# Patient Record
Sex: MALE | Race: BLACK OR AFRICAN AMERICAN | NOT HISPANIC OR LATINO | Employment: UNEMPLOYED | URBAN - METROPOLITAN AREA
[De-identification: names, ages, dates, MRNs, and addresses within clinical notes are randomized per-mention and may not be internally consistent; named-entity substitution may affect disease eponyms.]

---

## 2016-09-13 LAB — HBA1C MFR BLD HPLC: 5.7 %

## 2017-08-07 ENCOUNTER — ALLSCRIPTS OFFICE VISIT (OUTPATIENT)
Dept: OTHER | Facility: OTHER | Age: 42
End: 2017-08-07

## 2017-08-07 ENCOUNTER — GENERIC CONVERSION - ENCOUNTER (OUTPATIENT)
Dept: OTHER | Facility: OTHER | Age: 42
End: 2017-08-07

## 2017-11-25 ENCOUNTER — HOSPITAL ENCOUNTER (EMERGENCY)
Facility: HOSPITAL | Age: 42
Discharge: HOME | End: 2017-11-26
Attending: EMERGENCY MEDICINE | Admitting: EMERGENCY MEDICINE
Payer: COMMERCIAL

## 2017-11-25 DIAGNOSIS — R73.9 HYPERGLYCEMIA: ICD-10-CM

## 2017-11-25 DIAGNOSIS — E11.9 DIABETES (HCC): Primary | ICD-10-CM

## 2017-11-25 LAB — GLUCOSE SERPL-MCNC: 343 MG/DL (ref 65–140)

## 2017-11-25 PROCEDURE — 82948 REAGENT STRIP/BLOOD GLUCOSE: CPT

## 2017-11-25 PROCEDURE — 96360 HYDRATION IV INFUSION INIT: CPT

## 2017-11-25 RX ORDER — LISINOPRIL 40 MG/1
40 TABLET ORAL
COMMUNITY
End: 2018-12-11 | Stop reason: SDUPTHER

## 2017-11-25 RX ADMIN — METFORMIN HYDROCHLORIDE 500 MG: 500 TABLET, FILM COATED ORAL at 23:56

## 2017-11-25 RX ADMIN — SODIUM CHLORIDE 1000 ML: 0.9 INJECTION, SOLUTION INTRAVENOUS at 23:57

## 2017-11-26 VITALS
RESPIRATION RATE: 20 BRPM | DIASTOLIC BLOOD PRESSURE: 78 MMHG | WEIGHT: 262 LBS | BODY MASS INDEX: 36.68 KG/M2 | HEART RATE: 98 BPM | SYSTOLIC BLOOD PRESSURE: 140 MMHG | TEMPERATURE: 97.8 F | OXYGEN SATURATION: 98 % | HEIGHT: 71 IN

## 2017-11-26 LAB
ANION GAP SERPL CALCULATED.3IONS-SCNC: 16 MMOL/L (ref 4–13)
BASOPHILS # BLD AUTO: 0.05 THOUSANDS/ΜL (ref 0–0.1)
BASOPHILS NFR BLD AUTO: 1 % (ref 0–1)
BUN SERPL-MCNC: 21 MG/DL (ref 5–25)
CALCIUM SERPL-MCNC: 9.3 MG/DL (ref 8.3–10.1)
CHLORIDE SERPL-SCNC: 93 MMOL/L (ref 100–108)
CO2 SERPL-SCNC: 21 MMOL/L (ref 21–32)
CREAT SERPL-MCNC: 1.42 MG/DL (ref 0.6–1.3)
EOSINOPHIL # BLD AUTO: 0.22 THOUSAND/ΜL (ref 0–0.61)
EOSINOPHIL NFR BLD AUTO: 2 % (ref 0–6)
ERYTHROCYTE [DISTWIDTH] IN BLOOD BY AUTOMATED COUNT: 12 % (ref 11.6–15.1)
GFR SERPL CREATININE-BSD FRML MDRD: 71 ML/MIN/1.73SQ M
GLUCOSE SERPL-MCNC: 341 MG/DL (ref 65–140)
GLUCOSE SERPL-MCNC: 389 MG/DL (ref 65–140)
HCT VFR BLD AUTO: 44.5 % (ref 36.5–49.3)
HGB BLD-MCNC: 15.1 G/DL (ref 12–17)
LYMPHOCYTES # BLD AUTO: 3.21 THOUSANDS/ΜL (ref 0.6–4.47)
LYMPHOCYTES NFR BLD AUTO: 30 % (ref 14–44)
MCH RBC QN AUTO: 27.3 PG (ref 26.8–34.3)
MCHC RBC AUTO-ENTMCNC: 33.9 G/DL (ref 31.4–37.4)
MCV RBC AUTO: 81 FL (ref 82–98)
MONOCYTES # BLD AUTO: 0.66 THOUSAND/ΜL (ref 0.17–1.22)
MONOCYTES NFR BLD AUTO: 6 % (ref 4–12)
NEUTROPHILS # BLD AUTO: 6.58 THOUSANDS/ΜL (ref 1.85–7.62)
NEUTS SEG NFR BLD AUTO: 61 % (ref 43–75)
NRBC BLD AUTO-RTO: 0 /100 WBCS
PLATELET # BLD AUTO: 155 THOUSANDS/UL (ref 149–390)
PMV BLD AUTO: 12.4 FL (ref 8.9–12.7)
POTASSIUM SERPL-SCNC: 3.7 MMOL/L (ref 3.5–5.3)
RBC # BLD AUTO: 5.53 MILLION/UL (ref 3.88–5.62)
SODIUM SERPL-SCNC: 130 MMOL/L (ref 136–145)
WBC # BLD AUTO: 10.75 THOUSAND/UL (ref 4.31–10.16)

## 2017-11-26 PROCEDURE — 80048 BASIC METABOLIC PNL TOTAL CA: CPT | Performed by: EMERGENCY MEDICINE

## 2017-11-26 PROCEDURE — 99283 EMERGENCY DEPT VISIT LOW MDM: CPT

## 2017-11-26 PROCEDURE — 96361 HYDRATE IV INFUSION ADD-ON: CPT

## 2017-11-26 PROCEDURE — 82948 REAGENT STRIP/BLOOD GLUCOSE: CPT

## 2017-11-26 PROCEDURE — 36415 COLL VENOUS BLD VENIPUNCTURE: CPT | Performed by: EMERGENCY MEDICINE

## 2017-11-26 PROCEDURE — 85025 COMPLETE CBC W/AUTO DIFF WBC: CPT | Performed by: EMERGENCY MEDICINE

## 2017-11-26 RX ORDER — LANCETS 28 GAUGE
EACH MISCELLANEOUS
Qty: 100 EACH | Refills: 0 | Status: SHIPPED | OUTPATIENT
Start: 2017-11-26

## 2017-11-26 NOTE — DISCHARGE INSTRUCTIONS
Please return if he developed worsening or other concerning symptoms otherwise start this medication check her blood sugars as instructed follow up with family doctor for counseling and further instructions as instructed as well as adjustments of 2 medication         10% - bad control"> 10% - bad control,Hemoglobin A1c (HbA1c) greater than 10% indicating poor diabetic control,Haemoglobin A1c greater than 10% indicating poor diabetic control">   Diabetes Mellitus Type 2 in Adults, Ambulatory Care   GENERAL INFORMATION:   Diabetes mellitus type 2  is a disease that affects how your body uses glucose (sugar)  Insulin helps move sugar out of the blood so it can be used for energy  Normally, when the blood sugar level increases, the pancreas makes more insulin  Type 2 diabetes develops because either the body cannot make enough insulin, or it cannot use the insulin correctly  After many years, your pancreas may stop making insulin  Common symptoms include the following:   · More hunger or thirst than usual     · Frequent urination     · Weight loss without trying     · Blurred vision  Seek immediate care for the following symptoms:   · Severe abdominal pain, or pain that spreads to your back  You may also be vomiting  · Trouble staying awake or focusing    · Shaking or sweating    · Blurred or double vision    · Breath has a fruity, sweet smell    · Breathing is deep and labored, or rapid and shallow    · Heartbeat is fast and weak  Treatment for diabetes mellitus type 2  includes keeping your blood sugar at a normal level  You must eat the right foods, and exercise regularly  You may also need medicine if you cannot control your blood sugar level with nutrition and exercise  Manage diabetes mellitus type 2:   · Check your blood sugar level  You will be taught how to check a small drop of blood in a glucose monitor  Ask your healthcare provider when and how often to check during the day   Ask your healthcare provider what your blood sugar levels should be when you check them  · Keep track of carbohydrates (sugar and starchy foods)  Your blood sugar level can get too high if you eat too many carbohydrates  Your dietitian will help you plan meals and snacks that have the right amount of carbohydrates  · Eat low-fat foods  Some examples are skinless chicken and low-fat milk  · Eat less sodium (salt)  Some examples of high-sodium foods to limit are soy sauce, potato chips, and soup  Do not add salt to food you cook  Limit your use of table salt  · Eat high-fiber foods  Foods that are a good source of fiber include vegetables, whole grain bread, and beans  · Limit alcohol  Alcohol affects your blood sugar level and can make it harder to manage your diabetes  Women should limit alcohol to 1 drink a day  Men should limit alcohol to 2 drinks a day  A drink of alcohol is 12 ounces of beer, 5 ounces of wine, or 1½ ounces of liquor  · Get regular exercise  Exercise can help keep your blood sugar level steady, decrease your risk of heart disease, and help you lose weight  Exercise for at least 30 minutes, 5 days a week  Include muscle strengthening activities 2 days each week  Work with your healthcare provider to create an exercise plan  · Check your feet each day  for injuries or open sores  Ask your healthcare provider for activities you can do if you have an open sore  · Quit smoking  If you smoke, it is never too late to quit  Smoking can worsen the problems that may occur with diabetes  Ask your healthcare provider for information about how to stop smoking if you are having trouble quitting  · Ask about your weight:  Ask healthcare providers if you need to lose weight, and how much to lose  Ask them to help you with a weight loss program  Even a 10 to 15 pound weight loss can help you manage your blood sugar level  · Carry medical alert identification    Wear medical alert jewelry or carry a card that says you have diabetes  Ask your healthcare provider where to get these items  · Ask about vaccines  Diabetes puts you at risk of serious illness if you get the flu, pneumonia, or hepatitis  Ask your healthcare provider if you should get a flu, pneumonia, or hepatitis B vaccine, and when to get the vaccine  Follow up with your healthcare provider as directed:  Write down your questions so you remember to ask them during your visits  CARE AGREEMENT:   You have the right to help plan your care  Learn about your health condition and how it may be treated  Discuss treatment options with your caregivers to decide what care you want to receive  You always have the right to refuse treatment  The above information is an  only  It is not intended as medical advice for individual conditions or treatments  Talk to your doctor, nurse or pharmacist before following any medical regimen to see if it is safe and effective for you  © 2014 [address] is for End User's use only and may not be sold, redistributed or otherwise used for commercial purposes  All illustrations and images included in CareNotes® are the copyrighted property of A D A M [employer]  or Seth Jose

## 2017-11-26 NOTE — ED PROVIDER NOTES
History  Chief Complaint   Patient presents with    Hyperglycemia - no symptoms     Pt newly diagnosed with diabetes and concerned because he had a 5 blood glucose at [address]  Pt was discharged from [facility] this morning for DKA  Pt not currently having any symptoms     46-year-old male with history of hypertension, previous cardiac surgery with benign tumor presenting with chief complaint of elevated blood sugar  Patient states her months he has had symptoms of elevated blood sugar comma he lives in [place] all of his doctors are in your comma he had a large surgery on his heart in 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, he was not diabetic at that time, he ultimately went to [facility] a day ago was diagnosed with elevated blood sugar with sugars in the 900 its comma he was admitted on insulin drip and frustrated because nobody was talking to him or updating him after 24 hours knee left  He was not given medications to go home with, him and his wife went out body glue common her over-the-counter took his blood sugar prior to arrival was 340 the presented out of concern for elevated blood sugar  Patient states he symptoms been going on for months there are no acute changes, he notes that he has thrush in his mouth they gave him swish and spit medicine for this but otherwise denies fevers recent viral symptoms headache chest pain cough or shortness of breath nausea vomiting anorexia abdominal pain change in bowels or bladder leg swelling calf pain or tenderness or other associated symptoms  Complete review systems otherwise negative as noted            Prior to Admission Medications   Prescriptions Last Dose Informant Patient Reported?  Taking?   hydrochlorothiazide (HYDRODIURIL) 5 mg/mL SUSP oral suspension   Yes No   Sig: Take 25 mg by mouth   lisinopril (ZESTRIL) 40 mg tablet   Yes No   Sig: Take 40 mg by mouth      Facility-Administered Medications: None       Past Medical History:   Diagnosis Date    Diabetes mellitus ([facility] [address] )     Hypertension        Past Surgical History:   Procedure Laterality Date    CARDIAC SURGERY      ROTATOR CUFF REPAIR         History reviewed  No pertinent family history  I have reviewed and agree with the history as documented  Social History   Substance Use Topics    Smoking status: Former Smoker    Smokeless tobacco: Never Used    Alcohol use No        Review of Systems   Constitutional: Negative for chills and fever  HENT: Negative for rhinorrhea, sore throat, trouble swallowing and voice change  Eyes: Negative for photophobia and pain  Respiratory: Negative for cough and shortness of breath  Cardiovascular: Negative for chest pain and palpitations  Gastrointestinal: Negative for abdominal pain, diarrhea, nausea and vomiting  Endocrine: Positive for polydipsia  Negative for polyphagia and polyuria  Genitourinary: Negative for dysuria, frequency and urgency  Musculoskeletal: Negative for arthralgias, back pain and myalgias  Skin: Negative for color change and rash  Neurological: Negative for dizziness and weakness  Hematological: Negative for adenopathy  Does not bruise/bleed easily  Psychiatric/Behavioral: Negative for agitation and behavioral problems  All other systems reviewed and are negative  Physical Exam  ED Triage Vitals [11/25/17 2311]   Temperature Pulse Respirations Blood Pressure SpO2   97 8 °F (36 6 °C) 104 20 141/88 97 %      Temp Source Heart Rate Source Patient Position - Orthostatic VS BP Location FiO2 (%)   Temporal Monitor Sitting Right arm --      Pain Score       No Pain           Orthostatic Vital Signs  Vitals:    11/25/17 2311 11/26/17 0157   BP: 141/88 140/78   Pulse: 104 98   Patient Position - Orthostatic VS: Sitting Sitting       Physical Exam   Constitutional: He is oriented to person, place, and time  He appears well-developed and well-nourished  No distress  Well-appearing conversational no acute distress   HENT:   Head: Normocephalic and atraumatic  Eyes: EOM are normal  Pupils are equal, round, and reactive to light  Neck: Normal range of motion  Neck supple  No tracheal deviation present  Cardiovascular: Normal rate, regular rhythm and normal heart sounds  Exam reveals no gallop and no friction rub  No murmur heard  Pulmonary/Chest: Effort normal and breath sounds normal  He has no wheezes  He has no rales  Abdominal: Soft  Bowel sounds are normal  He exhibits no distension  There is no tenderness  There is no rebound and no guarding  Musculoskeletal: Normal range of motion  He exhibits no edema or tenderness  Neurological: He is alert and oriented to person, place, and time  No cranial nerve deficit  He exhibits normal muscle tone  Coordination normal    Skin: Skin is warm and dry  No rash noted  Psychiatric: He has a normal mood and affect  His behavior is normal    Nursing note and vitals reviewed        ED Medications  Medications   sodium chloride 0 9 % bolus 1,000 mL (0 mL Intravenous Stopped 11/26/17 0157)   metFORMIN (GLUCOPHAGE) tablet 500 mg (500 mg Oral Given 11/25/17 6966)       Diagnostic Studies  Results Reviewed     Procedure Component Value Units Date/Time    Fingerstick Glucose (POCT) [[id number]]  (Abnormal) Collected:  11/26/17 0101    Lab Status:  Final result Updated:  11/26/17 0102     POC Glucose 341 (H) mg/dl     Basic metabolic panel [[id number]]  (Abnormal) Collected:  11/26/17 0023    Lab Status:  Final result Specimen:  Blood from Arm, Left Updated:  11/26/17 0038     Sodium 130 (L) mmol/L      Potassium 3 7 mmol/L      Chloride 93 (L) mmol/L      CO2 21 mmol/L      Anion Gap 16 (H) mmol/L      BUN 21 mg/dL      Creatinine 1 42 (H) mg/dL      Glucose 389 (H) mg/dL      Calcium 9 3 mg/dL      eGFR 71 ml/min/1 73sq m     Narrative:         National Kidney Disease Education Program recommendations are as follows:  GFR calculation is accurate only with a steady state creatinine  Chronic Kidney disease less than 60 ml/min/1 73 sq  meters  Kidney failure less than 15 ml/min/1 73 sq  meters  CBC and differential [[id number]]  (Abnormal) Collected:  11/26/17 0024    Lab Status:  Final result Specimen:  Blood from Arm, Left Updated:  11/26/17 0028     WBC 10 75 (H) Thousand/uL      RBC 5 53 Million/uL      Hemoglobin 15 1 g/dL      Hematocrit 44 5 %      MCV 81 (L) fL      MCH 27 3 pg      MCHC 33 9 g/dL      RDW 12 0 %      MPV 12 4 fL      Platelets 696 Thousands/uL      nRBC 0 /100 WBCs      Neutrophils Relative 61 %      Lymphocytes Relative 30 %      Monocytes Relative 6 %      Eosinophils Relative 2 %      Basophils Relative 1 %      Neutrophils Absolute 6 58 Thousands/µL      Lymphocytes Absolute 3 21 Thousands/µL      Monocytes Absolute 0 66 Thousand/µL      Eosinophils Absolute 0 22 Thousand/µL      Basophils Absolute 0 05 Thousands/µL     Fingerstick Glucose (POCT) [[id number]]  (Abnormal) Collected:  11/25/17 2304    Lab Status:  Final result Updated:  11/25/17 2305     POC Glucose 343 (H) mg/dl                  No orders to display              Procedures  Procedures       Phone Contacts  ED Phone Contact    ED Course  ED Course as of Nov 27 1041   Sun Nov 26, 2017   0143 Patient seen and re-evaluated long time hyperglycemia, creatinine mildly elevated however unknown baseline does not constitute acute kidney injury, given fluid bolus patient has follow-up with family doctor this week comma will start metformin he has glucometer, will discharge with metformin, blood sugar checks close return instructions early family doctor counseled extensively on diabetes and eating and follow-up patient family agreeable plan                                MDM  Number of Diagnoses or Management Options  Diabetes ([facility] [address] ):    Hyperglycemia:   Diagnosis management comments: 61-year-old male with a history of hypertension, previous cardiac surgery presenting with elevated blood sugar, recently went to different facility with symptoms of hyperglycemia that had been ongoing, found to have elevated showed blood sugar shawn admitted to the hospital on insulin drip and discharge versus left against medical advice because he was frustrated that people were not updating him comma he was not discharged on medications, bought glucometer with his wife noted that his blood sugar was in the 300's this morning, has mild dry mouth but no other complaints on exam she is afebrile normal vital signs is clinically very well appearing, the wife looked up DKA on the Internet although he does not believe he had DKA, this is likely long-standing hyperglycemia, nose strings significant amount of soda,  though with this reported will check basic labs, fluid bolus, if unrevealing will discharge with metformin, glucometer/ test strips, counseling on diabetes, eating, keeping track of his sugars comma patient does have reliable early follow-up with establish primary care close return instructions    CritCare Time    Disposition  Final diagnoses:   Diabetes ([facility] )   Hyperglycemia     Time reflects when diagnosis was documented in both MDM as applicable and the Disposition within this note     Time User Action Codes Description Comment    11/26/2017  1:44 AM Karen Lanza Add [E11 9] Diabetes ([facility] [address] )     11/26/2017  1:44 AM Shanon Godfrey Add [R73 9] Hyperglycemia       ED Disposition     ED Disposition Condition Comment    Discharge  Jonelle Hall discharge to home/self care      Condition at discharge: Good        Follow-up Information     Follow up With Specialties Details Why Contact Info Additional Information    [facility]  If symptoms worsen [address] [address] ED, [address] [place] [address], [phone]        Discharge Medication List as of 11/26/2017  1:48 AM      START taking these medications    Details   Lancets (FREESTYLE) lancets Use as instructed, Print      metFORMIN (GLUCOPHAGE) 500 mg tablet Take 1 tablet by mouth 2 (two) times a day with meals for 30 days, Starting Sun 11/26/2017, Until Tue 12/26/2017, Print         CONTINUE these medications which have NOT CHANGED    Details   hydrochlorothiazide (HYDRODIURIL) 5 mg/mL SUSP oral suspension Take 25 mg by mouth, Historical Med      lisinopril (ZESTRIL) 40 mg tablet Take 40 mg by mouth, Historical Med             Outpatient Discharge Orders  Glucometer test strips     Glucometer         ED Provider  Electronically Signed by           Sabine Elaine DO  11/27/17 2827

## 2017-11-29 LAB — HBA1C MFR BLD HPLC: 10.1 %

## 2017-12-01 ENCOUNTER — APPOINTMENT (EMERGENCY)
Dept: RADIOLOGY | Facility: HOSPITAL | Age: 42
DRG: 638 | End: 2017-12-01
Payer: COMMERCIAL

## 2017-12-01 ENCOUNTER — HOSPITAL ENCOUNTER (INPATIENT)
Facility: HOSPITAL | Age: 42
LOS: 2 days | Discharge: HOME | DRG: 638 | End: 2017-12-03
Attending: EMERGENCY MEDICINE | Admitting: STUDENT IN AN ORGANIZED HEALTH CARE EDUCATION/TRAINING PROGRAM
Payer: COMMERCIAL

## 2017-12-01 DIAGNOSIS — R73.9 HYPERGLYCEMIA: ICD-10-CM

## 2017-12-01 DIAGNOSIS — E87.1 HYPONATREMIA: Primary | ICD-10-CM

## 2017-12-01 DIAGNOSIS — E11.10 DIABETIC KETOACIDOSIS (HCC): ICD-10-CM

## 2017-12-01 DIAGNOSIS — N17.9 AKI (ACUTE KIDNEY INJURY) (HCC): ICD-10-CM

## 2017-12-01 LAB
ACETONE SERPL-MCNC: ABNORMAL MG/DL
ALBUMIN SERPL BCP-MCNC: 4.1 G/DL (ref 3.5–5)
ALP SERPL-CCNC: 73 U/L (ref 46–116)
ALT SERPL W P-5'-P-CCNC: 55 U/L (ref 12–78)
ANION GAP SERPL CALCULATED.3IONS-SCNC: 10 MMOL/L (ref 4–13)
ANION GAP SERPL CALCULATED.3IONS-SCNC: 12 MMOL/L (ref 4–13)
ANION GAP SERPL CALCULATED.3IONS-SCNC: 15 MMOL/L (ref 4–13)
ANION GAP SERPL CALCULATED.3IONS-SCNC: 25 MMOL/L (ref 4–13)
ARTERIAL PATENCY WRIST A: YES
AST SERPL W P-5'-P-CCNC: 22 U/L (ref 5–45)
ATRIAL RATE: 114 BPM
BACTERIA UR QL AUTO: ABNORMAL /HPF
BASE EX.OXY STD BLDV CALC-SCNC: 55.2 % (ref 60–80)
BASE EX.OXY STD BLDV CALC-SCNC: 85.8 % (ref 60–80)
BASE EX.OXY STD BLDV CALC-SCNC: 92.1 % (ref 60–80)
BASE EX.OXY STD BLDV CALC-SCNC: 94.5 % (ref 60–80)
BASE EXCESS BLDV CALC-SCNC: -10.5 MMOL/L
BASE EXCESS BLDV CALC-SCNC: -17.8 MMOL/L
BASE EXCESS BLDV CALC-SCNC: -3.3 MMOL/L
BASE EXCESS BLDV CALC-SCNC: -5.4 MMOL/L
BASOPHILS # BLD AUTO: 0.06 THOUSANDS/ΜL (ref 0–0.1)
BASOPHILS NFR BLD AUTO: 0 % (ref 0–1)
BILIRUB DIRECT SERPL-MCNC: 0.22 MG/DL (ref 0–0.2)
BILIRUB SERPL-MCNC: 0.8 MG/DL (ref 0.2–1)
BILIRUB UR QL STRIP: ABNORMAL
BUN SERPL-MCNC: 31 MG/DL (ref 5–25)
BUN SERPL-MCNC: 32 MG/DL (ref 5–25)
BUN SERPL-MCNC: 35 MG/DL (ref 5–25)
BUN SERPL-MCNC: 41 MG/DL (ref 5–25)
CA-I BLD-SCNC: 0.86 MMOL/L (ref 1.12–1.32)
CA-I BLD-SCNC: 1.12 MMOL/L (ref 1.12–1.32)
CA-I BLD-SCNC: 1.13 MMOL/L (ref 1.12–1.32)
CALCIUM SERPL-MCNC: 10.4 MG/DL (ref 8.3–10.1)
CALCIUM SERPL-MCNC: 8.3 MG/DL (ref 8.3–10.1)
CALCIUM SERPL-MCNC: 8.6 MG/DL (ref 8.3–10.1)
CALCIUM SERPL-MCNC: 9.2 MG/DL (ref 8.3–10.1)
CHLORIDE SERPL-SCNC: 101 MMOL/L (ref 100–108)
CHLORIDE SERPL-SCNC: 86 MMOL/L (ref 100–108)
CHLORIDE SERPL-SCNC: 98 MMOL/L (ref 100–108)
CHLORIDE SERPL-SCNC: 98 MMOL/L (ref 100–108)
CLARITY UR: CLEAR
CO2 SERPL-SCNC: 14 MMOL/L (ref 21–32)
CO2 SERPL-SCNC: 22 MMOL/L (ref 21–32)
CO2 SERPL-SCNC: 23 MMOL/L (ref 21–32)
CO2 SERPL-SCNC: 24 MMOL/L (ref 21–32)
COARSE GRAN CASTS URNS QL MICRO: ABNORMAL /LPF
COLOR UR: YELLOW
CREAT SERPL-MCNC: 1.82 MG/DL (ref 0.6–1.3)
CREAT SERPL-MCNC: 1.95 MG/DL (ref 0.6–1.3)
CREAT SERPL-MCNC: 2.03 MG/DL (ref 0.6–1.3)
CREAT SERPL-MCNC: 2.14 MG/DL (ref 0.6–1.3)
EOSINOPHIL # BLD AUTO: 0.06 THOUSAND/ΜL (ref 0–0.61)
EOSINOPHIL NFR BLD AUTO: 0 % (ref 0–6)
ERYTHROCYTE [DISTWIDTH] IN BLOOD BY AUTOMATED COUNT: 11.9 % (ref 11.6–15.1)
GFR SERPL CREATININE-BSD FRML MDRD: 43 ML/MIN/1.73SQ M
GFR SERPL CREATININE-BSD FRML MDRD: 46 ML/MIN/1.73SQ M
GFR SERPL CREATININE-BSD FRML MDRD: 48 ML/MIN/1.73SQ M
GFR SERPL CREATININE-BSD FRML MDRD: 53 ML/MIN/1.73SQ M
GLUCOSE SERPL-MCNC: 114 MG/DL (ref 65–140)
GLUCOSE SERPL-MCNC: 117 MG/DL (ref 65–140)
GLUCOSE SERPL-MCNC: 138 MG/DL (ref 65–140)
GLUCOSE SERPL-MCNC: 178 MG/DL (ref 65–140)
GLUCOSE SERPL-MCNC: 186 MG/DL (ref 65–140)
GLUCOSE SERPL-MCNC: 189 MG/DL (ref 65–140)
GLUCOSE SERPL-MCNC: 191 MG/DL (ref 65–140)
GLUCOSE SERPL-MCNC: 213 MG/DL (ref 65–140)
GLUCOSE SERPL-MCNC: 231 MG/DL (ref 65–140)
GLUCOSE SERPL-MCNC: 234 MG/DL (ref 65–140)
GLUCOSE SERPL-MCNC: 235 MG/DL (ref 65–140)
GLUCOSE SERPL-MCNC: 240 MG/DL (ref 65–140)
GLUCOSE SERPL-MCNC: 256 MG/DL (ref 65–140)
GLUCOSE SERPL-MCNC: 267 MG/DL (ref 65–140)
GLUCOSE SERPL-MCNC: 307 MG/DL (ref 65–140)
GLUCOSE SERPL-MCNC: 355 MG/DL (ref 65–140)
GLUCOSE SERPL-MCNC: 393 MG/DL (ref 65–140)
GLUCOSE SERPL-MCNC: 495 MG/DL (ref 65–140)
GLUCOSE SERPL-MCNC: 80 MG/DL (ref 65–140)
GLUCOSE SERPL-MCNC: 94 MG/DL (ref 65–140)
GLUCOSE UR STRIP-MCNC: ABNORMAL MG/DL
HCO3 BLDV-SCNC: 12.9 MMOL/L (ref 24–30)
HCO3 BLDV-SCNC: 21.1 MMOL/L (ref 24–30)
HCO3 BLDV-SCNC: 22.5 MMOL/L (ref 24–30)
HCO3 BLDV-SCNC: 7.1 MMOL/L (ref 24–30)
HCT VFR BLD AUTO: 44.9 % (ref 36.5–49.3)
HGB BLD-MCNC: 15.3 G/DL (ref 12–17)
HGB UR QL STRIP.AUTO: ABNORMAL
HYALINE CASTS #/AREA URNS LPF: ABNORMAL /LPF
KETONES UR STRIP-MCNC: ABNORMAL MG/DL
LEUKOCYTE ESTERASE UR QL STRIP: NEGATIVE
LIPASE SERPL-CCNC: 184 U/L (ref 73–393)
LYMPHOCYTES # BLD AUTO: 2.72 THOUSANDS/ΜL (ref 0.6–4.47)
LYMPHOCYTES NFR BLD AUTO: 20 % (ref 14–44)
MAGNESIUM SERPL-MCNC: 1.8 MG/DL (ref 1.6–2.6)
MAGNESIUM SERPL-MCNC: 2.2 MG/DL (ref 1.6–2.6)
MAGNESIUM SERPL-MCNC: 2.6 MG/DL (ref 1.6–2.6)
MCH RBC QN AUTO: 26.9 PG (ref 26.8–34.3)
MCHC RBC AUTO-ENTMCNC: 34.1 G/DL (ref 31.4–37.4)
MCV RBC AUTO: 79 FL (ref 82–98)
MONOCYTES # BLD AUTO: 0.76 THOUSAND/ΜL (ref 0.17–1.22)
MONOCYTES NFR BLD AUTO: 6 % (ref 4–12)
MUCOUS THREADS UR QL AUTO: ABNORMAL
NEUTROPHILS # BLD AUTO: 10.17 THOUSANDS/ΜL (ref 1.85–7.62)
NEUTS SEG NFR BLD AUTO: 74 % (ref 43–75)
NITRITE UR QL STRIP: NEGATIVE
NON-SQ EPI CELLS URNS QL MICRO: ABNORMAL /HPF
NRBC BLD AUTO-RTO: 0 /100 WBCS
O2 CT BLDV-SCNC: 10.6 ML/DL
O2 CT BLDV-SCNC: 17.1 ML/DL
O2 CT BLDV-SCNC: 21 ML/DL
O2 CT BLDV-SCNC: 9.6 ML/DL
P AXIS: 64 DEGREES
PCO2 BLDV: 15.2 MM HG (ref 42–50)
PCO2 BLDV: 24.2 MM HG (ref 42–50)
PCO2 BLDV: 43 MM HG (ref 42–50)
PCO2 BLDV: 45.3 MM HG (ref 42–50)
PH BLDV: 7.29 [PH] (ref 7.3–7.4)
PH BLDV: 7.29 [PH] (ref 7.3–7.4)
PH BLDV: 7.34 [PH] (ref 7.3–7.4)
PH BLDV: 7.35 [PH] (ref 7.3–7.4)
PH UR STRIP.AUTO: 5 [PH] (ref 4.5–8)
PLATELET # BLD AUTO: 202 THOUSANDS/UL (ref 149–390)
PMV BLD AUTO: 13.5 FL (ref 8.9–12.7)
PO2 BLDV: 129.1 MM HG (ref 35–45)
PO2 BLDV: 30.7 MM HG (ref 35–45)
PO2 BLDV: 56.8 MM HG (ref 35–45)
PO2 BLDV: 87.5 MM HG (ref 35–45)
POTASSIUM SERPL-SCNC: 3.4 MMOL/L (ref 3.5–5.3)
POTASSIUM SERPL-SCNC: 3.5 MMOL/L (ref 3.5–5.3)
POTASSIUM SERPL-SCNC: 4 MMOL/L (ref 3.5–5.3)
POTASSIUM SERPL-SCNC: 4.6 MMOL/L (ref 3.5–5.3)
PR INTERVAL: 128 MS
PROT SERPL-MCNC: 8.5 G/DL (ref 6.4–8.2)
PROT UR STRIP-MCNC: NEGATIVE MG/DL
QRS AXIS: 72 DEGREES
QRSD INTERVAL: 82 MS
QT INTERVAL: 324 MS
QTC INTERVAL: 446 MS
RBC # BLD AUTO: 5.68 MILLION/UL (ref 3.88–5.62)
RBC #/AREA URNS AUTO: ABNORMAL /HPF
SODIUM SERPL-SCNC: 125 MMOL/L (ref 136–145)
SODIUM SERPL-SCNC: 133 MMOL/L (ref 136–145)
SODIUM SERPL-SCNC: 135 MMOL/L (ref 136–145)
SODIUM SERPL-SCNC: 135 MMOL/L (ref 136–145)
SP GR UR STRIP.AUTO: >=1.03 (ref 1–1.03)
T WAVE AXIS: 32 DEGREES
TROPONIN I SERPL-MCNC: <0.02 NG/ML
TROPONIN I SERPL-MCNC: <0.02 NG/ML
UROBILINOGEN UR QL STRIP.AUTO: 0.2 E.U./DL
VENTRICULAR RATE: 114 BPM
WBC # BLD AUTO: 13.82 THOUSAND/UL (ref 4.31–10.16)
WBC #/AREA URNS AUTO: ABNORMAL /HPF

## 2017-12-01 PROCEDURE — 85025 COMPLETE CBC W/AUTO DIFF WBC: CPT | Performed by: EMERGENCY MEDICINE

## 2017-12-01 PROCEDURE — 80076 HEPATIC FUNCTION PANEL: CPT | Performed by: EMERGENCY MEDICINE

## 2017-12-01 PROCEDURE — 82570 ASSAY OF URINE CREATININE: CPT | Performed by: NURSE PRACTITIONER

## 2017-12-01 PROCEDURE — 84133 ASSAY OF URINE POTASSIUM: CPT | Performed by: NURSE PRACTITIONER

## 2017-12-01 PROCEDURE — 84484 ASSAY OF TROPONIN QUANT: CPT | Performed by: EMERGENCY MEDICINE

## 2017-12-01 PROCEDURE — 80048 BASIC METABOLIC PNL TOTAL CA: CPT | Performed by: NURSE PRACTITIONER

## 2017-12-01 PROCEDURE — 82009 KETONE BODYS QUAL: CPT | Performed by: EMERGENCY MEDICINE

## 2017-12-01 PROCEDURE — 71020 HB CHEST X-RAY 2VW FRONTAL&LATL: CPT

## 2017-12-01 PROCEDURE — 82805 BLOOD GASES W/O2 SATURATION: CPT | Performed by: EMERGENCY MEDICINE

## 2017-12-01 PROCEDURE — 83935 ASSAY OF URINE OSMOLALITY: CPT | Performed by: NURSE PRACTITIONER

## 2017-12-01 PROCEDURE — 93005 ELECTROCARDIOGRAM TRACING: CPT | Performed by: EMERGENCY MEDICINE

## 2017-12-01 PROCEDURE — 83690 ASSAY OF LIPASE: CPT | Performed by: EMERGENCY MEDICINE

## 2017-12-01 PROCEDURE — 80048 BASIC METABOLIC PNL TOTAL CA: CPT | Performed by: EMERGENCY MEDICINE

## 2017-12-01 PROCEDURE — 99285 EMERGENCY DEPT VISIT HI MDM: CPT

## 2017-12-01 PROCEDURE — 83735 ASSAY OF MAGNESIUM: CPT | Performed by: NURSE PRACTITIONER

## 2017-12-01 PROCEDURE — 36415 COLL VENOUS BLD VENIPUNCTURE: CPT | Performed by: EMERGENCY MEDICINE

## 2017-12-01 PROCEDURE — 84484 ASSAY OF TROPONIN QUANT: CPT | Performed by: NURSE PRACTITIONER

## 2017-12-01 PROCEDURE — 82948 REAGENT STRIP/BLOOD GLUCOSE: CPT

## 2017-12-01 PROCEDURE — 82805 BLOOD GASES W/O2 SATURATION: CPT | Performed by: NURSE PRACTITIONER

## 2017-12-01 PROCEDURE — 82043 UR ALBUMIN QUANTITATIVE: CPT | Performed by: NURSE PRACTITIONER

## 2017-12-01 PROCEDURE — 96374 THER/PROPH/DIAG INJ IV PUSH: CPT

## 2017-12-01 PROCEDURE — C9113 INJ PANTOPRAZOLE SODIUM, VIA: HCPCS | Performed by: NURSE PRACTITIONER

## 2017-12-01 PROCEDURE — 81001 URINALYSIS AUTO W/SCOPE: CPT | Performed by: EMERGENCY MEDICINE

## 2017-12-01 PROCEDURE — 84540 ASSAY OF URINE/UREA-N: CPT | Performed by: NURSE PRACTITIONER

## 2017-12-01 PROCEDURE — 96361 HYDRATE IV INFUSION ADD-ON: CPT

## 2017-12-01 PROCEDURE — 82330 ASSAY OF CALCIUM: CPT | Performed by: NURSE PRACTITIONER

## 2017-12-01 PROCEDURE — 82436 ASSAY OF URINE CHLORIDE: CPT | Performed by: NURSE PRACTITIONER

## 2017-12-01 RX ORDER — HEPARIN SODIUM 5000 [USP'U]/ML
5000 INJECTION, SOLUTION INTRAVENOUS; SUBCUTANEOUS EVERY 8 HOURS SCHEDULED
Status: DISCONTINUED | OUTPATIENT
Start: 2017-12-01 | End: 2017-12-03 | Stop reason: HOSPADM

## 2017-12-01 RX ORDER — POTASSIUM CHLORIDE, DEXTROSE MONOHYDRATE AND SODIUM CHLORIDE 300; 5; 900 MG/100ML; G/100ML; MG/100ML
125 INJECTION, SOLUTION INTRAVENOUS CONTINUOUS
Status: DISCONTINUED | OUTPATIENT
Start: 2017-12-01 | End: 2017-12-01

## 2017-12-01 RX ORDER — SODIUM CHLORIDE 9 MG/ML
1000 INJECTION, SOLUTION INTRAVENOUS CONTINUOUS
Status: DISPENSED | OUTPATIENT
Start: 2017-12-01 | End: 2017-12-01

## 2017-12-01 RX ORDER — SODIUM CHLORIDE 9 MG/ML
500 INJECTION, SOLUTION INTRAVENOUS CONTINUOUS
Status: DISPENSED | OUTPATIENT
Start: 2017-12-01 | End: 2017-12-01

## 2017-12-01 RX ORDER — ONDANSETRON 2 MG/ML
4 INJECTION INTRAMUSCULAR; INTRAVENOUS ONCE
Status: COMPLETED | OUTPATIENT
Start: 2017-12-01 | End: 2017-12-01

## 2017-12-01 RX ORDER — SODIUM CHLORIDE 9 MG/ML
250 INJECTION, SOLUTION INTRAVENOUS CONTINUOUS
Status: DISPENSED | OUTPATIENT
Start: 2017-12-01 | End: 2017-12-01

## 2017-12-01 RX ORDER — POTASSIUM CHLORIDE 20 MEQ/1
40 TABLET, EXTENDED RELEASE ORAL ONCE
Status: COMPLETED | OUTPATIENT
Start: 2017-12-01 | End: 2017-12-01

## 2017-12-01 RX ORDER — SODIUM CHLORIDE 9 MG/ML
2000 INJECTION, SOLUTION INTRAVENOUS CONTINUOUS
Status: DISPENSED | OUTPATIENT
Start: 2017-12-01 | End: 2017-12-01

## 2017-12-01 RX ORDER — POTASSIUM CHLORIDE 29.8 MG/ML
40 INJECTION INTRAVENOUS ONCE
Status: DISCONTINUED | OUTPATIENT
Start: 2017-12-01 | End: 2017-12-01

## 2017-12-01 RX ORDER — MAGNESIUM HYDROXIDE/ALUMINUM HYDROXICE/SIMETHICONE 120; 1200; 1200 MG/30ML; MG/30ML; MG/30ML
30 SUSPENSION ORAL EVERY 4 HOURS PRN
Status: DISCONTINUED | OUTPATIENT
Start: 2017-12-01 | End: 2017-12-03 | Stop reason: HOSPADM

## 2017-12-01 RX ORDER — POTASSIUM CHLORIDE 29.8 MG/ML
40 INJECTION INTRAVENOUS ONCE
Status: COMPLETED | OUTPATIENT
Start: 2017-12-01 | End: 2017-12-01

## 2017-12-01 RX ORDER — MAGNESIUM SULFATE HEPTAHYDRATE 40 MG/ML
2 INJECTION, SOLUTION INTRAVENOUS ONCE
Status: COMPLETED | OUTPATIENT
Start: 2017-12-01 | End: 2017-12-01

## 2017-12-01 RX ORDER — PANTOPRAZOLE SODIUM 40 MG/1
40 INJECTION, POWDER, FOR SOLUTION INTRAVENOUS
Status: DISCONTINUED | OUTPATIENT
Start: 2017-12-01 | End: 2017-12-03 | Stop reason: HOSPADM

## 2017-12-01 RX ORDER — ONDANSETRON 2 MG/ML
4 INJECTION INTRAMUSCULAR; INTRAVENOUS EVERY 4 HOURS PRN
Status: DISCONTINUED | OUTPATIENT
Start: 2017-12-01 | End: 2017-12-03 | Stop reason: HOSPADM

## 2017-12-01 RX ORDER — INSULIN GLARGINE 100 [IU]/ML
35 INJECTION, SOLUTION SUBCUTANEOUS
Status: DISCONTINUED | OUTPATIENT
Start: 2017-12-01 | End: 2017-12-01

## 2017-12-01 RX ORDER — MAGNESIUM HYDROXIDE/ALUMINUM HYDROXICE/SIMETHICONE 120; 1200; 1200 MG/30ML; MG/30ML; MG/30ML
30 SUSPENSION ORAL ONCE
Status: COMPLETED | OUTPATIENT
Start: 2017-12-01 | End: 2017-12-01

## 2017-12-01 RX ORDER — DEXTROSE AND SODIUM CHLORIDE 5; .9 G/100ML; G/100ML
250 INJECTION, SOLUTION INTRAVENOUS CONTINUOUS
Status: DISCONTINUED | OUTPATIENT
Start: 2017-12-01 | End: 2017-12-01

## 2017-12-01 RX ORDER — POTASSIUM CHLORIDE 20 MEQ/1
40 TABLET, EXTENDED RELEASE ORAL
Status: COMPLETED | OUTPATIENT
Start: 2017-12-01 | End: 2017-12-01

## 2017-12-01 RX ORDER — CHLORHEXIDINE GLUCONATE 0.12 MG/ML
15 RINSE ORAL EVERY 12 HOURS SCHEDULED
Status: DISCONTINUED | OUTPATIENT
Start: 2017-12-01 | End: 2017-12-03 | Stop reason: HOSPADM

## 2017-12-01 RX ORDER — INSULIN GLARGINE 100 [IU]/ML
45 INJECTION, SOLUTION SUBCUTANEOUS
Status: DISCONTINUED | OUTPATIENT
Start: 2017-12-01 | End: 2017-12-03 | Stop reason: HOSPADM

## 2017-12-01 RX ADMIN — DEXTROSE AND SODIUM CHLORIDE 250 ML/HR: 5; .9 INJECTION, SOLUTION INTRAVENOUS at 11:43

## 2017-12-01 RX ADMIN — SODIUM CHLORIDE 500 ML/HR: 0.9 INJECTION, SOLUTION INTRAVENOUS at 07:50

## 2017-12-01 RX ADMIN — POTASSIUM CHLORIDE: 2 INJECTION, SOLUTION, CONCENTRATE INTRAVENOUS at 15:56

## 2017-12-01 RX ADMIN — POTASSIUM CHLORIDE 40 MEQ: 1500 TABLET, EXTENDED RELEASE ORAL at 13:34

## 2017-12-01 RX ADMIN — PANTOPRAZOLE SODIUM 40 MG: 40 INJECTION, POWDER, FOR SOLUTION INTRAVENOUS at 12:55

## 2017-12-01 RX ADMIN — ONDANSETRON 4 MG: 2 INJECTION INTRAMUSCULAR; INTRAVENOUS at 05:08

## 2017-12-01 RX ADMIN — HEPARIN SODIUM 5000 UNITS: 5000 INJECTION, SOLUTION INTRAVENOUS; SUBCUTANEOUS at 10:49

## 2017-12-01 RX ADMIN — SODIUM CHLORIDE 1000 ML: 0.9 INJECTION, SOLUTION INTRAVENOUS at 05:53

## 2017-12-01 RX ADMIN — SODIUM CHLORIDE 11 UNITS/HR: 9 INJECTION, SOLUTION INTRAVENOUS at 07:46

## 2017-12-01 RX ADMIN — POTASSIUM CHLORIDE 40 MEQ: 1500 TABLET, EXTENDED RELEASE ORAL at 11:50

## 2017-12-01 RX ADMIN — SODIUM CHLORIDE 10 UNITS/HR: 9 INJECTION, SOLUTION INTRAVENOUS at 06:46

## 2017-12-01 RX ADMIN — INSULIN GLARGINE 45 UNITS: 100 INJECTION, SOLUTION SUBCUTANEOUS at 22:41

## 2017-12-01 RX ADMIN — ALUMINUM HYDROXIDE, MAGNESIUM HYDROXIDE, AND SIMETHICONE 30 ML: 200; 200; 20 SUSPENSION ORAL at 05:29

## 2017-12-01 RX ADMIN — SODIUM CHLORIDE 1000 ML: 0.9 INJECTION, SOLUTION INTRAVENOUS at 05:05

## 2017-12-01 RX ADMIN — ALUMINUM HYDROXIDE, MAGNESIUM HYDROXIDE, AND SIMETHICONE 30 ML: 200; 200; 20 SUSPENSION ORAL at 12:55

## 2017-12-01 RX ADMIN — MAGNESIUM SULFATE HEPTAHYDRATE 2 G: 40 INJECTION, SOLUTION INTRAVENOUS at 11:42

## 2017-12-01 RX ADMIN — POTASSIUM CHLORIDE 40 MEQ: 1500 TABLET, EXTENDED RELEASE ORAL at 16:13

## 2017-12-01 RX ADMIN — HEPARIN SODIUM 5000 UNITS: 5000 INJECTION, SOLUTION INTRAVENOUS; SUBCUTANEOUS at 18:40

## 2017-12-01 RX ADMIN — POTASSIUM CHLORIDE 40 MEQ: 400 INJECTION, SOLUTION INTRAVENOUS at 13:55

## 2017-12-01 RX ADMIN — DEXTROSE AND SODIUM CHLORIDE 250 ML/HR: 5; .9 INJECTION, SOLUTION INTRAVENOUS at 08:15

## 2017-12-01 NOTE — ED PROVIDER NOTES
History  Chief Complaint   Patient presents with    Medication Problem      PT BROUGHT IN BY BLS FOR REACTION TO NEWLY STARTED MEDICATION FOR DIABETES    PT WAS JUST DIAGNOSED OF DIABETES A WEEK AGO, STARTED ON METFORMIN AND HAS BEEN VOMITING  FEELING LIGHT HEADED, INDIGESTION EVER SINCE  METFORMINE STOPPED YESTERDAY AND STARTED ON Lyle Lambing  PT CONTINUES TO HAVE THOSE SYMPTOMS  HPI patient is a 43-year-old male, recently seen at [facility], apparently had diabetic ketoacidosis and started on metformin  Patient was seen here on November 25th with an elevated blood sugar the day he was released from [facility]  Patient was placed on metformin  Patient reports not able to tolerate the metformin has been vomiting  Patient was seen by his doctor placed on Januvia  He reports he still feels ill with some nausea vomiting and now has developed some anterior chest pain  Patient describes an uncomfortable feeling from his epigastric region which somewhat radiates up into his chest   He denies any difficulty breathing  Denies any shortness of breath  There is no loss of consciousness  Patient reports he feels extremely dehydrated any feels like his medication is not working  He believes his sugars elevated again  Past medical history recently dilated dose with diabetes, hypertension,  Family history noncontributory  Social history, 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 Taking?    Lancets (FREESTYLE) lancets   No Yes   Sig: Use as instructed   hydrochlorothiazide (HYDRODIURIL) 5 mg/mL SUSP oral suspension   Yes Yes   Sig: Take 25 mg by mouth   lisinopril (ZESTRIL) 40 mg tablet   Yes Yes   Sig: Take 40 mg by mouth   metFORMIN (GLUCOPHAGE) 500 mg tablet   No No   Sig: Take 1 tablet by mouth 2 (two) times a day with meals for 30 days   sitaGLIPtin (JANUVIA) 100 mg tablet   Yes Yes   Sig: Take 100 mg by mouth daily      Facility-Administered Medications: None       Past Medical History:   Diagnosis Date    Diabetes mellitus ([facility] [address] )     Hypertension        Past Surgical History:   Procedure Laterality Date    CARDIAC SURGERY      ROTATOR CUFF REPAIR         History reviewed  No pertinent family history  I have reviewed and agree with the history as documented  Social History   Substance Use Topics    Smoking status: Former Smoker    Smokeless tobacco: Never Used    Alcohol use No        Review of Systems   Constitutional: Negative for diaphoresis, fatigue and fever  HENT: Negative for congestion, ear pain, nosebleeds and sore throat  Eyes: Negative for photophobia, pain, discharge and visual disturbance  Respiratory: Negative for cough, choking, chest tightness, shortness of breath and wheezing  Cardiovascular: Positive for chest pain  Negative for palpitations  Gastrointestinal: Positive for nausea and vomiting  Negative for abdominal distention, abdominal pain and diarrhea  Genitourinary: Negative for dysuria, flank pain and frequency  Musculoskeletal: Negative for back pain, gait problem and joint swelling  Skin: Negative for color change and rash  Neurological: Negative for dizziness, syncope and headaches  Psychiatric/Behavioral: Negative for behavioral problems and confusion  The patient is not nervous/anxious  All other systems reviewed and are negative        Physical Exam  ED Triage Vitals [12/01/17 0423]   Temperature Pulse Respirations Blood Pressure SpO2   97 7 °F (36 5 °C) (!) 116 18 139/89 96 %      Temp Source Heart Rate Source Patient Position - Orthostatic VS BP Location FiO2 (%)   Oral Monitor Lying Right arm --      Pain Score       --           Orthostatic Vital Signs  Vitals:    12/01/17 0423   BP: 139/89   Pulse: (!) 116   Patient Position - Orthostatic VS: Lying       Physical Exam   Constitutional: He is oriented to person, place, and time  He appears well-developed and well-nourished  HENT:   Head: Normocephalic  Right Ear: External ear normal    Left Ear: External ear normal    Nose: Nose normal    Mouth/Throat: Oropharynx is clear and moist    Eyes: EOM and lids are normal  Pupils are equal, round, and reactive to light  Neck: Normal range of motion  Neck supple  Cardiovascular: Normal rate, regular rhythm, normal heart sounds and intact distal pulses  Pulmonary/Chest: Effort normal and breath sounds normal  No respiratory distress  Abdominal: Soft  Bowel sounds are normal  He exhibits no distension and no mass  There is no tenderness  There is no rebound and no guarding  No hernia  Musculoskeletal: Normal range of motion  He exhibits no deformity  Neurological: He is alert and oriented to person, place, and time  Skin: Skin is warm and dry  Psychiatric: He has a normal mood and affect  Nursing note and vitals reviewed     Pulse oximetry 96% on room air adequate oxygenation, no hypoxia    ED Medications  Medications   insulin regular (HumuLIN R,NovoLIN R) 1 Units/mL in sodium chloride 0 9 % 100 mL infusion (not administered)   sodium chloride 0 9 % infusion (not administered)     Followed by   sodium chloride 0 9 % infusion (not administered)     Followed by   sodium chloride 0 9 % infusion (not administered)   dextrose 5 % and sodium chloride 0 9 % infusion (not administered)   ondansetron (ZOFRAN) injection 4 mg (4 mg Intravenous Given 12/1/17 0508)   aluminum-magnesium hydroxide-simethicone (MYLANTA) 200-200-20 mg/5 mL oral suspension 30 mL (30 mL Oral Given 12/1/17 0529)   sodium chloride 0 9 % bolus 1,000 mL (0 mL Intravenous Stopped 12/1/17 0553)       Diagnostic Studies  Results Reviewed     Procedure Component Value Units Date/Time    Urine Microscopic [[id number]]  (Abnormal) Collected:  12/01/17 0531    Lab Status:  Final result Specimen:  Urine from Urine, Clean Catch Updated:  12/01/17 6036 RBC, UA None Seen /hpf      WBC, UA None Seen /hpf      Epithelial Cells Occasional /hpf      Bacteria, UA Occasional /hpf      Hyaline Casts, UA 2-4 (A) /lpf      COARSE GRANULAR CASTS 4-5 /lpf      MUCOUS THREADS Occasional    UA w Reflex to Microscopic w Reflex to Culture [[id number]]  (Abnormal) Collected:  12/01/17 0531    Lab Status:  Final result Specimen:  Urine from Urine, Clean Catch Updated:  12/01/17 0536     Color, UA Yellow     Clarity, UA Clear     Specific Gravity, UA >=1 030     pH, UA 5 0     Leukocytes, UA Negative     Nitrite, UA Negative     Protein, UA Negative mg/dl      Glucose,  (1/2%) (A) mg/dl      Ketones, UA >=80 (3+) (A) mg/dl      Urobilinogen, UA 0 2 E U /dl      Bilirubin, UA Small (A)     Blood, UA Trace-lysed (A)    Troponin I [[id number]]  (Normal) Collected:  12/01/17 0456    Lab Status:  Final result Specimen:  Blood from Arm, Left Updated:  12/01/17 0533     Troponin I <0 02 ng/mL     Narrative:         [employer] Chemistry analyzer 99% cutoff is > 0 04 ng/mL in network labs    o cTnI 99% cutoff is useful only when applied to patients in the clinical setting of myocardial ischemia  o cTnI 99% cutoff should be interpreted in the context of clinical history, ECG findings and possibly cardiac imaging to establish correct diagnosis  o cTnI 99% cutoff may be suggestive but clearly not indicative of a coronary event without the clinical setting of myocardial ischemia      Hepatic function panel [[id number]]  (Abnormal) Collected:  12/01/17 0456    Lab Status:  Final result Specimen:  Blood from Arm, Left Updated:  12/01/17 0526     Total Bilirubin 0 80 mg/dL      Bilirubin, Direct 0 22 (H) mg/dL      Alkaline Phosphatase 73 U/L      AST 22 U/L      ALT 55 U/L      Total Protein 8 5 (H) g/dL      Albumin 4 1 g/dL     Lipase [[id number]]  (Normal) Collected:  12/01/17 0456    Lab Status:  Final result Specimen:  Blood from Arm, Left Updated:  12/01/17 0526     Lipase 184 u/L     Basic metabolic panel [[id number]]  (Abnormal) Collected:  12/01/17 0456    Lab Status:  Final result Specimen:  Blood from Arm, Left Updated:  12/01/17 0526     Sodium 125 (L) mmol/L      Potassium 4 6 mmol/L      Chloride 86 (L) mmol/L      CO2 14 (L) mmol/L      Anion Gap 25 (H) mmol/L      BUN 41 (H) mg/dL      Creatinine 2 14 (H) mg/dL      Glucose 495 (H) mg/dL      Calcium 10 4 (H) mg/dL      eGFR 43 ml/min/1 73sq m     Narrative:         National Kidney Disease Education Program recommendations are as follows:  GFR calculation is accurate only with a steady state creatinine  Chronic Kidney disease less than 60 ml/min/1 73 sq  meters  Kidney failure less than 15 ml/min/1 73 sq  meters      Acetone [[id number]]  (Abnormal) Collected:  12/01/17 0456    Lab Status:  Final result Specimen:  Blood from Arm, Left Updated:  12/01/17 0519     Acetone, Bld 1+ (A)    Blood gas, venous [[id number]]  (Abnormal) Collected:  12/01/17 0456    Lab Status:  Final result Specimen:  Blood from Arm, Left Updated:  12/01/17 0509     pH, Brian 7 346     pCO2, Brian 24 2 (L) mm Hg      pO2, Brian 87 5 (H) mm Hg      HCO3, Brian 12 9 (L) mmol/L      Base Excess, Brian -10 5 mmol/L      O2 Content, Brian 21 0 ml/dL      O2 HGB, VENOUS 92 1 (H) %     CBC and differential [[id number]]  (Abnormal) Collected:  12/01/17 0456    Lab Status:  Final result Specimen:  Blood from Arm, Left Updated:  12/01/17 0508     WBC 13 82 (H) Thousand/uL      RBC 5 68 (H) Million/uL      Hemoglobin 15 3 g/dL      Hematocrit 44 9 %      MCV 79 (L) fL      MCH 26 9 pg      MCHC 34 1 g/dL      RDW 11 9 %      MPV 13 5 (H) fL      Platelets 658 Thousands/uL      nRBC 0 /100 WBCs      Neutrophils Relative 74 %      Lymphocytes Relative 20 %      Monocytes Relative 6 %      Eosinophils Relative 0 %      Basophils Relative 0 %      Neutrophils Absolute 10 17 (H) Thousands/µL      Lymphocytes Absolute 2 72 Thousands/µL      Monocytes Absolute 0 76 Thousand/µL      Eosinophils Absolute 0 06 Thousand/µL      Basophils Absolute 0 06 Thousands/µL                  XR chest pa & lateral    (Results Pending)              Procedures  ECG 12 Lead Documentation  Date/Time: 12/1/2017 4:45 AM  Performed by: Pascale Johnson  Authorized by: Pascale Johnson     Indications / Diagnosis:  Chest pain, vomiting, diabetes out of control  ECG reviewed by me, the ED Provider: yes    Patient location:  ED  Previous ECG:     Previous ECG:  Unavailable  Interpretation:     Interpretation: non-specific    Rate:     ECG rate:  114    ECG rate assessment: tachycardic    Rhythm:     Rhythm: sinus tachycardia    Ectopy:     Ectopy: none    Conduction:     Conduction: normal    ST segments:     ST segments:  Normal  Comments:      Sinus tachycardia otherwise normal tracing  Phone Contacts  ED Phone Contact    ED Course  ED Course         diagnostic testing showed a venous blood gas of pH is 7 34 minimally reduced, pCO2 was 24, bicarb was 12 9  Patient's white count was elevated at 13 8, the patient's acetone was positive at +1     Chest x-ray: Chest x-ray showed a normal cardiac silhouette, no pneumothorax no infiltrates, No sign of pathology, interpreted by me, I was the primary   cardiac troponin was negative no sign of cardiac ischemia  MDM medical decision making 32-year-old male presents with vomiting after starting metformin and switched to Januvia, new onset diabetes as of the end of last month, now with persistent vomiting and patient reports dehydrated  Patient complained of some epigastric and reflux type pain  Patient had a normal EKG normal cardiac troponin no sign of cardiac ischemia  Patient had markedly abnormal electrolytes with hyponatremia, hyperglycemia, normal pH but some serum acetone,  diabetic ketoacidosis  Patient will require IV insulin  I discussed with the advanced practitioner in the intensive care unit they will see the patient, I started an insulin drip    I spoke with the attending from the intensive care unit patient will receive IV fluids and IV insulin  Critical care time 60 minutes  CritCare Time    Disposition  Final diagnoses:   Hyponatremia   Hyperglycemia   Diabetic ketoacidosis ([facility] [address] )     Time reflects when diagnosis was documented in both MDM as applicable and the Disposition within this note     Time User Action Codes Description Comment    12/1/2017  5:32 AM [place] Box Add [E87 1] Hyponatremia     12/1/2017  5:32 AM [place] Box Add [R73 9] Hyperglycemia     12/1/2017  5:42 AM [place] Box Add [E13 10] Diabetic ketoacidosis [facility])       ED Disposition     ED Disposition Condition Comment    Admit  Case was discussed with Dr Jose Mac and the patient's admission status was agreed to be 2 midnights the service of [address] [place] [address]   Follow-up Information    None       Patient's Medications   Discharge Prescriptions    No medications on file     No discharge procedures on file      ED Provider  Electronically Signed by           Jareth Almodovar MD  12/01/17 2650

## 2017-12-01 NOTE — SOCIAL WORK
Cm met with pt at bedside  He lives with his wife Rashad Hinkler in a bi-level home with 7 steps w/ railing to enter the residence and 7 steps w/railing to reach the main living area  He normally has no problem navigating steps and takes care of all ADL's  He uses a glucometer  He has never been in rehab or used OP/PT  He did use HH services following heart surgery in 2015, but does not remember the name of the agency  He denies issues with drugs or alcohol  Denies hx of anxiety or depression  His PCP is Dr Jeffrey Goff  He does not have a POA or Advanced Directive and does not want info at this time  He uses [facility] and has no problem with his co-pays  He is retired, 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

## 2017-12-01 NOTE — CONSULTS
Consultation - Avi Lehman 38 y o  male MRN: [id number]    Unit/Bed#:  Encounter: [id number]      Assessment/Plan     Assessment: This is a 38y o -year-old male with newly diagnosed diabetes with diabetic ketoacidosis, renal insufficiency  Plan: Will continue IV insulin infusion until bedtime tonight  Switch to critical care IV insulin infusion-fingersticks need to be monitored q 2 hours on IV insulin infusion  Anion gap has normalized so will switch to subcu insulin therapy starting at bedtime tonight   He will receive Lantus 45 units at bedtime and IV insulin infusion and D5 will be discontinued 1 hour later  Diet is being advanced, start Humalog 12 units before meals tomorrow  Fingersticks to be monitored before meals and bedtime starting tomorrow  Patient likely has type 2 diabetes however Will check deanna and islet cell antibodies as well as C-peptide level as type 1 diabetes is also a possibility  Agree with stopping metformin  Renal insufficiency-unclear if acute or chronic-management as per primary team    Patient will need diabetes education, insulin teaching prior to discharge  Outpatient follow-up with Endocrinology in the next 2-3 weeks          CC: Diabetes Consult    History of Present Illness     HPI: Avi Lehman is a 38y o  year old male with history of hypertension and newly diagnosed diabetes who presented to the hospital with complaints of abdominal pain, nausea and vomiting  On admission he was found to be in diabetic ketoacidosis and started on DKA protocol with improvement in his blood sugars and normalization of the anion gap  Endocrine consult is requested for management of diabetes  Patient states that he has been having symptoms of polyuria, polydipsia for about a month    Complains of fatigue, complains of nocturia, was admitted to [address] [place] [address] about a week back however signed out- came to the ED on November 25th with elevated blood sugar and was given a prescription off metformin  He states that he was checking his blood sugars at home and sugars are high, he also started having abdominal pain, nausea and vomiting and thus came to the ED for evaluation  He states that he has lost about 15 lb in the past few weeks  Current he feeling better and appetite is improving  He has not had any recent nausea or vomiting or abdominal     Consultation performed using 2 way video monitor  Inpatient consult to Endocrinology  Consult performed by: Kam Martins ordered by: Sergei Baker          Review of Systems   Constitutional: Positive for activity change, appetite change, fatigue and unexpected weight change  Negative for fever  Eyes: Negative for visual disturbance  Respiratory: Positive for shortness of breath  Negative for cough  Cardiovascular: Negative for chest pain, palpitations and leg swelling  Gastrointestinal: Positive for abdominal pain, nausea and vomiting  Negative for constipation and diarrhea  Endocrine: Positive for polydipsia and polyuria  Skin: Negative for color change, pallor, rash and wound  Psychiatric/Behavioral: Positive for sleep disturbance  Negative for agitation, behavioral problems and confusion  All other systems reviewed and are negative        Historical Information   Past Medical History:   Diagnosis Date    Diabetes mellitus ([facility] [address] )     Hypertension      Past Surgical History:   Procedure Laterality Date    CARDIAC SURGERY      ROTATOR CUFF REPAIR       Social History   History   Alcohol Use No     History   Drug Use    Types: Marijuana     History   Smoking Status    Former Smoker   Smokeless Tobacco    Never Used     Family History:  Grandmother with type 2 diabetes    Meds/Allergies   Current Facility-Administered Medications   Medication Dose Route Frequency Provider Last Rate Last Dose    aluminum-magnesium hydroxide-simethicone (MYLANTA) 200-200-20 mg/5 mL oral suspension 30 mL  30 mL Oral Q4H PRN [place], CRNP   30 mL at 12/01/17 1255    chlorhexidine (PERIDEX) 0 12 % oral rinse 15 mL  15 mL Swish [employer] Spit Q12H [address], CRNP        dextrose 5 % and sodium chloride 0 9 % infusion  250 mL/hr Intravenous Continuous [place], CRNP 250 mL/hr at 12/01/17 1143 250 mL/hr at 12/01/17 1143    heparin (porcine) subcutaneous injection 5,000 Units  5,000 Units Subcutaneous [facility] [place] Alicia, CRNP   5,000 Units at 12/01/17 1049    insulin regular (HumuLIN R,NovoLIN R) 1 Units/mL in sodium chloride 0 9 % 100 mL infusion  0 1-30 Units/hr Intravenous Continuous [place], CRNP 1 mL/hr at 12/01/17 1423 1 Units/hr at 12/01/17 1423    ondansetron (ZOFRAN) injection 4 mg  4 mg Intravenous Q4H PRN [place] Alic[place], CRNP        pantoprazole (PROTONIX) injection 40 mg  40 mg Intravenous Q24H [address] [place], CRNP   40 mg at 12/01/17 1255    potassium chloride 40 mEq IVPB (premix)  40 mEq Intravenous Once [place], CRNP 25 mL/hr at 12/01/17 1355 40 mEq at 12/01/17 1355    potassium chloride 40 mEq IVPB (premix)  40 mEq Intravenous Once [place], CRNP        sodium chloride 0 9 % infusion  250 mL/hr Intravenous Continuous Fredy Bernheim, MD        sodium chloride 0 9 % infusion  250 mL/hr Intravenous Continuous [place], CRNP         No Known Allergies    Objective   Vitals: Blood pressure (!) 81/47, pulse 82, temperature 98 2 °F (36 8 °C), temperature source Oral, resp  rate 13, height 5' 11" (1 803 m), weight 116 kg (256 lb 6 3 oz), SpO2 99 %  Intake/Output Summary (Last 24 hours) at 12/01/17 1456  Last data filed at 12/01/17 1401   Gross per 24 hour   Intake          1941 67 ml   Output              550 ml   Net          1391 67 ml     Invasive Devices     Peripheral Intravenous Line            Peripheral IV 12/01/17 Left Hand less than 1 day                Physical Exam   Constitutional: He is oriented to person, place, and time   He appears well-developed and well-nourished  No distress  Neurological: He is alert and oriented to person, place, and time  Skin:   Normal in color   Psychiatric: He has a normal mood and affect  His behavior is normal  Judgment and thought content normal     Moving all extremities    The history was obtained from the review of the chart, patient  Lab Results:       Lab Results   Component Value Date    WBC 13 82 (H) 12/01/2017    HGB 15 3 12/01/2017    HCT 44 9 12/01/2017    MCV 79 (L) 12/01/2017     12/01/2017     Lab Results   Component Value Date/Time    BUN 32 (H) 12/01/2017 02:30 PM     (L) 12/01/2017 02:30 PM    K 3 5 12/01/2017 02:30 PM     12/01/2017 02:30 PM    CO2 24 12/01/2017 02:30 PM    CREATININE 1 82 (H) 12/01/2017 02:30 PM    AST 22 12/01/2017 04:56 AM    ALT 55 12/01/2017 04:56 AM     No results for input(s): CHOL, HDL, LDL, TRIG, VLDL in the last 72 hours  No results found for: [employer]  POC Glucose (mg/dl)   Date Value   12/01/2017 114   12/01/2017 80   12/01/2017 94   12/01/2017 117   12/01/2017 189 (H)   12/01/2017 213 (H)   12/01/2017 234 (H)   12/01/2017 307 (H)   12/01/2017 393 (H)   11/26/2017 341 (H)       Imaging Studies: I have personally reviewed pertinent reports  XR chest pa & lateral [[id number]] Collected: 12/01/17 0901   Order Status: Completed Updated: 12/01/17 0911   Narrative:     CHEST - DUAL ENERGY    INDICATION:  Mid chest burning sensation   Nausea   Lightheadedness   Diabetes  COMPARISON:  None    VIEWS:  PA (including soft tissue/bone algorithms) and lateral projections    IMAGES:  2    FINDINGS:         Cardiomediastinal silhouette appears unremarkable  The lungs are clear   No pneumothorax or pleural effusion  There is relative elevation of right hemidiaphragm  Visualized osseous structures appear within normal limits for the patient's age  Impression:       No active pulmonary disease         Portions of the record may have been created with voice recognition software

## 2017-12-01 NOTE — H&P
History and Physical - Critical Care   Waldron Ganser 38 y o  male MRN: [id number]  Unit/Bed#:  Encounter: [id number]    Reason for Admission / Chief Complaint:  Nausea and vomiting     History of Present Illness:  Waldron Ganser is a 38 y o  male who presents with a past medical history of hypertension  He has a recently diagnosed diabetic  He was recently at [facility] for treatment of diabetic ketoacidosis  Unfortunately, he left against medical advice given difficulty with the staff there  He came to our emergency department on November 25th with an elevated blood sugar  He was given a prescription for metformin  He states that he felt that he was not able to tolerate the metformin and gave him indigestion  He then started having abdominal pain nausea and vomiting  He felt that he was fatigued and extremely dehydrated  This prompted his visit to the ED today  He denies fever chills shortness of breath palpitations or chest pain  He was noted to be in DKA in the ED  He is referred to the step-down unit for further management  History obtained from chart review and the patient  Past Medical History:  Past Medical History:   Diagnosis Date    Diabetes mellitus ([address] )     Hypertension        Past Surgical History:  Past Surgical History:   Procedure Laterality Date    CARDIAC SURGERY      ROTATOR CUFF REPAIR         Past Family History:  History reviewed  No pertinent family history      Social History:  History   Smoking Status    Former Smoker   Smokeless Tobacco    Never Used     History   Alcohol Use No     History   Drug Use    Types: Marijuana     Marital Status: Single    Medications:  Current Facility-Administered Medications   Medication Dose Route Frequency    chlorhexidine (PERIDEX) 0 12 % oral rinse 15 mL  15 mL Swish & Spit Q12H [address]    dextrose 5 % and sodium chloride 0 9 % infusion  250 mL/hr Intravenous Continuous    heparin (porcine) subcutaneous injection 5,000 Units  5,000 Units Subcutaneous Q8H [facility] & half-way    insulin regular (HumuLIN R,NovoLIN R) 1 Units/mL in sodium chloride 0 9 % 100 mL infusion  0 1-30 Units/hr Intravenous Continuous    ondansetron (ZOFRAN) injection 4 mg  4 mg Intravenous Q4H PRN    sodium chloride 0 9 % infusion  500 mL/hr Intravenous Continuous    Followed by   Learta January sodium chloride 0 9 % infusion  250 mL/hr Intravenous Continuous    sodium chloride 0 9 % infusion  500 mL/hr Intravenous Continuous    Followed by   Learta January sodium chloride 0 9 % infusion  250 mL/hr Intravenous Continuous     Home medications:  Prior to Admission medications    Medication Sig Start Date End Date Taking? Authorizing Provider   hydrochlorothiazide (HYDRODIURIL) 5 mg/mL SUSP oral suspension Take 25 mg by mouth   Yes Historical Provider, MD   Lancets (FREESTYLE) lancets Use as instructed 11/26/17  Yes Uri Laurent DO   lisinopril (ZESTRIL) 40 mg tablet Take 40 mg by mouth   Yes Historical Provider, MD   sitaGLIPtin (JANUVIA) 100 mg tablet Take 100 mg by mouth daily   Yes Historical Provider, MD   metFORMIN (GLUCOPHAGE) 500 mg tablet Take 1 tablet by mouth 2 (two) times a day with meals for 30 days 11/26/17 12/26/17  Denys Koch DO     Allergies:  No Known Allergies    ROS:   Review of Systems   Constitutional: Positive for activity change, appetite change and fatigue  HENT: Negative  Eyes: Negative  Respiratory: Negative  Cardiovascular: Negative  Gastrointestinal: Positive for abdominal pain, nausea and vomiting  Endocrine: Positive for polyuria  Genitourinary: Positive for frequency  Musculoskeletal: Negative  Skin: Negative  Allergic/Immunologic: Negative  Neurological: Negative  Hematological: Negative  Psychiatric/Behavioral: Negative          Vitals:  Vitals:    12/01/17 0630 12/01/17 0700 12/01/17 0724 12/01/17 0800   BP: 127/70 127/70  123/66   Pulse: 102 99  100   Resp: 22 18  15   Temp: 98 °F (36 7 °C)   98 1 °F (36 7 °C) TempSrc: Oral      SpO2: 100% 100%  99%   Weight:   116 kg (256 lb 6 3 oz) 116 kg (256 lb 6 3 oz)   Height:    5' 11" (1 803 m)     Temperature:   Temp (24hrs), Av 9 °F (36 6 °C), Min:97 7 °F (36 5 °C), Max:98 1 °F (36 7 °C)    Current Temperature: 98 1 °F (36 7 °C)    Weights:   IBW: 75 3 kg  Body mass index is 35 76 kg/m²  Hemodynamic Monitoring:  N/A     Non-Invasive/Invasive Ventilation Settings:  Respiratory    Lab Data (Last 4 hours)    None         O2/Vent Data (Last 4 hours)    None              No results found for: PHART, NEC2YDV, PO2ART, UYL9PDN, V0NHNJAI, BEART, SOURCE  SpO2: SpO2: 99 %     Physical Exam  PHYSICAL EXAM  General :   Well developed, well nourished, age appropriate affect, behavior, orientation, thought content, thought processes, judgement, and insight  Articulate  English primary language  Neuro:   GCS= 15 CN 2-12 intact  Non Focal  HEENT:  Normocephalic, atraumatic, Pupils 3 brisk bilat  PERRLA, EOMI, hearing grossly intact  Symmetrical facial expressions without droop or slurred speech  Tongue midline without fasiculations  Neck:  No JVD, FROM, No masses/adenopathy  Back:   Symmetrical, atruamatic, spinous process pain free on palpation  Cardiovascular:   No heaves,lifts,thrills  S1/S2 [email or web address] No noted MRGC  Pulmonary:   Symmetrical expansion of chest  Resp even & unlabored  GI :   Abd SNT + BSX4 quads  No palp/pulsitile masses  :  N/A  Musculoskeletal:  Symmetrical  No obvious deformity  FROM in UE & LE  Muscle strength 5/5  No lymphadenopathy  Extrem warm to touch  DTR grossly intact  Brachial/radial/femoral/popliteal/pedal/posterior tibial pulses +2  No lesions noted  CN 2-12 grossly intact  No rhomberg  Sensation and motor function intact      Labs:    Results from last 7 days  Lab Units 17  0456 17  0024   WBC Thousand/uL 13 82* 10 75*   HEMOGLOBIN g/dL 15 3 15 1   HEMATOCRIT % 44 9 44 5   PLATELETS Thousands/uL 202 155   NEUTROS PCT % 74 61   MONOS PCT % 6 6      Results from last 7 days  Lab Units 12/01/17  0456 11/26/17  0023   SODIUM mmol/L 125* 130*   POTASSIUM mmol/L 4 6 3 7   CHLORIDE mmol/L 86* 93*   CO2 mmol/L 14* 21   BUN mg/dL 41* 21   CREATININE mg/dL 2 14* 1 42*   CALCIUM mg/dL 10 4* 9 3   TOTAL PROTEIN g/dL 8 5*  --    BILIRUBIN TOTAL mg/dL 0 80  --    ALK PHOS U/L 73  --    ALT U/L 55  --    AST U/L 22  --    GLUCOSE RANDOM mg/dL 495* 389*                        0  Lab Value Date/Time   TROPONINI <0 02 12/01/2017 0456       Imaging:   XR chest pa & lateral   Final Result      No active pulmonary disease  Workstation performed: [id number]           EKG: ST This was personally reviewed by myself  Micro:  No results found for: Chato Terrell, W[facility]CULT, [facility]    ______________________________________________________________________    Assessment:   1  DKA  2  DM II   3  LIANG  4  Leukocytosis  5  High anion gap metabolic acidosis     Plan:      Neuro:   Monitor neuro status   CAM ICU  Sleep hygiene     CV: Monitor hemodynamic  Continue IV fluid resuscitation   Will hold home dose lisinopril and hydrochlorothiazide in the setting of a LIANG    Lung:   Pulmonary hygiene  Early mobilization  Incentive spirometry   GI:   NPO until anion gap closes   FEN:   Monitor electrolytes and replete as necessary  Continue IV fluid resuscitation  Monitor BMP Mag Ical vbg q 6 hours   :    Monitor urine output closely  UA was negative for infectious etiology   ID:   There is no infectious etiology  Monitor white blood cells and temperature curve   Heme:   Heparin subcutaneously for DVT prophylaxis   Endo:   Started on DKA protocol  Will consult Endocrinology given new diagnosis of diabetes   Msk/Skin:   Turn and reposition while in bed  Early mobilization   Disposition:   Monitor in step-down while on DKA protocol    ______________________________________________________________________    VTE Pharmacologic Prophylaxis: Sequential compression device (Venodyne)  and Heparin  VTE Mechanical Prophylaxis: sequential compression device    Invasive lines and devices: Invasive Devices     Peripheral Intravenous Line            Peripheral IV 12/01/17 Left Hand less than 1 day                Code Status: Level 1 - Full Code  POA:    POLST:      Given critical illness, patient length of stay will require greater than two midnights  Counseling / Coordination of Care  Total Critical Care time spent 49 minutes excluding procedures, teaching and family updates  Portions of the record may have been created with voice recognition software  Occasional wrong word or "sound a like" substitutions may have occurred due to the inherent limitations of voice recognition software  Read the chart carefully and recognize, using context, where substitutions have occurred        AR Scott

## 2017-12-01 NOTE — PLAN OF CARE
DISCHARGE PLANNING - CARE MANAGEMENT     Discharge to post-acute care or home with appropriate resources Progressing        Nutrition/Hydration-ADULT     Nutrient/Hydration intake appropriate for improving, restoring or maintaining nutritional needs Progressing        Prexisting or High Potential for Compromised Skin Integrity     Skin integrity is maintained or improved Progressing

## 2017-12-01 NOTE — CONSULTS
Provided intial DM diet education  Reviewed carb containing foods, carb portion sizes, eating more balanced meals throughout the day  Pt usually skips breakfast  Provided MNT outpatient information to schedule appointment after discharge  Pt states he is finally beginning to feel hungry  If he tolerates diabetic clear liquids, advance diet to Consistent Carb level 1  Will continue to monitor during stay

## 2017-12-02 PROBLEM — R11.0 NAUSEA: Status: ACTIVE | Noted: 2017-12-02

## 2017-12-02 PROBLEM — N17.9 AKI (ACUTE KIDNEY INJURY) (HCC): Status: ACTIVE | Noted: 2017-12-02

## 2017-12-02 PROBLEM — E11.10 DIABETIC KETOACIDOSIS (HCC): Status: ACTIVE | Noted: 2017-12-02

## 2017-12-02 PROBLEM — E87.1 HYPONATREMIA: Status: ACTIVE | Noted: 2017-12-02

## 2017-12-02 LAB
ANION GAP SERPL CALCULATED.3IONS-SCNC: 10 MMOL/L (ref 4–13)
ANION GAP SERPL CALCULATED.3IONS-SCNC: 10 MMOL/L (ref 4–13)
BACTERIA UR QL AUTO: ABNORMAL /HPF
BASE EX.OXY STD BLDV CALC-SCNC: 81.1 % (ref 60–80)
BASE EX.OXY STD BLDV CALC-SCNC: 94.1 % (ref 60–80)
BASE EXCESS BLDV CALC-SCNC: -4.6 MMOL/L
BASE EXCESS BLDV CALC-SCNC: -5.3 MMOL/L
BASOPHILS # BLD AUTO: 0.04 THOUSANDS/ΜL (ref 0–0.1)
BASOPHILS NFR BLD AUTO: 0 % (ref 0–1)
BILIRUB UR QL STRIP: NEGATIVE
BUN SERPL-MCNC: 26 MG/DL (ref 5–25)
BUN SERPL-MCNC: 26 MG/DL (ref 5–25)
CA-I BLD-SCNC: 1.07 MMOL/L (ref 1.12–1.32)
CALCIUM SERPL-MCNC: 7.8 MG/DL (ref 8.3–10.1)
CALCIUM SERPL-MCNC: 8.2 MG/DL (ref 8.3–10.1)
CHLORIDE SERPL-SCNC: 103 MMOL/L (ref 100–108)
CHLORIDE SERPL-SCNC: 104 MMOL/L (ref 100–108)
CHLORIDE UR-SCNC: 18 MMOL/L (ref 10–330)
CLARITY UR: CLEAR
CO2 SERPL-SCNC: 21 MMOL/L (ref 21–32)
CO2 SERPL-SCNC: 21 MMOL/L (ref 21–32)
COLOR UR: ABNORMAL
CREAT SERPL-MCNC: 1.79 MG/DL (ref 0.6–1.3)
CREAT SERPL-MCNC: 1.9 MG/DL (ref 0.6–1.3)
CREAT UR-MCNC: 99.3 MG/DL
CREAT UR-MCNC: 99.3 MG/DL
EOSINOPHIL # BLD AUTO: 0.19 THOUSAND/ΜL (ref 0–0.61)
EOSINOPHIL NFR BLD AUTO: 2 % (ref 0–6)
ERYTHROCYTE [DISTWIDTH] IN BLOOD BY AUTOMATED COUNT: 12 % (ref 11.6–15.1)
EST. AVERAGE GLUCOSE BLD GHB EST-MCNC: 263 MG/DL
GFR SERPL CREATININE-BSD FRML MDRD: 50 ML/MIN/1.73SQ M
GFR SERPL CREATININE-BSD FRML MDRD: 54 ML/MIN/1.73SQ M
GLUCOSE SERPL-MCNC: 115 MG/DL (ref 65–140)
GLUCOSE SERPL-MCNC: 146 MG/DL (ref 65–140)
GLUCOSE SERPL-MCNC: 149 MG/DL (ref 65–140)
GLUCOSE SERPL-MCNC: 164 MG/DL (ref 65–140)
GLUCOSE SERPL-MCNC: 176 MG/DL (ref 65–140)
GLUCOSE SERPL-MCNC: 198 MG/DL (ref 65–140)
GLUCOSE SERPL-MCNC: 212 MG/DL (ref 65–140)
GLUCOSE SERPL-MCNC: 278 MG/DL (ref 65–140)
GLUCOSE SERPL-MCNC: 362 MG/DL (ref 65–140)
GLUCOSE SERPL-MCNC: 406 MG/DL (ref 65–140)
GLUCOSE SERPL-MCNC: 61 MG/DL (ref 65–140)
GLUCOSE SERPL-MCNC: 68 MG/DL (ref 65–140)
GLUCOSE UR STRIP-MCNC: ABNORMAL MG/DL
HBA1C MFR BLD: 10.8 % (ref 4.2–6.3)
HCO3 BLDV-SCNC: 18.3 MMOL/L (ref 24–30)
HCO3 BLDV-SCNC: 19.8 MMOL/L (ref 24–30)
HCT VFR BLD AUTO: 39.5 % (ref 36.5–49.3)
HGB BLD-MCNC: 13.3 G/DL (ref 12–17)
HGB UR QL STRIP.AUTO: NEGATIVE
KETONES UR STRIP-MCNC: NEGATIVE MG/DL
LEUKOCYTE ESTERASE UR QL STRIP: ABNORMAL
LYMPHOCYTES # BLD AUTO: 2.94 THOUSANDS/ΜL (ref 0.6–4.47)
LYMPHOCYTES NFR BLD AUTO: 29 % (ref 14–44)
MAGNESIUM SERPL-MCNC: 1.9 MG/DL (ref 1.6–2.6)
MAGNESIUM SERPL-MCNC: 2 MG/DL (ref 1.6–2.6)
MCH RBC QN AUTO: 26.9 PG (ref 26.8–34.3)
MCHC RBC AUTO-ENTMCNC: 33.7 G/DL (ref 31.4–37.4)
MCV RBC AUTO: 80 FL (ref 82–98)
MICROALBUMIN UR-MCNC: 19.7 MG/L (ref 0–20)
MICROALBUMIN/CREAT 24H UR: 20 MG/G CREATININE (ref 0–30)
MONOCYTES # BLD AUTO: 0.74 THOUSAND/ΜL (ref 0.17–1.22)
MONOCYTES NFR BLD AUTO: 7 % (ref 4–12)
NEUTROPHILS # BLD AUTO: 6.29 THOUSANDS/ΜL (ref 1.85–7.62)
NEUTS SEG NFR BLD AUTO: 62 % (ref 43–75)
NITRITE UR QL STRIP: NEGATIVE
NON-SQ EPI CELLS URNS QL MICRO: ABNORMAL /HPF
NRBC BLD AUTO-RTO: 0 /100 WBCS
O2 CT BLDV-SCNC: 17.3 ML/DL
O2 CT BLDV-SCNC: 17.4 ML/DL
OSMOLALITY UR: 708 MMOL/KG
PCO2 BLDV: 30.5 MM HG (ref 42–50)
PCO2 BLDV: 35.1 MM HG (ref 42–50)
PH BLDV: 7.37 [PH] (ref 7.3–7.4)
PH BLDV: 7.4 [PH] (ref 7.3–7.4)
PH UR STRIP.AUTO: 5.5 [PH] (ref 4.5–8)
PLATELET # BLD AUTO: 155 THOUSANDS/UL (ref 149–390)
PMV BLD AUTO: 12.8 FL (ref 8.9–12.7)
PO2 BLDV: 46.2 MM HG (ref 35–45)
PO2 BLDV: 89.6 MM HG (ref 35–45)
POTASSIUM SERPL-SCNC: 4.5 MMOL/L (ref 3.5–5.3)
POTASSIUM SERPL-SCNC: 5.1 MMOL/L (ref 3.5–5.3)
POTASSIUM UR-SCNC: 25.6 MMOL/L (ref 1–300)
PROT UR STRIP-MCNC: NEGATIVE MG/DL
RBC # BLD AUTO: 4.94 MILLION/UL (ref 3.88–5.62)
RBC #/AREA URNS AUTO: ABNORMAL /HPF
SODIUM SERPL-SCNC: 134 MMOL/L (ref 136–145)
SODIUM SERPL-SCNC: 135 MMOL/L (ref 136–145)
SP GR UR STRIP.AUTO: 1.01 (ref 1–1.03)
UROBILINOGEN UR QL STRIP.AUTO: 0.2 E.U./DL
UUN 24H UR-MCNC: 418 MG/DL
WBC # BLD AUTO: 10.23 THOUSAND/UL (ref 4.31–10.16)
WBC #/AREA URNS AUTO: ABNORMAL /HPF

## 2017-12-02 PROCEDURE — 82330 ASSAY OF CALCIUM: CPT | Performed by: NURSE PRACTITIONER

## 2017-12-02 PROCEDURE — 81001 URINALYSIS AUTO W/SCOPE: CPT | Performed by: NURSE PRACTITIONER

## 2017-12-02 PROCEDURE — 82948 REAGENT STRIP/BLOOD GLUCOSE: CPT

## 2017-12-02 PROCEDURE — C9113 INJ PANTOPRAZOLE SODIUM, VIA: HCPCS | Performed by: NURSE PRACTITIONER

## 2017-12-02 PROCEDURE — 83735 ASSAY OF MAGNESIUM: CPT | Performed by: NURSE PRACTITIONER

## 2017-12-02 PROCEDURE — 80048 BASIC METABOLIC PNL TOTAL CA: CPT | Performed by: NURSE PRACTITIONER

## 2017-12-02 PROCEDURE — 83036 HEMOGLOBIN GLYCOSYLATED A1C: CPT | Performed by: INTERNAL MEDICINE

## 2017-12-02 PROCEDURE — 85025 COMPLETE CBC W/AUTO DIFF WBC: CPT | Performed by: PHYSICIAN ASSISTANT

## 2017-12-02 PROCEDURE — 82805 BLOOD GASES W/O2 SATURATION: CPT | Performed by: NURSE PRACTITIONER

## 2017-12-02 PROCEDURE — 83519 RIA NONANTIBODY: CPT | Performed by: INTERNAL MEDICINE

## 2017-12-02 PROCEDURE — 86341 ISLET CELL ANTIBODY: CPT | Performed by: INTERNAL MEDICINE

## 2017-12-02 RX ORDER — MAGNESIUM SULFATE HEPTAHYDRATE 40 MG/ML
2 INJECTION, SOLUTION INTRAVENOUS ONCE
Status: COMPLETED | OUTPATIENT
Start: 2017-12-02 | End: 2017-12-02

## 2017-12-02 RX ADMIN — INSULIN LISPRO 2 UNITS: 100 INJECTION, SOLUTION INTRAVENOUS; SUBCUTANEOUS at 09:00

## 2017-12-02 RX ADMIN — HEPARIN SODIUM 5000 UNITS: 5000 INJECTION, SOLUTION INTRAVENOUS; SUBCUTANEOUS at 02:59

## 2017-12-02 RX ADMIN — CALCIUM GLUCONATE 1 G: 94 INJECTION, SOLUTION INTRAVENOUS at 07:46

## 2017-12-02 RX ADMIN — CHLORHEXIDINE GLUCONATE 15 ML: 1.2 RINSE ORAL at 21:03

## 2017-12-02 RX ADMIN — PANTOPRAZOLE SODIUM 40 MG: 40 INJECTION, POWDER, FOR SOLUTION INTRAVENOUS at 09:02

## 2017-12-02 RX ADMIN — MAGNESIUM SULFATE HEPTAHYDRATE 2 G: 40 INJECTION, SOLUTION INTRAVENOUS at 02:59

## 2017-12-02 RX ADMIN — INSULIN LISPRO 25 UNITS: 100 INJECTION, SOLUTION INTRAVENOUS; SUBCUTANEOUS at 12:43

## 2017-12-02 RX ADMIN — INSULIN LISPRO 4 UNITS: 100 INJECTION, SOLUTION INTRAVENOUS; SUBCUTANEOUS at 21:02

## 2017-12-02 RX ADMIN — INSULIN LISPRO 12 UNITS: 100 INJECTION, SOLUTION INTRAVENOUS; SUBCUTANEOUS at 09:00

## 2017-12-02 RX ADMIN — INSULIN LISPRO 8 UNITS: 100 INJECTION, SOLUTION INTRAVENOUS; SUBCUTANEOUS at 18:24

## 2017-12-02 RX ADMIN — INSULIN GLARGINE 45 UNITS: 100 INJECTION, SOLUTION SUBCUTANEOUS at 21:03

## 2017-12-02 RX ADMIN — INSULIN LISPRO 12 UNITS: 100 INJECTION, SOLUTION INTRAVENOUS; SUBCUTANEOUS at 12:41

## 2017-12-02 RX ADMIN — HEPARIN SODIUM 5000 UNITS: 5000 INJECTION, SOLUTION INTRAVENOUS; SUBCUTANEOUS at 11:59

## 2017-12-02 RX ADMIN — HEPARIN SODIUM 5000 UNITS: 5000 INJECTION, SOLUTION INTRAVENOUS; SUBCUTANEOUS at 19:36

## 2017-12-02 NOTE — PROGRESS NOTES
Transfer Note - ICU Transfer to SD/MS salvador Becerra 38 y o  male MRN: [id number]  [address] [facility]   Unit/Bed#:  Encounter: [id number]    Code Status: Level 1 - Full Code  POA:    POLST:      Reason for ICU adm:  Diabetic ketoacidosis    Active problems:   Principal Problem:    Diabetic ketoacidosis [address] [address] )  Active Problems:    Hyponatremia    Nausea    LIANG (acute kidney injury) ([facility] [address] )  Resolved Problems:    * No resolved hospital problems  *      Consultants:  Endocrinology    History of Present Illness:  51-year-old male with past history of hypertension who recently presented to an outside hospital with DKA which time he was diagnosed with diabetes type 1 but left AMA due to issues with staff  Patient was prescribed metformin, which he was unable to tolerate  He did not follow up with his primary care physician, but presented to this emergency department with nausea vomiting and dehydration  Summary of clinical course:  Patient admitted as above  Insulin administered gap closed  Patient tolerating p o  diet  Patient undecided as to whether not he wants to leave against medical advice  Please refer to multiple notes discussing same  Subcu insulin started  Recent or scheduled procedures:  None    Outstanding/pending diagnostics:  Anti islet antibodies    Cultures:  None       Mobilization Plan:  Out of bed to chair, right ear bekah    Nutrition Plan:   Tolerating p o  carb consistent diet    Discharge Plan:   Patient should be ready for discharge to home when medically stable, diabetic education has been provided    Initial Physical Therapy Recommendations:  Not applicable  Initial Occupational Therapy Recommendations:  Not applicable  Initial /Plan:  Pending     Specific Diagnosis Plan:  Hyperglycemia:  Converting from IV to subcutaneous insulin:  Completed, see orders    Need for Endocrine consult:  Yes, and progress    Spoke with Dr Lynda Haque regarding transfer  Please call critical Care Medicine with any questions or concerns  Portions of the record may have been created with voice recognition software  Occasional wrong word or "sound a like" substitutions may have occurred due to the inherent limitations of voice recognition software  Read the chart carefully and recognize, using context, where substitutions have occurred      AR Dalton

## 2017-12-02 NOTE — SOCIAL WORK
CM acknowledging consult for new onset diabetic  Pt already has a glucometer to check his blood sugars and states that he has no problem with his co-pay for rxs  [facility] but pt does not feel that will be needed at this time    CM will continue to follow

## 2017-12-02 NOTE — PROGRESS NOTES
Progress Note - Critical Care   Ramona Meléndez 38 y o  male MRN: [id number]  Unit/Bed#:  Encounter: [id number]      Assessment and Plan  Principal Problem:    Diabetic ketoacidosis ([facility] [address] )  Active Problems:    Hyponatremia    Nausea    LIANG (acute kidney injury) ([facility] [address] )  Resolved Problems:    * No resolved hospital problems  *      Neuro:  No issues  CV:  Hypertension on lisinopril hydrochlorothiazide at home  These were held due to in both hypotension and LIANG on arrival   Patient's blood pressure improved as well as kidney function  If continues could consider restarting  Continue on cardio/pulm monitoring  IV access:  Peripheral IVs      Pulm:  No issues  No pulmonary history  Encourage deep breathing/coughing/IS/PulmToileting   Supplemental O2 for saturations less than 92%      GI:  Nausea patient thought this might be due to metformin however difficult to say whether not this was due to that or DKA  :  LIANG no kidney history  Was likely pre renal due to DKA  Downtrending  Follow up a m  renal indices  Accurate I and Os  F/E/N:  Hyponatremia likely pseudohyponatremia due to hyperglycemia  Now within normal limits  Will discontinue serial electrolyte panel  Diet:  Diabetic clear liquids can advance as tolerated without IVF    Heme:    No issues  DVT Prophylaxis heparin subcutaneous  SCDs in place bilaterally     ID:  Likely reactive leukocytosis  No fevers  Follow up a m  labs    Endo:  DKA recent newly diagnosed type 2 diabetic  Recently stopped his medications due to side effects  Am in DKA  Was placed on DKA protocol yesterday  Anion gap closed  Transition to critical care insulin drip  Endocrine saw patient and recommended patient be placed on 45 units of Lantus at night with 12 units of Lantus with meals as well as sliding scale  Thus far patient's glucose has been within normal limits    Continue to check a c  and HS      MSK/Skin:  Out of bed as tolerated    Dispo: Downgrade to [facility] / Coordination of Care: Total Critical Care time spent 0 minutes excluding procedures, teaching and family updates  Chief Complaint: "I feel pretty well"    HPI/24hr events:   Patient admitted with DKA  Continue on DKA protocol anion gap closed at 14 30 check  Was converted to critical care insulin drip  Endocrine consultation gave recommendations of subQ regimen which is started  Otherwise no events  Vitals   Vitals:    17 1500 17 1600 17 1900 17 2300   BP: 120/58  134/77 115/68   Pulse: 85  97 79   Resp: (!) 24  12 13   Temp:  97 9 °F (36 6 °C) 98 3 °F (36 8 °C) 98 9 °F (37 2 °C)   TempSrc:  Oral Oral Oral   SpO2:   100% 100%   Weight:       Height:         Temp  Min: 97 7 °F (36 5 °C)  Max: 98 9 °F (37 2 °C)  IBW: 75 3 kg       Temp (24hrs), Av 2 °F (36 8 °C), Min:97 7 °F (36 5 °C), Max:98 9 °F (37 2 °C)    HR:  [] 79  Resp:  [12-24] 13  BP: ()/(47-89) 115/68  SpO2:  [96 %-100 %] 100 %      Hemodynamic Monitoring  N/A        Invasive/non-invasive ventilation settings   Respiratory    Lab Data (Last 4 hours)    None         O2/Vent Data (Last 4 hours)    None                Invasive  Devices  Invasive Devices     Peripheral Intravenous Line            Peripheral IV 17 Left Hand less than 1 day    Peripheral IV 17 Right Arm less than 1 day                  Physical Exam:  Physical Exam   Constitutional: He is oriented to person, place, and time  He appears well-developed and well-nourished  No distress  HENT:   Head: Normocephalic and atraumatic  Mouth/Throat: Oropharynx is clear and moist and mucous membranes are normal    Eyes: Conjunctivae and EOM are normal  Pupils are equal, round, and reactive to light  Neck: Trachea normal  No JVD present  No tracheal deviation present  Cardiovascular: Normal rate and regular rhythm  Exam reveals no gallop and no friction rub  No murmur heard    Pulmonary/Chest: Breath sounds normal  He has no wheezes  He has no rales  He exhibits no tenderness  Abdominal: Soft  He exhibits no distension  There is no tenderness  Genitourinary:   Genitourinary Comments: Deferred   Musculoskeletal: Normal range of motion  He exhibits no edema  Neurological: He is alert and oriented to person, place, and time  No cranial nerve deficit  Skin: Skin is warm and dry  Capillary refill takes less than 2 seconds  Nursing note and vitals reviewed  Laboratory and Diagnostics:    Results from last 7 days  Lab Units 12/01/17  0456 11/26/17  0024   WBC Thousand/uL 13 82* 10 75*   HEMOGLOBIN g/dL 15 3 15 1   HEMATOCRIT % 44 9 44 5   PLATELETS Thousands/uL 202 155   NEUTROS PCT % 74 61   MONOS PCT % 6 6       Results from last 7 days  Lab Units 12/02/17 0055 12/01/17 1717 12/01/17  1430  12/01/17  0456   SODIUM mmol/L 135* 133* 135*  < > 125*   POTASSIUM mmol/L 4 5 4 0 3 5  < > 4 6   CHLORIDE mmol/L 104 98* 101  < > 86*   CO2 mmol/L 21 23 24  < > 14*   BUN mg/dL 26* 31* 32*  < > 41*   CREATININE mg/dL 1 79* 2 03* 1 82*  < > 2 14*   CALCIUM mg/dL 7 8* 8 3 8 6  < > 10 4*   TOTAL PROTEIN g/dL  --   --   --   --  8 5*   BILIRUBIN TOTAL mg/dL  --   --   --   --  0 80   ALK PHOS U/L  --   --   --   --  73   ALT U/L  --   --   --   --  55   AST U/L  --   --   --   --  22   GLUCOSE RANDOM mg/dL 149* 267* 138  < > 495*   < > = values in this interval not displayed      Results from last 7 days  Lab Units 12/02/17  0055 12/01/17  1717 12/01/17  1430   MAGNESIUM mg/dL 1 9 2 2 2 6     No results found for: PHOS       No components found for: ABG  No components found for: TROPONIN  No results found for: LACTICACID  ABG:No results found for: PHART, OVD4LSZ, PO2ART, QGM8RHK, T8ENDDLP, BEART, SOURCE    Micro:  Blood Culture: No results found for: BLOODCX  Urine Culture: No results found for: URINECX  Sputum Culture: No components found for: SPUTUMCX  Wound Culure: No results found for: WOUNDCULT    Imaging: I have personally reviewed pertinent films in PACS    I/O   I/O       11/30 0701 - 12/01 0700 12/01 0701 - 12/02 0700    P  O   1300    I V  (mL/kg)  2252 5 (19 4)    IV Piggyback  25    Total Intake(mL/kg)  3577 5 (30 8)    Urine (mL/kg/hr)  550 (0 2)    Total Output   550    Net   +3027 5                Intake/Output Summary (Last 24 hours) at 12/02/17 0151  Last data filed at 12/01/17 1900   Gross per 24 hour   Intake          3577 45 ml   Output              550 ml   Net          3027 45 ml       Active medications  Scheduled Meds:  chlorhexidine 15 mL Swish & Spit Q12H [address]   heparin (porcine) 5,000 Units Subcutaneous Q8H [address]   insulin glargine 45 Units Subcutaneous HS   insulin lispro 1-6 Units Subcutaneous HS   insulin lispro 12 Units Subcutaneous TID With Meals   insulin lispro 2-12 Units Subcutaneous TID With Meals   magnesium sulfate 2 g Intravenous Once   pantoprazole 40 mg Intravenous Q24H [address]     Continuous Infusions:     PRN Meds:     aluminum-magnesium hydroxide-simethicone    ondansetron    Diet       Diet Orders            Start     Ordered    12/01/17 2339  Diet Jem/CHO Controlled; Diabetic Clear Liquid  Diet effective now     Question Answer Comment   Diet Type Jem/CHO Controlled    Jem/CHO Controlled Diabetic Clear Liquid    RD to adjust diet per protocol? No        12/01/17 2338            VTE Pharmacologic Prophylaxis: Heparin  VTE Mechanical Prophylaxis: sequential compression device      Code Status: Level 1 - Full Code  Advance Directive and Living Will:      Power of :    POLST:      Portions of the record may have been created with voice recognition software  Occasional wrong word or "sound a like" substitutions may have occurred due to the inherent limitations of voice recognition software  Read the chart carefully and recognize, using context, where substitutions have occurred      Bebe Ross PA-C

## 2017-12-02 NOTE — PROGRESS NOTES
Pt found taking off tele leads to leave AMA  Wife and patient arguing in the room about leaving  RN told wife that if she continued to agitate patient, she would be asked to leave  AR Loomis explained to patient and the wife about the risks of diabetes, new onset diabetes, different types of management and that we are actively working on a diabetic regimen  Pt stated multiple times that, "he gives us 24 hours" and that he feels we "are not doing anything to treat him"  Pt educated on risks of leaving the hospital and the risks of new onset diabetes

## 2017-12-02 NOTE — PLAN OF CARE
Problem: DISCHARGE PLANNING - CARE MANAGEMENT  Goal: Discharge to post-acute care or home with appropriate resources  INTERVENTIONS:  - Conduct assessment to determine patient/family and health care team treatment goals, and need for post-acute services based on payer coverage, community resources, and patient preferences, and barriers to discharge  - Address psychosocial, clinical, and financial barriers to discharge as identified in assessment in conjunction with the patient/family and health care team  - Arrange appropriate level of post-acute services according to patient's   needs and preference and payer coverage in collaboration with the physician and health care team  - Communicate with and update the patient/family, physician, and health care team regarding progress on the discharge plan  - Arrange appropriate transportation to post-acute venues   Outcome: Progressing  CM acknowledging consult for new onset diabetic  Pt already has a glucometer to check his blood sugars and states that he has no problem with his co-pay for rxs  CM offered [facility] services, but pt does not feel that will be needed at this time    CM will continue to follow

## 2017-12-02 NOTE — PROGRESS NOTES
Called to room by primary RN because patient wanted to sign out AMA  Patient states that he feels "stuck" here and we "are not doing anything" for him  Explained to patient that he was critically ill on admission and that we are monitoring his blood sugar levels so that we can discharge him home with an appropriate medication regimen for glycemic control  Explained to patient that he is still having hyperglycemia and that we need to continue to monitor and follow up on endocrinology recommendations as well as labwork in the morning secondary to ongoing LIANG  Explained that patient also needs proper education on how to administer insulin at home as this is new to him  The patient stated that he wants to go home on prior regimen and stated that the only reason "my sugars are high is because I'm stressed out being here " Explained to patient that he was intolerant to metformin so if he were to leave, he would be leaving on less of a medication regimen that he was when he was admitted which did not properly control his diabetes  Discussed with patient and wife the risks of leaving AM, including death, permanent disability, loss of limbs and blindness  Patient agreeable to stay overnight for sugar monitoring, follow up lab work and endocrinology recommendations at this time  Dr Cheryl Goode notified of above

## 2017-12-02 NOTE — SIGNIFICANT EVENT
Interval Progress Note - Critical Care  Nirav Canales 38 y o  male MRN: [id number]    [address] ICU Room / Bed: / Encounter: [id number]    Called to see patient regarding leaving against medical advice  Patient was found taking off his telemetry leads and stating that he does not wish to stay  I had spoken with this patient earlier today regarding our plan to discharge him on subcutaneous insulin due to a 2nd recent admission for diabetic ketoacidosis  Patient stated that he wishes to take pills only for his diabetes, and detailed explanation regarding the differences between type 1 and type 2 diabetes commenced  At that time, patient stated understanding  Patient's wife has arrived, and she is blocking his egress from his room  They are arguing regarding him leaving against medical advice once again  Patient makes poor eye contact with me at this point and states that he wishes to leave the hospital, because we are not doing anything to treat him  I advised him that we are assessing his insulin needs during a stable state to determine what he will need to be discharged upon  I also reminded him that there is a significant amount of education at must take her for him to properly use a glucometer and learn how to inject himself with insulin  Patient minimizes this education stating that he does not feel he needs 2 days to learn how to do this    Patient states to me that he wishes to leave  I asked him what his plan is for his diabetic care, to which he replies he does not have a plan  I advised him again of the dangers of DKA, death, cerebral edema, erectile dysfunction, limb amputation, permanent disability, blindness, and nephropathy with potential for lifelong need for hemodialysis if his diabetes continues to go untreated    The patient is complaining that he is not getting any sleep in the hospital because we continuously come into measures glucose and stick him with needles  I advised him that this is necessary to determine his insulin needs and to properly treat his diabetes  Patient states understanding of the things I have told him, and he will be discussing his choice to leave her stay with his wife  Of note, his wife was also advised that they are no longer allowed to argue in the room as it is disruptive to the intensive care unit  Both the patient and the wife were notified that if the arguing were to continue, security will be escorting the wife from the building  Dr Karan Oleary notified of the above  Patient agrees to stay at present  Portions of the record may have been created with voice recognition software  Occasional wrong word or "sound a like" substitutions may have occurred due to the inherent limitations of voice recognition software  Read the chart carefully and recognize, using context, where substitutions have occurred       AR Russell

## 2017-12-03 VITALS
SYSTOLIC BLOOD PRESSURE: 137 MMHG | DIASTOLIC BLOOD PRESSURE: 87 MMHG | BODY MASS INDEX: 36.54 KG/M2 | OXYGEN SATURATION: 98 % | HEART RATE: 104 BPM | HEIGHT: 71 IN | RESPIRATION RATE: 18 BRPM | WEIGHT: 261.02 LBS | TEMPERATURE: 98.1 F

## 2017-12-03 PROBLEM — R11.0 NAUSEA: Status: RESOLVED | Noted: 2017-12-02 | Resolved: 2017-12-03

## 2017-12-03 PROBLEM — E11.10 DIABETIC KETOACIDOSIS (HCC): Status: RESOLVED | Noted: 2017-12-02 | Resolved: 2017-12-03

## 2017-12-03 PROBLEM — E11.9 DIABETES (HCC): Chronic | Status: ACTIVE | Noted: 2017-12-03

## 2017-12-03 PROBLEM — E87.1 HYPONATREMIA: Status: RESOLVED | Noted: 2017-12-02 | Resolved: 2017-12-03

## 2017-12-03 LAB
ANION GAP SERPL CALCULATED.3IONS-SCNC: 10 MMOL/L (ref 4–13)
BASOPHILS # BLD AUTO: 0.07 THOUSANDS/ΜL (ref 0–0.1)
BASOPHILS NFR BLD AUTO: 1 % (ref 0–1)
BUN SERPL-MCNC: 21 MG/DL (ref 5–25)
CALCIUM SERPL-MCNC: 9.2 MG/DL (ref 8.3–10.1)
CHLORIDE SERPL-SCNC: 103 MMOL/L (ref 100–108)
CO2 SERPL-SCNC: 24 MMOL/L (ref 21–32)
CREAT SERPL-MCNC: 1.46 MG/DL (ref 0.6–1.3)
EOSINOPHIL # BLD AUTO: 0.18 THOUSAND/ΜL (ref 0–0.61)
EOSINOPHIL NFR BLD AUTO: 2 % (ref 0–6)
ERYTHROCYTE [DISTWIDTH] IN BLOOD BY AUTOMATED COUNT: 11.9 % (ref 11.6–15.1)
GFR SERPL CREATININE-BSD FRML MDRD: 69 ML/MIN/1.73SQ M
GLUCOSE SERPL-MCNC: 150 MG/DL (ref 65–140)
GLUCOSE SERPL-MCNC: 168 MG/DL (ref 65–140)
GLUCOSE SERPL-MCNC: 171 MG/DL (ref 65–140)
HCT VFR BLD AUTO: 39.6 % (ref 36.5–49.3)
HGB BLD-MCNC: 13.5 G/DL (ref 12–17)
LYMPHOCYTES # BLD AUTO: 3.37 THOUSANDS/ΜL (ref 0.6–4.47)
LYMPHOCYTES NFR BLD AUTO: 39 % (ref 14–44)
MAGNESIUM SERPL-MCNC: 2 MG/DL (ref 1.6–2.6)
MCH RBC QN AUTO: 27.4 PG (ref 26.8–34.3)
MCHC RBC AUTO-ENTMCNC: 34.1 G/DL (ref 31.4–37.4)
MCV RBC AUTO: 80 FL (ref 82–98)
MONOCYTES # BLD AUTO: 0.64 THOUSAND/ΜL (ref 0.17–1.22)
MONOCYTES NFR BLD AUTO: 7 % (ref 4–12)
NEUTROPHILS # BLD AUTO: 4.46 THOUSANDS/ΜL (ref 1.85–7.62)
NEUTS SEG NFR BLD AUTO: 51 % (ref 43–75)
NRBC BLD AUTO-RTO: 0 /100 WBCS
PHOSPHATE SERPL-MCNC: 2.8 MG/DL (ref 2.7–4.5)
PLATELET # BLD AUTO: 147 THOUSANDS/UL (ref 149–390)
PMV BLD AUTO: 12.8 FL (ref 8.9–12.7)
POTASSIUM SERPL-SCNC: 3.5 MMOL/L (ref 3.5–5.3)
RBC # BLD AUTO: 4.93 MILLION/UL (ref 3.88–5.62)
SODIUM SERPL-SCNC: 137 MMOL/L (ref 136–145)
WBC # BLD AUTO: 8.74 THOUSAND/UL (ref 4.31–10.16)

## 2017-12-03 PROCEDURE — 85025 COMPLETE CBC W/AUTO DIFF WBC: CPT | Performed by: NURSE PRACTITIONER

## 2017-12-03 PROCEDURE — 83735 ASSAY OF MAGNESIUM: CPT | Performed by: NURSE PRACTITIONER

## 2017-12-03 PROCEDURE — 80048 BASIC METABOLIC PNL TOTAL CA: CPT | Performed by: NURSE PRACTITIONER

## 2017-12-03 PROCEDURE — 84100 ASSAY OF PHOSPHORUS: CPT | Performed by: NURSE PRACTITIONER

## 2017-12-03 PROCEDURE — 82948 REAGENT STRIP/BLOOD GLUCOSE: CPT

## 2017-12-03 RX ORDER — BLOOD SUGAR DIAGNOSTIC
STRIP MISCELLANEOUS 4 TIMES DAILY
Qty: 100 EACH | Refills: 0 | Status: SHIPPED | OUTPATIENT
Start: 2017-12-03 | End: 2017-12-03 | Stop reason: HOSPADM

## 2017-12-03 RX ORDER — INSULIN GLARGINE 100 [IU]/ML
45 INJECTION, SOLUTION SUBCUTANEOUS
Qty: 10 ML | Refills: 0 | Status: SHIPPED | OUTPATIENT
Start: 2017-12-03 | End: 2017-12-03 | Stop reason: HOSPADM

## 2017-12-03 RX ADMIN — INSULIN LISPRO 12 UNITS: 100 INJECTION, SOLUTION INTRAVENOUS; SUBCUTANEOUS at 09:14

## 2017-12-03 RX ADMIN — INSULIN LISPRO 5 UNITS: 100 INJECTION, SOLUTION INTRAVENOUS; SUBCUTANEOUS at 09:14

## 2017-12-03 NOTE — PLAN OF CARE
Problem: Prexisting or High Potential for Compromised Skin Integrity  Goal: Skin integrity is maintained or improved  INTERVENTIONS:  - Identify patients at risk for skin breakdown  - Assess and monitor skin integrity  - Assess and monitor nutrition and hydration status  - Monitor labs (i e  albumin)  - Assess for incontinence   - Turn and reposition patient  - Assist with mobility/ambulation  - Relieve pressure over bony prominences  - Avoid friction and shearing  - Provide appropriate hygiene as needed including keeping skin clean and dry  - Evaluate need for skin moisturizer/barrier cream  - Collaborate with interdisciplinary team (i e  Nutrition, Rehabilitation, etc )   - Patient/family teaching   Outcome: Progressing      Problem: Nutrition/Hydration-ADULT  Goal: Nutrient/Hydration intake appropriate for improving, restoring or maintaining nutritional needs  Monitor and assess patient's nutrition/hydration status for malnutrition (ex- brittle hair, bruises, dry skin, pale skin and conjunctiva, muscle wasting, smooth red tongue, and disorientation)  Collaborate with interdisciplinary team and initiate plan and interventions as ordered  Monitor patient's weight and dietary intake as ordered or per policy  Utilize nutrition screening tool and intervene per policy  Determine patient's food preferences and provide high-protein, high-caloric foods as appropriate       INTERVENTIONS:  - Monitor oral intake, urinary output, labs, and treatment plans  - Assess nutrition and hydration status and recommend course of action  - Evaluate amount of meals eaten  - Assist patient with eating if necessary   - Allow adequate time for meals  - Recommend/ encourage appropriate diets, oral nutritional supplements, and vitamin/mineral supplements  - Order, calculate, and assess calorie counts as needed  - Recommend, monitor, and adjust tube feedings and TPN/PPN based on assessed needs  - Assess need for intravenous fluids  - Provide specific nutrition/hydration education as appropriate  - Include patient/family/caregiver in decisions related to nutrition   Outcome: Progressing      Problem: DISCHARGE PLANNING - CARE MANAGEMENT  Goal: Discharge to post-acute care or home with appropriate resources  INTERVENTIONS:  - Conduct assessment to determine patient/family and health care team treatment goals, and need for post-acute services based on payer coverage, community resources, and patient preferences, and barriers to discharge  - Address psychosocial, clinical, and financial barriers to discharge as identified in assessment in conjunction with the patient/family and health care team  - Arrange appropriate level of post-acute services according to patient's   needs and preference and payer coverage in collaboration with the physician and health care team  - Communicate with and update the patient/family, physician, and health care team regarding progress on the discharge plan  - Arrange appropriate transportation to post-acute venues   Outcome: Progressing

## 2017-12-03 NOTE — DISCHARGE INSTRUCTIONS
Please check your blood sugars and keep a log to take to the endocrinologist including dates and times of blood sugars  Monitor for signs of hypo and hyper glycemia as discussed during your discharge process  Please have lab work done in 3 days  The results will go to your PCP  You will need to follow up  Diabetic Hyperglycemia   WHAT YOU NEED TO KNOW:   Diabetic hyperglycemia is a blood glucose (sugar) level that is higher than your healthcare provider recommends  You may have increased thirst and urinate more often than usual  Over time, uncontrolled diabetes can damage your nerves, blood vessels, tissues, and organs  That is why it is important to manage diabetic hyperglycemia  Without treatment, diabetic hyperglycemia can lead to diabetic ketoacidosis (DKA) or hyperglycemic hyperosmolar state (HHS)  These are serious conditions that can become life-threatening  DISCHARGE INSTRUCTIONS:   Seek care immediately if:   You have shortness of breath  Your breath smells fruity  You have nausea and vomiting  You have symptoms of dehydration, such as dark yellow urine, dry mouth and lips, and dry skin  Contact your healthcare provider if:   You continue to have higher blood sugar levels than your healthcare provider recommends  Your blood sugar level is over 240 mg/dl and  you have ketones in your urine  You have questions or concerns about your condition or care  Medicines:   Medicines  such as insulin and hypoglycemic medicine decrease blood sugar levels  Take your medicine as directed  Contact your healthcare provider if you think your medicine is not helping or if you have side effects  Tell him or her if you are allergic to any medicine  Keep a list of the medicines, vitamins, and herbs you take  Include the amounts, and when and why you take them  Bring the list or the pill bottles to follow-up visits  Carry your medicine list with you in case of an emergency    Follow up with your healthcare provider or specialist as directed: Your healthcare provider may refer you to a dietitian or diabetes specialist  Write down your questions so you remember to ask them during your visits  Manage diabetic hyperglycemia:   If you take diabetes medicine or insulin, take it as directed  Missed or wrong doses can cause your blood sugar to go up  Tell your healthcare provider if you continue to have trouble managing your blood sugar  He may change the type, amount, or timing of your diabetes medicine or insulin  If you do not take diabetes medicine or insulin, you may need to start  Work with your healthcare provider to develop a sick day plan  Illness can cause your blood sugar to rise  A sick day plan helps you control your blood sugar level when you are sick  Prevent diabetic hyperglycemia:   Check your blood sugar levels regularly  Ask your healthcare provider how often to check your blood sugar and what your levels should be  Follow your meal plan  Your blood sugar can go up if you eat a large meal or you eat more carbohydrates than recommended  Work with a dietitian to develop a meal plan that is right for you  Exercise regularly  to help lower your blood sugar when it is high  It can also keep your blood sugar levels steady over time  Exercise for at least 30 minutes, 5 days a week  Include muscle strengthening activities 2 days each week  Do not sit for longer than 90 minutes at a time  Work with your healthcare provider to create an exercise plan  Children should get at least 60 minutes of physical activity each day  Check your ketones before exercise  if your blood sugar level is above 240 mg/dl  Do not exercise if you have ketones in your urine,  because your blood sugar level may rise even more  Ask your healthcare provider how to lower your blood sugar when you have ketones    © 2017 [employer] Information is for End User's use only and may not be sold, redistributed or otherwise used for commercial purposes  All illustrations and images included in CareNotes® are the copyrighted property of [employer]  or [employer]  The above information is an  only  It is not intended as medical advice for individual conditions or treatments  Talk to your doctor, nurse or pharmacist before following any medical regimen to see if it is safe and effective for you  ·   ·   Hypoglycemia in a Person with Diabetes   WHAT YOU NEED TO KNOW:   Hypoglycemia is a serious condition that happens when your blood glucose (sugar) level drops too low  The blood sugar level is usually too high in a person with diabetes, but the level can also drop too low  It is important to follow your diabetes management plan to keep your blood sugar level steady  DISCHARGE INSTRUCTIONS:   Call 911 for any of the following: You have a seizure or pass out  You feel you are going to pass out  You have trouble thinking clearly  Seek care immediately if:  Your blood sugar is less than 50 mg/dL and does not respond to treatment  Contact your healthcare provider if:   You have had symptoms of low blood sugar several times  You have questions about the amount of insulin or diabetes medicine you are taking  You have questions or concerns about your condition or care  Medicines:   Insulin or diabetes medicine  help to keep your blood sugar under control  Glucagon  may be needed if you have severe hypoglycemia  Take your medicine as directed  Contact your healthcare provider if you think your medicine is not helping or if you have side effects  Tell him or her if you are allergic to any medicine  Keep a list of the medicines, vitamins, and herbs you take  Include the amounts, and when and why you take them  Bring the list or the pill bottles to follow-up visits  Carry your medicine list with you in case of an emergency    Follow up with your healthcare provider as directed: You may need dose changes to your insulin or oral diabetes medicine if you have hypoglycemia  Write down your questions so you remember to ask them during your visits  Manage hypoglycemia:   Check your blood sugar level right away if you have symptoms of hypoglycemia  Hypoglycemia is usually 70 mg/dL or below  Ask your healthcare provider what blood sugar level is too low for you  If your blood sugar level is too low, eat or drink 15 grams of fast-acting carbohydrate  Examples of this amount of fast-acting carbohydrate are 4 ounces (½ cup) of fruit juice or 4 ounces of regular soda  Other examples are 2 tablespoons of raisins or 3 to 4 glucose tablets  Check your blood sugar level 15 minutes later  If the level is still low (less than 100 mg/dL), have another 15 grams of carbohydrate  When the level returns to 100 mg/dL, eat a snack or meal that contains carbohydrates  This will help prevent another drop in blood sugar  Always carefully follow your healthcare provider's instructions on how to treat low blood sugar levels  Always carry a source of fast-acting carbohydrate  If you have symptoms of hypoglycemia and you do not have a blood glucose meter, have a source of fast-acting carbohydrate anyway  Avoid carbohydrate foods that are high in fat  The fat content may make it take longer to increase your blood sugar level  Ask your healthcare provider if you should carry a glucagon kit  Glucagon is a medicine that is injected when you develop severe hypoglycemia and become unconscious  Check the expiration date every month and replace it before it expires  Teach others how to help you if you have symptoms of hypoglycemia  Tell them about the symptoms of hypoglycemia  Ask them to give you a source of fast-acting carbohydrate if you cannot get it yourself   Ask them to give you a glucagon injection if you have symptoms of hypoglycemia and you become unconscious or have a seizure  Ask them to call 911   This is an emergency  Tell them never to try to make you swallow anything if you faint or have a seizure  Wear medical alert jewelry  or carry a card that says you have diabetes  Ask where to get these items  Prevent hypoglycemia:   Take diabetes medicine as directed  Take your medicine at the right time and in the right amount  Your healthcare provider may change your blood sugar goals if you get hypoglycemia often  Eat regular meals and snacks  Talk to your dietitian or healthcare provider about a meal plan that is right for you  Do not skip meals  Check your blood sugar level as directed  Ask your healthcare provider what your blood sugar levels should be before and after you eat  Ask when and how often to check your blood sugar level  You may need to check at least 3 times each day  Record your blood sugar level results and take the record with you when you see your healthcare provider  Your provider may use the record to make changes to your medicine, food, or exercise schedules  Check your blood sugar level before you exercise  Exercise can decrease your blood sugar level  If your blood sugar level is less than 100 mg/dL, have a carbohydrate snack  Examples are 4 to 6 crackers, ½ banana, 8 ounces (1 cup) of nonfat or 1% milk, or 4 ounces (½ cup) of juice  If you will exercise for more than 1 hour, you may need to check your blood sugar level every 30 minutes  Your healthcare provider may also recommend that you check your blood sugar level after exercise  Be aware of how alcohol affects your blood sugar level  Alcohol can cause your blood sugar level to drop for up to 12 hours after drinking  Ask your healthcare provider if alcohol is safe for you  If you drink alcohol, always have a snack or meal at the same time  Women should limit alcohol to 1 drink a day  Men should limit alcohol to 2 drinks a day   A drink of alcohol is 12 ounces of beer, 5 ounces of wine, or 1½ ounces of liquor  © 2017 [employer] Information is for End User's use only and may not be sold, redistributed or otherwise used for commercial purposes  All illustrations and images included in CareNotes® are the copyrighted property of [employer]  or [employer]  The above information is an  only  It is not intended as medical advice for individual conditions or treatments  Talk to your doctor, nurse or pharmacist before following any medical regimen to see if it is safe and effective for you  ·   ·   How to Check Your Blood Sugar   WHAT YOU NEED TO KNOW:   High blood sugar levels increase your risk for heart attack, stroke, eye problems, and kidney problems  You can decrease your risk by controlling your blood sugar levels  Low blood sugar levels can also lead to serious health problems and must be treated right away  Check your blood sugar to help you learn how food, exercise, stress, and medicines affect your levels  Keep a record of your blood sugar levels  It can be used to adjust your meal plan, exercise routine, insulin doses, or diabetes medicine if needed  DISCHARGE INSTRUCTIONS:   How to check your blood sugar level:   Check your blood sugar level with a glucose meter  This device uses a small drop of blood to measure your blood sugar level  Some glucose meters measure a drop of blood taken from your finger using a special lancet device  Other meters will also measure a drop of blood taken from your thigh, forearm, or the palm of your hand  Blood sugar levels change quickly after meals, after you take insulin, during exercise, and when you feel stressed or ill  It is best to use blood from your finger to check your blood sugar level during these times  Your healthcare provider will teach you how to use a glucose meter to check your blood sugar level   Ask your healthcare provider for more information about taking blood samples from areas other than your finger  When and how often to check your blood sugar level:  Ask your healthcare provider when and how often you should check your blood sugar levels  If you check your blood sugar level before a meal , it should be between 80 and 130 mg/dL  If you check your blood sugar level 2 hours after a meal , it should be less than 180 mg/dL  Ask your healthcare provider if these are good goals for you  Blood sugar levels need to be checked more often when you are sick, there is a change to your medicine, or if you change your daily routine  Test your blood sugar level if you feel like it may be too high (hyperglycemia) or too low (hypoglycemia)  Keep a record of your blood sugar levels:  Write down your blood sugar level each time you test it  Write down the date, the time of the test (including if it was before or after a meal), and the result  Write down the time you took your insulin or diabetes pills  Record the type and amount of insulin or diabetes medicine you took  Write comments about anything that may have made your blood sugar level go up or down  Your blood sugar level can be affected by exercise, eating more or less than usual, or stress  Bring this record with you to your follow-up visits  How to care for your glucose meter and test strips:  Ask your healthcare provider how and how often to check the accuracy of your meter  You may need to do the following:  Store your equipment properly  Keep the test strips away from heat, cold, and moisture  Do not take a test strip out of the container until you are ready to use it  Put the lid back tightly on the container  Do not use test strips that are damaged, wet, or bent  Check the expiration date  on the test strip container to be sure the test strips have not   Your blood sugar readings may be wrong if you use  test strips  Use only the type of glucose test strips that work with your glucose meter    Wear medical alert identification:  Wear medical alert jewelry or carry a card that says you have diabetes  Ask your healthcare provider where to get these items  Follow up with your healthcare provider as directed:  Write down your questions so you remember to ask them during your visits  © 2017 [address] Information is for End User's use only and may not be sold, redistributed or otherwise used for commercial purposes  All illustrations and images included in CareNotes® are the copyrighted property of [employer]  or [address]  The above information is an  only  It is not intended as medical advice for individual conditions or treatments  Talk to your doctor, nurse or pharmacist before following any medical regimen to see if it is safe and effective for you    ·   ·   ·

## 2017-12-03 NOTE — SOCIAL WORK
CM received a response from [facility] in regards to pt's Rx  Pt will have a small co-pay for Alcohol Swabs and the remainder of discharge medication will be covered under his insurance  CM discussed results with SLIM and pt  Pt has no other needs at this time  CM department to follow pt through discharge

## 2017-12-03 NOTE — PLAN OF CARE
Problem: DISCHARGE PLANNING - CARE MANAGEMENT  Goal: Discharge to post-acute care or home with appropriate resources  INTERVENTIONS:  - Conduct assessment to determine patient/family and health care team treatment goals, and need for post-acute services based on payer coverage, community resources, and patient preferences, and barriers to discharge  - Address psychosocial, clinical, and financial barriers to discharge as identified in assessment in conjunction with the patient/family and health care team  - Arrange appropriate level of post-acute services according to patient's   needs and preference and payer coverage in collaboration with the physician and health care team  - Communicate with and update the patient/family, physician, and health care team regarding progress on the discharge plan  - Arrange appropriate transportation to post-acute venues   Outcome: Progressing  LOS: 2 CM faxed pt's script to [facility] [phone]  CM contacted [facility] to inform that a fax was sent for Rx price  Pt has no other needs at this time

## 2017-12-03 NOTE — DISCHARGE SUMMARY
Discharge Summary - [address] [facility]    Patient Information: Danette Fulton 38 y o  male MRN: [id number]  Unit/Bed#: -01 Encounter: [id number]    Discharging Physician / Practitioner: Traci Freeman  PCP: No primary care provider on file  Admission Date: 12/1/2017  Discharge Date: 12/03/17    Reason for Admission: Diabetic Ketoacidosis    Diagnosis at D/C:  Likely type 2 diabetes however antibodies pending to r/o type 1  Discharge Diagnoses:     Principal Problem:    Diabetic ketoacidosis ([facility] )  Active Problems:    Hyponatremia    Nausea    LIANG (acute kidney injury) ([facility] )  Resolved Problems:    * No resolved hospital problems  *      Consultations During Hospital Stay:  · Endocrinology  · Dietician    Procedures Performed:     · None    Significant Findings / Test Results:     · CXR No active pulmonary disease  · ABG pH 7 286/hco3 7 1/anion gap 15  · UA +ketones/glucose  · Glucose on admission 495    Incidental Findings:   · None    Test Results Pending at Discharge (will require follow up):   · Iselt cell antibodies     Outpatient Tests Requested:  · BMP in 3 days with results to PCP  · Recommend follow up with Endocrinologist as well as PCP and other scheduled appointments as previously scheduled in [place] (cardiology, neurology, opthamology)     Complications:  None    Hospital Course:     Danette Fulton is a 38 y o  male patient with a PMH of HTN and recently diagnosed DM who originally presented to the hospital on 12/1/2017 due to elevated blood sugar and persistent nausea and vomiting  The patient was recently admitted for DKA at [facility] for DKA but left AMA  At discharge he was given a script for metformin and believes the nausea and vomiting was as a result of this medication  He was also discharged on SQ levimir however he was not able to inject this because he was so sick per the patient   He began having abdominal pain and felt very tired and weak which prompted him to come to the ED  On admission he was noted to be in DKA and was admitted to the step down unit for further management  He was treated with an insulin gtt and endocrinology was consulted  He was transferred out of the critical care area once the anion gap closed and the patient was able to transition to SQ insulin  The patient wanted to leave again AMA several times during his stay however after explaining the dangers to him and his wife he was agreeable to stay  On day of discharge the patient is stable  Discussed with Dr Clare Rice from endocrinology the appropriate regimen for the patient to be discharged home on  The patient will need to follow up with endocrinology in 1-2 weeks for follow up of his islet cell antibody testing as well as follow up as insulin will be new to this patient  He sees a PCP in [place], he will need to have a BMP in 3 days with results to his PCP as his kidney function remains elevated  This has been trending down  Unsure of patients baseline but I suspect that the patient has a baseline elevated creatinine given his history of HTN and undiagnosed DM  On day of discharge his creatinine was 1 46  Discussed this in depth with the patient and his wife  Denies any n/v/d or abdominal pain on day of discharge  Blood sugars are much better controlled  He did have a blood sugar of 62 this mroning however Dr Clare Rice believes this is a result of correctional insulin in which he will not be going home on  He was also instructed to keep a log with all blood sugars including dates and time to bring to the endocrinologist   The patient was also given an [employer] card so that he can call and establish and transfer care with a PCP in this area as he no longer lives in [facility]        Medication Changes at Discharge:    STOP Metformin    ADD Levirmir 45 IU SQ at bedtime (Lantus was not covered by patients insurance)  ADD Humalog 12 IU SQ with meals           Condition at Discharge: stable     Discharge Day Visit / Exam:     Subjective:  Patient feels "back to normal "  Wants to go home  Denies any polyuria, polydipsia, n/v/d or abdominal pain  Vitals: Blood Pressure: 137/87 (12/03/17 0700)  Pulse: 104 (12/03/17 0700)  Temperature: 98 1 °F (36 7 °C) (12/03/17 0700)  Temp Source: Oral (12/03/17 0700)  Respirations: 18 (12/03/17 0700)  Height: 5' 11" (180 3 cm) (12/01/17 0800)  Weight - Scale: 118 kg (261 lb 0 4 oz) (12/02/17 0600)  SpO2: 98 % (12/03/17 0700)  Exam:   Physical Exam   Constitutional: He is oriented to person, place, and time  He appears well-developed and well-nourished  HENT:   Head: Normocephalic and atraumatic  Eyes: Conjunctivae are normal  Pupils are equal, round, and reactive to light  Neck: Normal range of motion  Neck supple  No JVD present  No tracheal deviation present  No thyromegaly present  Cardiovascular: Normal rate, regular rhythm, normal heart sounds and intact distal pulses  No murmur heard  Pulmonary/Chest: Effort normal and breath sounds normal    Abdominal: Soft  Bowel sounds are normal  He exhibits no distension and no mass  There is no tenderness  Musculoskeletal: Normal range of motion  Neurological: He is alert and oriented to person, place, and time  Skin: Skin is warm  Psychiatric: He has a normal mood and affect  Nursing note and vitals reviewed  Discussion with Family: Extensive conversation with patient and wife  Discharge instructions/Information to patient and family:   See after visit summary for information provided to patient and family  Provisions for Follow-Up Care:  See after visit summary for information related to follow-up care and any pertinent home health orders  Disposition:     Home    For Discharges to [facility] SNF:   · Not Applicable to this Patient - Not Applicable to this Patient    Planned Readmission: No     Discharge Statement:  I spent 90 minutes discharging the patient  This time was spent on the day of discharge  I had direct contact with the patient on the day of discharge  Greater than 50% of the total time was spent examining patient, answering all patient questions, arranging and discussing plan of care with patient as well as directly providing post-discharge instructions  Additional time then spent on discharge activities  Discharge Medications:  See after visit summary for reconciled discharge medications provided to patient and family        ** Please Note: This note has been constructed using a voice recognition system **

## 2017-12-03 NOTE — SOCIAL WORK
LOS: 2 CM faxed pt's script to [facility] [phone]  CM contacted [facility] to inform that a fax was sent for Rx price  Pt has no other needs at this time

## 2017-12-03 NOTE — PLAN OF CARE
Problem: DISCHARGE PLANNING - CARE MANAGEMENT  Goal: Discharge to post-acute care or home with appropriate resources  INTERVENTIONS:  - Conduct assessment to determine patient/family and health care team treatment goals, and need for post-acute services based on payer coverage, community resources, and patient preferences, and barriers to discharge  - Address psychosocial, clinical, and financial barriers to discharge as identified in assessment in conjunction with the patient/family and health care team  - Arrange appropriate level of post-acute services according to patient's   needs and preference and payer coverage in collaboration with the physician and health care team  - Communicate with and update the patient/family, physician, and health care team regarding progress on the discharge plan  - Arrange appropriate transportation to post-acute venues   Outcome: Completed Date Met: 12/03/17  CM received a response from [facility] in regards to pt's Rx  Pt will have a small co-pay for Alcohol Swabs and the remainder of discharge medication will be covered under his insurance  CM discussed results with SLIM and pt  Pt has no other needs at this time  CM department to follow pt through discharge

## 2017-12-04 LAB
GAD65 AB SER-ACNC: <5 U/ML (ref 0–5)
PANC ISLET CELL AB TITR SER: NEGATIVE {TITER}

## 2017-12-04 NOTE — CASE MANAGEMENT
[address] [facility] in the [facility] by Seth Jose for 2017  Network Utilization Review Department  Phone: [phone]; Fax [phone]  ATTENTION: The Network Utilization Review Department is now centralized for our 7 Facilities  Make a note that we have a new phone and fax numbers for our Department  Please call with any questions or concerns to [phone] and carefully follow the prompts so that you are directed to the right person  All voicemails are confidential  Fax any determinations, approvals, denials, and requests for initial or continue stay review clinical to [phone]  Due to HIGH CALL volume, it would be easier if you could please send faxed requests to expedite your requests and in part, help us provide discharge notifications faster  Initial Clinical Review    Admission: Date/Time/Statement: 12/1/17 @ 0555     Orders Placed This Encounter   Procedures    Inpatient Admission (expected length of stay for this patient is greater than two midnights)     Standing Status:   Standing     Number of Occurrences:   1     Order Specific Question:   Admitting Physician     Answer:   Randi Tavares [[id number]]     Order Specific Question:   Level of Care     Answer:   Level 1 Stepdown [13]     Order Specific Question:   Estimated length of stay     Answer:   More than 2 Midnights     Order Specific Question:   Certification     Answer:   I certify that inpatient services are medically necessary for this patient for a duration of greater than two midnights  See H&P and MD Progress Notes for additional information about the patient's course of treatment           ED: Date/Time/Mode of Arrival:   ED Arrival Information     Expected Arrival Acuity Means of Arrival Escorted By Service Admission Type    - 12/1/2017 04:16 Urgent Ambulance 3 Ivanna Feldman [facility] Medicine Urgent    Arrival Complaint    nasuea, vomitting          Chief Complaint:   Chief Complaint Patient presents with    Medication Problem      PT BROUGHT IN BY BLS FOR REACTION TO NEWLY STARTED MEDICATION FOR DIABETES    PT WAS JUST DIAGNOSED OF DIABETES A WEEK AGO, STARTED ON METFORMIN AND HAS BEEN VOMITING  FEELING LIGHT HEADED, INDIGESTION EVER SINCE  METFORMINE STOPPED YESTERDAY AND STARTED ON Lyle Lambing  PT CONTINUES TO HAVE THOSE SYMPTOMS  History of Illness: Rickie Allison is a 38 y o  male who presents with a past medical history of hypertension  He has a recently diagnosed diabetic  He was recently at [facility] for treatment of diabetic ketoacidosis  Unfortunately, he left against medical advice given difficulty with the staff there  He came to our emergency department on November 25th with an elevated blood sugar  He was given a prescription for metformin  He states that he felt that he was not able to tolerate the metformin and gave him indigestion  He then started having abdominal pain nausea and vomiting  He felt that he was fatigued and extremely dehydrated  This prompted his visit to the ED today  He denies fever chills shortness of breath palpitations or chest pain  He was noted to be in DKA in the ED    He is referred to the step-down unit for further management    ED Vital Signs:   ED Triage Vitals   Temperature Pulse Respirations Blood Pressure SpO2   12/01/17 0423 12/01/17 0423 12/01/17 0423 12/01/17 0423 12/01/17 0423   97 7 °F (36 5 °C) (!) 116 18 139/89 96 %      Temp Source Heart Rate Source Patient Position - Orthostatic VS BP Location FiO2 (%)   12/01/17 0423 12/01/17 0423 12/01/17 0423 12/01/17 0423 --   Oral Monitor Lying Right arm       Pain Score       12/01/17 0800       No Pain        Wt Readings from Last 1 Encounters:   12/02/17 118 kg (261 lb 0 4 oz)       Vital Signs (abnormal): wnl    Abnormal Labs/Diagnostic Test Results:    Glucose,  (1/2%) (A) Negative mg/dl     Ketones, UA >=80 (3+) (A) Negative mg/dl     Urobilinogen, UA 0 2 0 2, 1 0 E U /dl E U /dl     Bilirubin, UA Small (A) Negative     Blood, UA Trace-lysed (A) Negative       Bilirubin, Direct 0 22 (H) 0 00 - 0 20 mg/dL     Total Protein 8 5 (H) 6 4 - 8 2 g/dL     Sodium 125 (L) 136 - 145 mmol/L      Potassium 4 6 3 5 - 5 3 mmol/L     Chloride 86 (L) 100 - 108 mmol/L     CO2 14 (L) 21 - 32 mmol/L     Anion Gap 25 (H) 4 - 13 mmol/L     BUN 41 (H) 5 - 25 mg/dL     Creatinine 2 14 (H) 0 60 - 1 30 mg/dL     Comment: Standardized to IDMS reference method       Glucose 495 (H) 65 - 140 mg/dL       Calcium 10 4 (H) 8 3 - 10 1 mg/dL     Acetone, Bld 1+ (A)      pCO2, Brian 24 2 (L) 42 0 - 50 0 mm Hg     pO2, Brian 87 5 (H) 35 0 - 45 0 mm Hg     HCO3, Brian 12 9 (L) 24 - 30 mmol/L     Base Excess, Brian -10 5 mmol/L     O2 Content, Brian 21 0 ml/dL     O2 HGB, VENOUS 92 1 (H) 60 0 - 80 0 %       WBC 13 82 (H) 4 31 - 10 16 Thousand/uL     RBC 5 68 (H) 3 88 - 5 62 Million/uL      CXR - wnl   EKG- ST     ED Treatment:   Medication Administration from 12/01/2017 0416 to 12/01/2017 0734       Date/Time Order Dose Route Action Action by Comments     12/01/2017 0646 sodium chloride 0 9 % bolus 1,000 mL 0 mL Intravenous Stopped Stephani Mott RN      12/01/2017 0505 sodium chloride 0 9 % bolus 1,000 mL 1,000 mL Intravenous [address] Stephani Mott RN      12/01/2017 0508 ondansetron (ZOFRAN) injection 4 mg 4 mg Intravenous Given Stephani Mott RN      12/01/2017 0529 aluminum-magnesium hydroxide-simethicone (MYLANTA) 200-200-20 mg/5 mL oral suspension 30 mL 30 mL Oral Given Stephani Mott RN      12/01/2017 0553 sodium chloride 0 9 % bolus 1,000 mL 0 mL Intravenous Stopped Stephani Mott RN      12/01/2017 0553 sodium chloride 0 9 % bolus 1,000 mL 1,000 mL Intravenous [address] Stephani Mott RN      12/01/2017 0646 insulin regular (HumuLIN R,NovoLIN R) 1 Units/mL in sodium chloride 0 9 % 100 mL infusion 10 Units/hr Intravenous [address] Stephani Mott RN      12/01/2017 0655 sodium chloride 0 9 % infusion 2,000 mL/hr Intravenous Not Given Yamileth Verdin, RICKY Duplicate order          Past Medical/Surgical History: Active Ambulatory Problems     Diagnosis Date Noted    No Active Ambulatory Problems     Resolved Ambulatory Problems     Diagnosis Date Noted    No Resolved Ambulatory Problems     Past Medical History:   Diagnosis Date    Diabetes mellitus ([facility] )     Hypertension        Admitting Diagnosis: Diabetic ketoacidosis ([facility] ) [E13 10]  Hyponatremia [E87 1]  Nausea [R11 0]  Hyperglycemia [R73 9]    Age/Sex: 38 y o  male    Assessment/Plan: Assessment:   1  DKA  2  DM II   3  LIANG  4  Leukocytosis  5  High anion gap metabolic acidosis      Plan:                Neuro:   Monitor neuro status   CAM ICU  Sleep hygiene               CV: Monitor hemodynamic  Continue IV fluid resuscitation   Will hold home dose lisinopril and hydrochlorothiazide in the setting of a LIANG               Lung:   Pulmonary hygiene  Early mobilization  Incentive spirometry              GI:   NPO until anion gap closes              FEN:   Monitor electrolytes and replete as necessary  Continue IV fluid resuscitation  Monitor BMP Mag Ical vbg q 6 hours              :   Monitor urine output closely  UA was negative for infectious etiology              ID:   There is no infectious etiology  Monitor white blood cells and temperature curve              Heme:   Heparin subcutaneously for DVT prophylaxis              Endo:   Started on DKA protocol  Will consult Endocrinology given new diagnosis of diabetes              Msk/Skin:   Turn and reposition while in bed  Early mobilization              Disposition:   Monitor in step-down while on DKA protocol    VTE Pharmacologic Prophylaxis: Sequential compression device (Venodyne)  and Heparin  VTE Mechanical Prophylaxis: sequential compression device   Invasive lines and devices:       Invasive Devices            Peripheral Intravenous Line                     Peripheral IV 12/01/17 Left Hand less than 1 day                 Code Status: Level 1 - Full Code  Given critical illness, patient length of stay will require greater than two midnights    Counseling / Coordination of Care  Total Critical Care time spent 49 minutes excluding procedures, teaching and family updates  Admission Orders:  Scheduled Meds:   Continuous Infusions:   No current facility-administered medications for this encounter  PRN Meds:     Act as kiran   Tele   Act as kiran   BRP   NPO adv  To cons carb diet   ua chloride random , micoalbumin , osmolality , K , urea nitrogen   Daily weight   I&O   Neuro checks  q4hr  Enc deep breathing and coughing   Fall precautions  Serial trop   D5 NSS @ 250/hr  Sq heparin q8hr   Iv zofran q4h prn   Bmp, mg , calcium , hgb a1c   SCD  IV insulin gtt   Calcium ionized   VBG   Po kdur , mylanta   Iv protonix qd  Iv mg over 2 hrs   Iv kcl   KCL in D4 NSS @ 125/hr  Fingerstick ac and hs  Calcium gluconate NSS @ 200 /hr  Cbc , bmp, mg , phos   12/3    Endocrinology consult 12/1  Assessment: This is a 38y o -year-old male with newly diagnosed diabetes with diabetic ketoacidosis, renal insufficiency    Plan: Will continue IV insulin infusion until bedtime tonight  Switch to critical care IV insulin infusion-fingersticks need to be monitored q 2 hours on IV insulin infusion  Anion gap has normalized so will switch to subcu insulin therapy starting at bedtime tonight   He will receive Lantus 45 units at bedtime and IV insulin infusion and D5 will be discontinued 1 hour later  Diet is being advanced, start Humalog 12 units before meals tomorrow  Fingersticks to be monitored before meals and bedtime starting tomorrow  Patient likely has type 2 diabetes however Will check deanna and islet cell antibodies as well as C-peptide level as type 1 diabetes is also a possibility    Agree with stopping metformin    Renal insufficiency-unclear if acute or chronic-management as per primary team     Critical Care progress note 12/2  Assessment and Plan  Principal Problem:    Diabetic ketoacidosis ([facility] )  Active Problems:    Hyponatremia    Nausea    LIANG (acute kidney injury) ([facility] )  Resolved Problems:    * No resolved hospital problems  *   Neuro:  No issues    CV:  Hypertension on lisinopril hydrochlorothiazide at home  These were held due to in both hypotension and LIANG on arrival   Patient's blood pressure improved as well as kidney function  If continues could consider restarting  Continue on cardio/pulm monitoring    IV access:  Peripheral IVs    Pulm:  No issues  No pulmonary history  Encourage deep breathing/coughing/IS/PulmToileting   Supplemental O2 for saturations less than 92%    GI:  Nausea patient thought this might be due to metformin however difficult to say whether not this was due to that or DKA  :  LIANG no kidney history  Was likely pre renal due to DKA  Downtrending  Follow up a m  renal indices  Accurate I and Os  F/E/N:  Hyponatremia likely pseudohyponatremia due to hyperglycemia  Now within normal limits  Will discontinue serial electrolyte panel    Diet:  Diabetic clear liquids can advance as tolerated without IVF   Heme:    No issues  DVT Prophylaxis heparin subcutaneous  SCDs in place bilaterally    ID:  Likely reactive leukocytosis  No fevers  Follow up a m  labs   Endo:  DKA recent newly diagnosed type 2 diabetic  Recently stopped his medications due to side effects  Am in DKA  Was placed on DKA protocol yesterday  Anion gap closed  Transition to critical care insulin drip  Endocrine saw patient and recommended patient be placed on 45 units of Lantus at night with 12 units of Lantus with meals as well as sliding scale  Thus far patient's glucose has been within normal limits    Continue to check a c  and HS    MSK/Skin:  Out of bed as tolerated   [facility]

## 2017-12-12 ENCOUNTER — ALLSCRIPTS OFFICE VISIT (OUTPATIENT)
Dept: OTHER | Facility: OTHER | Age: 42
End: 2017-12-12

## 2018-01-22 VITALS
BODY MASS INDEX: 38.5 KG/M2 | DIASTOLIC BLOOD PRESSURE: 72 MMHG | SYSTOLIC BLOOD PRESSURE: 128 MMHG | WEIGHT: 275 LBS | HEIGHT: 71 IN

## 2018-01-23 NOTE — MISCELLANEOUS
Provider Comments  Provider Comments:   PATIENT NO SHOWED FOR 12- APPT  Signatures   Electronically signed by :  Michael Sam [facility]; Dec 12 2017  3:29PM EST                       (Author)

## 2018-07-18 LAB
CREAT ?TM UR-SCNC: 423 UMOL/L
HBA1C MFR BLD HPLC: 5.5 %
HBA1C MFR BLD HPLC: 5.5 %
MICROALBUMIN UR-MCNC: 1.7 MG/L (ref 0–20)
MICROALBUMIN/CREAT UR: 4 MG/G{CREAT}

## 2018-07-18 PROCEDURE — 3061F NEG MICROALBUMINURIA REV: CPT | Performed by: INTERNAL MEDICINE

## 2018-07-26 LAB — HBA1C MFR BLD HPLC: 5.4 %

## 2018-07-30 RX ORDER — AMLODIPINE BESYLATE 5 MG/1
5 TABLET ORAL DAILY
COMMUNITY
Start: 2018-07-26 | End: 2018-07-31 | Stop reason: CLARIF

## 2018-07-30 RX ORDER — NIFEDIPINE 60 MG/1
60 TABLET, FILM COATED, EXTENDED RELEASE ORAL DAILY
Refills: 3 | Status: ON HOLD | COMMUNITY
Start: 2018-07-18 | End: 2021-06-30

## 2018-07-30 RX ORDER — TRIAMCINOLONE ACETONIDE 1 MG/G
CREAM TOPICAL
Refills: 3 | Status: ON HOLD | COMMUNITY
Start: 2018-05-22 | End: 2021-06-30

## 2018-07-30 RX ORDER — CALCIPOTRIENE 50 UG/G
CREAM TOPICAL
Refills: 3 | COMMUNITY
Start: 2018-05-22 | End: 2018-07-31 | Stop reason: CLARIF

## 2018-07-30 RX ORDER — FLUTICASONE PROPIONATE 50 MCG
SPRAY, SUSPENSION (ML) NASAL
Refills: 3 | COMMUNITY
Start: 2018-07-18 | End: 2019-04-16 | Stop reason: CLARIF

## 2018-07-30 RX ORDER — FLUOCINONIDE TOPICAL SOLUTION USP, 0.05% 0.5 MG/ML
SOLUTION TOPICAL
Refills: 2 | COMMUNITY
Start: 2018-05-22 | End: 2018-07-31 | Stop reason: CLARIF

## 2018-07-30 RX ORDER — BETAMETHASONE DIPROPIONATE 0.05 %
OINTMENT (GRAM) TOPICAL
Refills: 2 | Status: ON HOLD | COMMUNITY
Start: 2018-07-20 | End: 2021-06-30

## 2018-07-31 ENCOUNTER — OFFICE VISIT (OUTPATIENT)
Dept: INTERNAL MEDICINE CLINIC | Facility: CLINIC | Age: 43
End: 2018-07-31
Payer: COMMERCIAL

## 2018-07-31 ENCOUNTER — TELEPHONE (OUTPATIENT)
Dept: INTERNAL MEDICINE CLINIC | Facility: CLINIC | Age: 43
End: 2018-07-31

## 2018-07-31 VITALS
HEIGHT: 71 IN | WEIGHT: 246.2 LBS | OXYGEN SATURATION: 97 % | BODY MASS INDEX: 34.47 KG/M2 | SYSTOLIC BLOOD PRESSURE: 122 MMHG | DIASTOLIC BLOOD PRESSURE: 78 MMHG | HEART RATE: 90 BPM

## 2018-07-31 DIAGNOSIS — E66.9 OBESITY (BMI 30-39.9): ICD-10-CM

## 2018-07-31 DIAGNOSIS — G40.909 SEIZURE DISORDER (HCC): ICD-10-CM

## 2018-07-31 DIAGNOSIS — I38 NONBACTERIAL THROMBOTIC ENDOCARDITIS: ICD-10-CM

## 2018-07-31 DIAGNOSIS — Z71.3 DIETARY COUNSELING AND SURVEILLANCE: ICD-10-CM

## 2018-07-31 DIAGNOSIS — Z13.31 DEPRESSION SCREENING NEGATIVE: ICD-10-CM

## 2018-07-31 DIAGNOSIS — I10 ESSENTIAL HYPERTENSION: Chronic | ICD-10-CM

## 2018-07-31 DIAGNOSIS — Z98.890 S/P MITRAL VALVE REPAIR: Chronic | ICD-10-CM

## 2018-07-31 DIAGNOSIS — E11.51 TYPE 2 DIABETES MELLITUS WITH DIABETIC PERIPHERAL ANGIOPATHY WITHOUT GANGRENE, WITHOUT LONG-TERM CURRENT USE OF INSULIN (HCC): Primary | Chronic | ICD-10-CM

## 2018-07-31 PROBLEM — Z86.73 HISTORY OF CVA (CEREBROVASCULAR ACCIDENT): Chronic | Status: ACTIVE | Noted: 2018-07-31

## 2018-07-31 PROBLEM — N17.9 AKI (ACUTE KIDNEY INJURY) (HCC): Status: RESOLVED | Noted: 2017-12-02 | Resolved: 2018-07-31

## 2018-07-31 PROCEDURE — 3078F DIAST BP <80 MM HG: CPT | Performed by: INTERNAL MEDICINE

## 2018-07-31 PROCEDURE — 99204 OFFICE O/P NEW MOD 45 MIN: CPT | Performed by: INTERNAL MEDICINE

## 2018-07-31 PROCEDURE — 3074F SYST BP LT 130 MM HG: CPT | Performed by: INTERNAL MEDICINE

## 2018-07-31 NOTE — PROGRESS NOTES
INTERNAL MEDICINE INITIAL OFFICE VISIT  [facility] - [facility] OF [address] [place] [address]    NAME: Danette Fulton  AGE: 38 y o  SEX: male  : 1975     DATE: 2018     Assessment and Plan:     1  Type 2 diabetes mellitus with diabetic peripheral angiopathy without gangrene, without long-term current use of insulin ([address] )    Will attempt to get recent lab work from [facility]  Reported blood sugars from patient are well controlled, but only checks blood sugars in AM and not at bedtime or before meals  Patient's BMI is elevated  Discussed need for adherence to diabetic diet and increase exercise to 150 minutes/week  Will get recent eye exam report  Patient has evidence of peripheral angiopathy and calluses on feet  Will set him up with podiatry  - Hemoglobin A1C; Future  - Lipid panel; Future  - Basic metabolic panel; Future  - Microalbumin / creatinine urine ratio; Future  - Ambulatory referral to Podiatry; Future    2  Nonbacterial thrombotic endocarditis    Patient is s/p mitral valve repair  Follow-up with cardiology  3  Essential hypertension    Blood pressure is well controlled  4  Obesity (BMI 30-39  9)    Patient's Body mass index is 34 83 kg/m²  Discussed the patient's BMI  The BMI is above average; BMI counseling and education was provided to the patient  General weight loss/lifestyle modification strategies discussed (elicit support from others; identify saboteurs; non-food rewards, etc)  Regular aerobic exercise program was recommended at least 3 times per week    5  Seizure disorder ([address] )    From reviewing some previous records and by his own account, his seizure history has been questionable whether he has had true seizures or not  Will check MRI brain and refer to neurology     - MRI brain wo contrast; Future  - Ambulatory referral to Neurology; Future    Return in about 4 months (around 2018) for Follow-up       Counseling:     · Medication Side Effects - Adverse side effects of medications were reviewed with the patient/guardian today: Yes  · I have spent 45 minutes with Patient  today in which greater than 50% of this time was spent in counseling/coordination of care regarding Diagnostic results, Prognosis, Risks and benefits of tx options, Intructions for management, Patient and family education, Importance of tx compliance, Risk factor reductions and Impressions  · Barriers to treatment include: Health information/records not available a this time     Chief Complaint:     Chief Complaint   Patient presents with   [facility]     new pt      History of Present Illness:       Patient presents to [facility]  Patient was previously receiving care in [facility]  Patient's previous primary care doctor was practicing out of [facility]  No current records are available for review from his old primary care doctor  Record release has been signed  Patient states that he gave lab work within the past week through [address] [place] [address] and he has not heard any results from this laboratory work  Patient states his laboratory work was ordered by his old primary care doctor  Patient states that he has filed for social security because he can no longer work due to seizures he has been having and he is scared to travel  Patient is not driving  Patient states he was previously seeing a neurologist in [place] but was never given any answers  Patient states [facility] had started him on Dilantin in the past for possible seizure though his history has always been somewhat questionable and then he went back to [place] and his doctors there to come off the Dilantin and tried him on Keppra but then stated that he was still having symptoms even on the [address]  Patient states the other night his wife noticed him generally convulsing in bed and he was not breathing and then ultimately he snapped out of it and was a little confused afterwards    Patient states that he did not go to the emergency department  Patient denies any history of sleep apnea or periodic limb movements  He was diagnosed with type 2 diabetes back in December of 2017  His antibody levels were negative  Patient was started on Levemir and Humalog  Patient states he is currently only taking Levemir and that his blood sugars in the morning have been well controlled  Patient is usually not check his blood sugars at bedtime or before meals  Patient's last A1c from our hospital was 10 8%  Patient does not currently have a foot doctor  Patient states he had an eye exam in the past year and that did not show any evidence of retinopathy  Patient also recently saw cardiologist down at [facility]  Patient has a previously thought history of TIA and he was found to have a mitral mass  This much or mass 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 but then ultimately ended up being surgically removed and he had a mitral valve repair in 2015  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, marantic endocarditis  He notes problems in the past where he has lost memory and has stared off into space  These events were potentially presumed to be a seizure  He ended up seeing a Dr Philip Lee of Neurology at [facility] and he had an MRI performed which reportedly showed evidence of a CVA  I do not have the report of this MRI  The following portions of the patient's history were reviewed and updated as appropriate: allergies, current medications, past family history, past medical history, past social history, past surgical history and problem list      Review of Systems:     Review of Systems  12 point ROS completed  Please see above HPI for pertinent positives and negatives  Remaining ROS was completely negative       Past Medical History:     Past Medical History:   Diagnosis Date    Diabetes mellitus ([address] )     Hypertension     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, but path consistent with thrombotic fibrotic mass, marantic endocarditis    Seizure ([facility] )     possible, unclear history    TIA (transient ischemic attack)     Possible      Past Surgical History:     Past Surgical History:   Procedure Laterality Date    CARDIAC SURGERY      ROTATOR CUFF REPAIR        Social History:   He reports that he has been smoking Cigarettes  He started smoking about 22 years ago  He has never used smokeless tobacco  He reports that he drinks alcohol  He reports that he uses drugs, including Marijuana  Family History:     Family History   Problem Relation Age of Onset    Heart attack Mother     Breast cancer Maternal Grandmother     Cancer Paternal Grandfather       Current Medications:     Current Outpatient Prescriptions:     betamethasone dipropionate (DIPROSONE) 0 05 % ointment, APPLY TO AREAS OF PSORIASIS ON BODY(AVIOD FACE)ONCE DAILY 3 TO 4 TIMES A WEEK AS NEEDED FOR FLARE UP, Disp: , Rfl: 2    fluticasone (FLONASE) 50 mcg/act nasal spray, USE 1 SPRAY IN EACH NOSTRIL ONCE OR TWICE A DAY, Disp: , Rfl: 3    Lancets (FREESTYLE) lancets, Use as instructed, Disp: 100 each, Rfl: 0    lisinopril (ZESTRIL) 40 mg tablet, Take 40 mg by mouth, Disp: , Rfl:     NIFEdipine ER (ADALAT CC) 60 MG 24 hr tablet, Take 60 mg by mouth daily, Disp: , Rfl: 3    sitaGLIPtin (JANUVIA) 100 mg tablet, Take 100 mg by mouth daily, Disp: , Rfl:     triamcinolone (KENALOG) 0 1 % cream, APPLY TO AREAS OF PSORIASIS ON FACE AND SKIN FOLDS ONCE DAILY 3-4 DAYS A WEEK AS NEEDED FOR FLARES, Disp: , Rfl: 3    insulin detemir (LEVEMIR FLEXPEN) 100 Units/mL injection pen, Inject 10 Units under the skin every morning, Disp: 5 pen, Rfl: 0     Allergies:      Allergies   Allergen Reactions    Cat Hair Extract Allergic Rhinitis        Physical Exam:     /78 (BP Location: Left arm, Patient Position: Sitting, Cuff Size: Standard)   Pulse 90   Ht 5' 10 5" (1 791 m)   Wt 112 kg (246 lb 3 2 oz)   SpO2 97%   BMI 34 83 kg/m²     Physical Exam Constitutional: He is oriented to person, place, and time  He appears well-developed and well-nourished  No distress  Obesity   Eyes: Conjunctivae are normal  Right eye exhibits no discharge  Left eye exhibits no discharge  No scleral icterus  Neck: Neck supple  No JVD present  No thyromegaly present  Cardiovascular: Normal rate, regular rhythm, normal heart sounds and intact distal pulses  Exam reveals no gallop and no friction rub  Pulses are no weak pulses  No murmur heard  Pulses:       Dorsalis pedis pulses are 2+ on the right side, and 2+ on the left side  Posterior tibial pulses are 2+ on the right side, and 2+ on the left side  Pulmonary/Chest: Effort normal and breath sounds normal  No respiratory distress  He has no wheezes  He has no rales  He exhibits no tenderness  Abdominal: Soft  Bowel sounds are normal  He exhibits no distension and no mass  There is no tenderness  There is no rebound and no guarding  Musculoskeletal: Normal range of motion  He exhibits no edema (Evidence of hyperpigmentation scattered throughout lower extremities)  Feet:   Right Foot:   Skin Integrity: Positive for callus  Negative for ulcer, skin breakdown, erythema, warmth or dry skin  Left Foot:   Skin Integrity: Positive for callus  Negative for ulcer, skin breakdown, erythema, warmth or dry skin  Lymphadenopathy:     He has no cervical adenopathy  Neurological: He is alert and oriented to person, place, and time  Skin: Skin is warm and dry  He is not diaphoretic  Psychiatric: He has a normal mood and affect  His behavior is normal        Diabetic Foot Exam  Patient's shoes and socks removed  Right Foot/Ankle   Right Foot Inspection  Skin Exam: skin normal, skin intact, callus and callus no dry skin, no warmth, no erythema, no maceration, no abnormal color, no pre-ulcer and no ulcer                          Toe Exam: ROM and strength within normal limits  Sensory     Proprioception: intact Monofilament testing: intact  Vascular  Capillary refills: < 3 seconds  The right DP pulse is 2+  The right PT pulse is 2+  Left Foot/Ankle  Left Foot Inspection  Skin Exam: skin normal, skin intact and callusno dry skin, no warmth, no erythema, no maceration, normal color, no pre-ulcer and no ulcer                         Toe Exam: ROM and strength within normal limits                   Sensory     Proprioception: intact  Monofilament: intact  Vascular  Capillary refills: < 3 seconds  The left DP pulse is 2+  The left PT pulse is 2+  Assign Risk Category:  Deformity present; No loss of protective sensation; No weak pulses       Risk: 1     Data:     Laboratory Results: I have personally reviewed the pertinent laboratory results/reports   Radiology/Other Diagnostic Testing Results: I have personally reviewed pertinent reports        PHQ-9  Negative for depression with PHQ2 score of 0     David Vazquez DO  [facility] [address]

## 2018-07-31 NOTE — PATIENT INSTRUCTIONS
Type 2 Diabetes in Adults   AMBULATORY CARE:   Type 2 diabetes  is a disease that affects how your body uses glucose (sugar)  Normally, when the blood sugar level increases, the pancreas makes more insulin  Insulin helps move sugar out of the blood so it can be used for energy  Type 2 diabetes develops because either the body cannot make enough insulin, or it cannot use the insulin correctly  After many years, your pancreas may stop making insulin  Common symptoms include the following:   · More hunger or thirst than usual     · Frequent urination     · Weight loss without trying     · Blurred vision  Call 911 if you have any of the following:   · You have any of the following signs of a stroke:      ¨ Numbness or drooping on one side of your face     ¨ Weakness in an arm or leg    ¨ Confusion or difficulty speaking    ¨ Dizziness, a severe headache, or vision loss    · You have any of the following signs of a heart attack:      ¨ Squeezing, pressure, or pain in your chest that lasts longer than 5 minutes or returns    ¨ Discomfort or pain in your back, neck, jaw, stomach, or arm     ¨ Trouble breathing    ¨ Nausea or vomiting    ¨ Lightheadedness or a sudden cold sweat, especially with chest pain or trouble breathing  Seek care immediately if:   · You have severe abdominal pain, or the pain spreads to your back  You may also be vomiting  · You have trouble staying awake or focusing  · You are shaking or sweating  · You have blurred or double vision  · Your breath has a fruity, sweet smell  · Your breathing is deep and labored, or rapid and shallow  · Your heartbeat is fast and weak  Contact your healthcare provider if:   · You are vomiting or have diarrhea  · You have an upset stomach and cannot eat the foods on your meal plan  · You feel weak or more tired than usual      · You feel dizzy, have headaches, or are easily irritated  · Your skin is red, warm, dry, or swollen  · You have a wound that does not heal      · You have numbness in your arms or legs  · You have trouble coping with your illness, or you feel anxious or depressed  · You have questions or concerns about your condition or care  Treatment for type 2 diabetes  includes keeping your blood sugar at a normal level  You must eat the right foods, and exercise regularly  You may need medicine if you cannot control your blood sugar level with nutrition and exercise  You may also need medicine to prevent heart disease, a complication of type 2 diabetes  You may  need any of the following:  · Hypoglycemic medicines or insulin  may be given to decrease the amount of sugar in your blood  · Blood pressure medicine  may be given to lower your blood pressure  Your blood pressure should be less than 140/90  · Cholesterol lowering medicine  may be given to prevent heart disease  · Antiplatelets , such as aspirin, help prevent blood clots  Take your antiplatelet medicine exactly as directed  These medicines make it more likely for you to bleed or bruise  If you are told to take aspirin, do not take acetaminophen or ibuprofen instead  · Take your medicine as directed  Contact your healthcare provider if you think your medicine is not helping or if you have side effects  Tell him or her if you are allergic to any medicine  Keep a list of the medicines, vitamins, and herbs you take  Include the amounts, and when and why you take them  Bring the list or the pill bottles to follow-up visits  Carry your medicine list with you in case of an emergency  Check your blood sugar level: You will be taught how to check a small drop of blood in a glucose monitor  You will need to check your blood sugar level at least 3 times each day if you are on insulin  Ask your healthcare provider when and how often to check during the day  If you check your blood sugar level before a meal , it should be between 80 and 130 mg/dL   If you check your blood sugar level 1 to 2 hours after a meal , it should be less than 180 mg/dL  Ask your healthcare provider if these are good goals for you  Write down your results, and show them to your healthcare provider  He may use the results to make changes to your medicine, food, and exercise schedules  If your blood sugar level is too low: Your blood sugar level is too low if it goes below 70 mg/dL  If the level is too low, eat or drink 15 grams of fast-acting carbohydrate  These are found naturally in fruits  Fast-acting carbohydrates will raise your blood sugar level quickly  Examples of 15 grams of fast-acting carbohydrate are 4 ounces (½ cup) of fruit juice or 4 ounces of regular soda  Other examples are 2 tablespoons of raisins or 3 to 4 glucose tablets  Check your blood sugar level 15 minutes later  If the level is still low (less than 100 mg/dL), eat another 15 grams of carbohydrate  When the level returns to 100 mg/dL, eat a snack or meal that contains carbohydrates  This will help prevent another drop in blood sugar  Always carefully follow your healthcare provider's instructions on how to treat low blood sugar levels  Check your feet each day for sores:  Wear shoes and socks that fit correctly  Do not trim your toenails  Ask your healthcare provider for more information about foot care  Follow your meal plan:  A dietitian will help you make a meal plan to keep your blood sugar level steady  Do not skip meals  Your blood sugar level may drop too low if you have taken diabetes medicine and do not eat  · Keep track of carbohydrates (sugar and starchy foods)  Your blood sugar level can get too high if you eat too many carbohydrates  Your dietitian will help you plan meals and snacks that have the right amount of carbohydrates  · Eat low-fat foods , such as skinless chicken and low-fat milk  · Eat less sodium (salt)    Limit high-sodium foods, such as soy sauce, potato chips, and soup  Do not add salt to food you cook  Limit your use of table salt  You should have less than 2,300 mg of sodium per day  · Eat high-fiber foods , such as vegetables, whole grain breads, and beans  · Limit alcohol  Alcohol affects your blood sugar level and can make it harder to manage your diabetes  Limit alcohol to 1 drink a day if you are a woman  Limit alcohol to 2 drinks a day if you are a man  A drink of alcohol is 12 ounces of beer, 5 ounces of wine, or 1½ ounces of liquor  Maintain a healthy weight:  Ask your healthcare provider how much you should weigh  A healthy weight can help you control your diabetes  Ask your provider to help you create a weight loss plan if you are overweight  Together you can set manageable weight loss goals  Exercise as directed:  Exercise can help keep your blood sugar level steady, decrease your risk of heart disease, and help you lose weight  Stretch before and after you exercise  Exercise for at least 150 minutes every week  Spread this amount of exercise over at least 3 days a week  Do not skip exercise more than 2 days in a row  Include muscle strengthening activities 2 to 3 days each week  Older adults should include balance training 2 to 3 times each week  Activities that help increase balance include yoga and tacos chi  Work with your healthcare provider to create an exercise plan  · Check your blood sugar level before and after exercise  Healthcare providers may tell you to change the amount of insulin you take or food you eat  If your blood sugar level is high, check your blood or urine for ketones before you exercise  Do not exercise if your blood sugar level is high and you have ketones  · If your blood sugar level is less than 100 mg/dL, have a carbohydrate snack before you exercise  Examples are 4 to 6 crackers, ½ banana, 8 ounces (1 cup) of milk, or 4 ounces (½ cup) of juice   Drink water or liquids that do not contain sugar before, during, and after exercise  Ask your dietitian or healthcare provider which liquids you should drink when you exercise  · Do not sit for longer than 30 minutes  If you cannot walk around, at least stand up  This will help you stay active and keep your blood circulating  Do not smoke:  Nicotine and other chemicals in cigarettes and cigars can cause lung damage and make it more difficult to manage your diabetes  Ask your healthcare provider for information if you currently smoke and need help to quit  Do not use e-cigarettes or smokeless tobacco in place of cigarettes or to help you quit  They still contain nicotine  Check your blood pressure as directed:  Ask your healthcare provider what your blood pressure should be  Most adults with diabetes and high blood pressure should have a systolic blood pressure (first number) less than 140  Your diastolic blood pressure (second number) should be less than 90  Wear medical alert identification:  Wear medical alert jewelry or carry a card that says you have diabetes  Ask your healthcare provider where to get these items  Ask about vaccines: You have a higher risk for serious illness if you get the flu, pneumonia, or hepatitis  Ask your healthcare provider if you should get a flu, pneumonia, or hepatitis B vaccine, and when to get the vaccine  Follow up with your healthcare provider as directed: You may need to return to have your A1c checked every 3 months  You will need to return at least once each year to have your feet checked  You will need an eye exam once a year to check for retinopathy  You will also need urine tests every year to check for kidney problems  You may need tests to monitor for heart disease such as an EKG, stress test, blood pressure monitoring, and blood tests  Write down your questions so you remember to ask them during your visits     © 2017 [address] [employer] Information is for End User's use only and may not be sold, redistributed or otherwise used for commercial purposes  All illustrations and images included in CareNotes® are the copyrighted property of A [employer] M [employer]  or [employer]  The above information is an  only  It is not intended as medical advice for individual conditions or treatments  Talk to your doctor, nurse or pharmacist before following any medical regimen to see if it is safe and effective for you

## 2018-08-21 ENCOUNTER — TELEPHONE (OUTPATIENT)
Dept: NEUROLOGY | Facility: CLINIC | Age: 43
End: 2018-08-21

## 2018-08-31 ENCOUNTER — TELEPHONE (OUTPATIENT)
Dept: INTERNAL MEDICINE CLINIC | Facility: CLINIC | Age: 43
End: 2018-08-31

## 2018-08-31 NOTE — TELEPHONE ENCOUNTER
Can just stop injecting it  I would still continue the Collette Flaco for now  Should get labs that are already ordered in the system as scheduled (become active on 10/31) and we see at that time how his A1C is doing off insulin

## 2018-08-31 NOTE — TELEPHONE ENCOUNTER
Patient called-said he had labs done in [place] that shows he doesn't have diabeties anymore  Simon Dang He's taking meds and insulin  Labs done in [facility] on 7/19th  Dr Shala Lovett MD, was the one who read the labs and contacted the patient  Told to fup w/ PCP  Patient said he would call the drs office and try to get records but asked if we could as well so he doesn't have to pay for them    BX#[phone]

## 2018-08-31 NOTE — TELEPHONE ENCOUNTER
He is still considered to be a diabetic, but his labs are currently extremely well controlled with his A1c being 5 4%  Can trial him off insulin if he would like   Already looks like we have previous records/labs scanned into our system

## 2018-09-05 ENCOUNTER — OFFICE VISIT (OUTPATIENT)
Dept: INTERNAL MEDICINE CLINIC | Facility: CLINIC | Age: 43
End: 2018-09-05
Payer: COMMERCIAL

## 2018-09-05 VITALS
SYSTOLIC BLOOD PRESSURE: 120 MMHG | BODY MASS INDEX: 33.91 KG/M2 | WEIGHT: 242.2 LBS | HEIGHT: 71 IN | OXYGEN SATURATION: 98 % | HEART RATE: 89 BPM | DIASTOLIC BLOOD PRESSURE: 88 MMHG

## 2018-09-05 DIAGNOSIS — G40.909 SEIZURE DISORDER (HCC): Primary | ICD-10-CM

## 2018-09-05 DIAGNOSIS — Z86.73 HISTORY OF CVA (CEREBROVASCULAR ACCIDENT): Chronic | ICD-10-CM

## 2018-09-05 PROCEDURE — 99213 OFFICE O/P EST LOW 20 MIN: CPT | Performed by: INTERNAL MEDICINE

## 2018-09-05 RX ORDER — AMLODIPINE BESYLATE 5 MG/1
5 TABLET ORAL DAILY
Refills: 5 | COMMUNITY
Start: 2018-08-02 | End: 2020-08-14 | Stop reason: SDUPTHER
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
## 2018-09-24 ENCOUNTER — TELEPHONE (OUTPATIENT)
Dept: INTERNAL MEDICINE CLINIC | Facility: CLINIC | Age: 43
End: 2018-09-24

## 2018-09-25 ENCOUNTER — APPOINTMENT (OUTPATIENT)
Dept: LAB | Facility: CLINIC | Age: 43
End: 2018-09-25
Payer: COMMERCIAL

## 2018-09-25 ENCOUNTER — TELEPHONE (OUTPATIENT)
Dept: NEUROLOGY | Facility: CLINIC | Age: 43
End: 2018-09-25

## 2018-09-25 ENCOUNTER — OFFICE VISIT (OUTPATIENT)
Dept: NEUROLOGY | Facility: CLINIC | Age: 43
End: 2018-09-25
Payer: COMMERCIAL

## 2018-09-25 VITALS
DIASTOLIC BLOOD PRESSURE: 82 MMHG | HEART RATE: 83 BPM | HEIGHT: 71 IN | SYSTOLIC BLOOD PRESSURE: 124 MMHG | WEIGHT: 244 LBS | BODY MASS INDEX: 34.16 KG/M2

## 2018-09-25 DIAGNOSIS — G40.909 SEIZURE DISORDER (HCC): ICD-10-CM

## 2018-09-25 LAB
ALBUMIN SERPL BCP-MCNC: 4.1 G/DL (ref 3.5–5)
ALP SERPL-CCNC: 67 U/L (ref 46–116)
ALT SERPL W P-5'-P-CCNC: 31 U/L (ref 12–78)
ANION GAP SERPL CALCULATED.3IONS-SCNC: 4 MMOL/L (ref 4–13)
AST SERPL W P-5'-P-CCNC: 20 U/L (ref 5–45)
BILIRUB SERPL-MCNC: 1.23 MG/DL (ref 0.2–1)
BUN SERPL-MCNC: 9 MG/DL (ref 5–25)
CALCIUM SERPL-MCNC: 9 MG/DL (ref 8.3–10.1)
CHLORIDE SERPL-SCNC: 108 MMOL/L (ref 100–108)
CO2 SERPL-SCNC: 28 MMOL/L (ref 21–32)
CREAT SERPL-MCNC: 1.39 MG/DL (ref 0.6–1.3)
ERYTHROCYTE [DISTWIDTH] IN BLOOD BY AUTOMATED COUNT: 14 % (ref 11.6–15.1)
GFR SERPL CREATININE-BSD FRML MDRD: 72 ML/MIN/1.73SQ M
GLUCOSE P FAST SERPL-MCNC: 97 MG/DL (ref 65–99)
HCT VFR BLD AUTO: 49.2 % (ref 36.5–49.3)
HGB BLD-MCNC: 15.7 G/DL (ref 12–17)
MCH RBC QN AUTO: 27.7 PG (ref 26.8–34.3)
MCHC RBC AUTO-ENTMCNC: 31.9 G/DL (ref 31.4–37.4)
MCV RBC AUTO: 87 FL (ref 82–98)
PLATELET # BLD AUTO: 135 THOUSANDS/UL (ref 149–390)
PMV BLD AUTO: 13.5 FL (ref 8.9–12.7)
POTASSIUM SERPL-SCNC: 4 MMOL/L (ref 3.5–5.3)
PROT SERPL-MCNC: 8 G/DL (ref 6.4–8.2)
RBC # BLD AUTO: 5.67 MILLION/UL (ref 3.88–5.62)
SODIUM SERPL-SCNC: 140 MMOL/L (ref 136–145)
WBC # BLD AUTO: 10.09 THOUSAND/UL (ref 4.31–10.16)

## 2018-09-25 PROCEDURE — 99204 OFFICE O/P NEW MOD 45 MIN: CPT | Performed by: PSYCHIATRY & NEUROLOGY

## 2018-09-25 PROCEDURE — 80053 COMPREHEN METABOLIC PANEL: CPT

## 2018-09-25 PROCEDURE — 85027 COMPLETE CBC AUTOMATED: CPT

## 2018-09-25 PROCEDURE — 36415 COLL VENOUS BLD VENIPUNCTURE: CPT

## 2018-09-25 NOTE — TELEPHONE ENCOUNTER
Pt calls to state that Dr Carlee Villaseñor would like to have pt seen Dr aNthaniel Ching  Pt was informed by scheduling that she is not seeing any new patients at this time  Please advise      [phone]
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
## 2018-09-26 ENCOUNTER — TELEPHONE (OUTPATIENT)
Dept: NEUROLOGY | Facility: CLINIC | Age: 43
End: 2018-09-26

## 2018-09-26 ENCOUNTER — TELEPHONE (OUTPATIENT)
Dept: INTERNAL MEDICINE CLINIC | Facility: CLINIC | Age: 43
End: 2018-09-26

## 2018-09-26 NOTE — TELEPHONE ENCOUNTER
----- Message from Marleni Pena MD sent at 9/26/2018  9:38 AM EDT -----  Please call the patient regarding his abnormal result    Please have the patient follow up with family physician regarding abnormal blood work including increased bilirubin

## 2018-09-26 NOTE — TELEPHONE ENCOUNTER
PT SAID HE SPOKE WITH SOMEONE THIS MORNING ABOUT DR BELLAMY FILLING OUT A FORM FOR PHYSICIAN VERIFICATION FORM SAYIN HE CANT WORK DUE TO HIS SEIZURE DISORDER I PUT THE FORM IN DRS BIN PT NEEDS TO HAVE THIS COMPLETED TODAY BEFORE 4:30PM HE HAS TO DRIVE DOWN TO [place] AND HAND DELIVER   PLEASE ADVISE PT WHEN ITS DONE SO HE CAN  TODAY

## 2018-09-27 ENCOUNTER — TELEPHONE (OUTPATIENT)
Dept: NEUROLOGY | Facility: CLINIC | Age: 43
End: 2018-09-27

## 2018-09-28 ENCOUNTER — TELEPHONE (OUTPATIENT)
Dept: INTERNAL MEDICINE CLINIC | Facility: CLINIC | Age: 43
End: 2018-09-28

## 2018-09-28 ENCOUNTER — OFFICE VISIT (OUTPATIENT)
Dept: INTERNAL MEDICINE CLINIC | Facility: CLINIC | Age: 43
End: 2018-09-28
Payer: COMMERCIAL

## 2018-09-28 VITALS
DIASTOLIC BLOOD PRESSURE: 88 MMHG | BODY MASS INDEX: 34.16 KG/M2 | WEIGHT: 244 LBS | HEART RATE: 92 BPM | RESPIRATION RATE: 12 BRPM | SYSTOLIC BLOOD PRESSURE: 136 MMHG | HEIGHT: 71 IN | OXYGEN SATURATION: 98 %

## 2018-09-28 DIAGNOSIS — D69.6 THROMBOCYTOPENIA (HCC): ICD-10-CM

## 2018-09-28 DIAGNOSIS — E11.51 TYPE 2 DIABETES MELLITUS WITH DIABETIC PERIPHERAL ANGIOPATHY WITHOUT GANGRENE, WITHOUT LONG-TERM CURRENT USE OF INSULIN (HCC): Chronic | ICD-10-CM

## 2018-09-28 DIAGNOSIS — R17 ELEVATED BILIRUBIN: Primary | ICD-10-CM

## 2018-09-28 LAB
LEFT EYE DIABETIC RETINOPATHY: NORMAL
LEFT EYE IMAGE QUALITY: NORMAL
RIGHT EYE DIABETIC RETINOPATHY: NORMAL
RIGHT EYE IMAGE QUALITY: NORMAL
SEVERITY (EYE EXAM): NORMAL

## 2018-09-28 PROCEDURE — 92250 FUNDUS PHOTOGRAPHY W/I&R: CPT | Performed by: INTERNAL MEDICINE

## 2018-09-28 PROCEDURE — 3072F LOW RISK FOR RETINOPATHY: CPT | Performed by: INTERNAL MEDICINE

## 2018-09-28 PROCEDURE — 99214 OFFICE O/P EST MOD 30 MIN: CPT | Performed by: INTERNAL MEDICINE

## 2018-09-28 PROCEDURE — 3008F BODY MASS INDEX DOCD: CPT | Performed by: INTERNAL MEDICINE

## 2018-09-28 NOTE — TELEPHONE ENCOUNTER
----- Message from Benjamin George DO sent at 9/28/2018  3:45 PM EDT -----  Please let patient know there was no evidence of diabetic retinopathy on his eye exam from our office

## 2018-09-28 NOTE — PROGRESS NOTES
INTERNAL MEDICINE FOLLOW-UP OFFICE VISIT  [facility] - [facility] [address] [place] [address]    NAME: Ramona Meléndez  AGE: 41 y o  SEX: male  : 1975     DATE: 2018     Assessment and Plan:     1  Elevated bilirubin  2  Thrombocytopenia (HCC)    Previous hepatitis panel was negative  Abnormalities are mild and he was really afflicted with viral illness  Will check US liver  Weight loss advised  Discussed concern for his obesity and risk of development of BLAND/Fatty Liver -> cirrhosis  - US liver; Future    3  Type 2 diabetes mellitus with diabetic peripheral angiopathy without gangrene, without long-term current use of insulin (HCC)    Diabetes control is excellent  Will check eye exam in office today while he is here  - POCT diabetic eye exam     Chief Complaint:     Chief Complaint   Patient presents with   [facility]     Neurologist told him labs were a little abnormal      History of Present Illness:     Patient was told by neurologist that labs were abnormal - bilirubin slightly elevated and platelets low  Has had borderline low platelets in the past in reviewing lab work from [place]  Previous hepatitis negative  He has no symptoms  Is getting over a cold  No jaundice  He has underlying diabetes and obesity  Diabetes control is excellent and A1C is in the normal range  The following portions of the patient's history were reviewed and updated as appropriate: allergies, current medications, past family history, past medical history, past social history, past surgical history and problem list      Review of Systems:     Review of Systems   Constitutional: Negative for activity change, appetite change and fatigue  Respiratory: Negative for apnea, cough, chest tightness, shortness of breath and wheezing  Cardiovascular: Negative for chest pain, palpitations and leg swelling     Gastrointestinal: Negative for abdominal distention, abdominal pain, blood in stool, constipation, diarrhea, nausea and vomiting  Psychiatric/Behavioral: Negative for behavioral problems, confusion, hallucinations, sleep disturbance and suicidal ideas  The patient is not nervous/anxious  Problem List:     Patient Active Problem List   Diagnosis    Essential hypertension    Hemispheric carotid artery syndrome    Insomnia    Nonbacterial thrombotic endocarditis    S/P mitral valve repair    Type 2 diabetes mellitus with diabetic peripheral angiopathy without gangrene, without long-term current use of insulin (HCC)    History of CVA (cerebrovascular accident)    Obesity (BMI 30-39  9)      Objective:     /88   Pulse 92   Resp 12   Ht 5' 11" (1 803 m)   Wt 111 kg (244 lb)   SpO2 98%   BMI 34 03 kg/m²     Physical Exam   Constitutional: He appears well-developed and well-nourished  No distress  Cardiovascular: Normal rate, regular rhythm, normal heart sounds and intact distal pulses  Exam reveals no gallop and no friction rub  No murmur heard  Pulmonary/Chest: Effort normal and breath sounds normal  No respiratory distress  He has no wheezes  He has no rales  He exhibits no tenderness  Abdominal: Soft  Bowel sounds are normal  He exhibits no distension and no mass  There is no tenderness  There is no rebound and no guarding  Skin: He is not diaphoretic  Vitals reviewed      Pertinent Laboratory/Diagnostic Studies:    Laboratory Results: I have personally reviewed the pertinent laboratory results/reports     CBC:   Results from last 6 Months  Lab Units 09/25/18  1440   WBC Thousand/uL 10 09   RBC Million/uL 5 67*   HEMOGLOBIN g/dL 15 7   HEMATOCRIT % 49 2   MCV fL 87   MCH pg 27 7   MCHC g/dL 31 9   RDW % 14 0   MPV fL 13 5*   PLATELETS Thousands/uL 135*     Chemistry Profile:   Results from last 6 Months  Lab Units 09/25/18  1440   SODIUM mmol/L 140   POTASSIUM mmol/L 4 0   CHLORIDE mmol/L 108   CO2 mmol/L 28   BUN mg/dL 9   CREATININE mg/dL 1 39*   GLUCOSE FASTING mg/dL 97 CALCIUM mg/dL 9 0   AST U/L 20   ALT U/L 31   ALK PHOS U/L 67   EGFR ml/min/1 73sq m 72      Current Medications:     Outpatient Prescriptions Marked as Taking for the 9/28/18 encounter (Office Visit) with David Vazquez DO   Medication    amLODIPine (NORVASC) 5 mg tablet    betamethasone dipropionate (DIPROSONE) 0 05 % ointment    fluticasone (FLONASE) 50 mcg/act nasal spray    Lancets (FREESTYLE) lancets    lisinopril (ZESTRIL) 40 mg tablet    NIFEdipine ER (ADALAT CC) 60 MG 24 hr tablet    sitaGLIPtin (JANUVIA) 100 mg tablet    triamcinolone (KENALOG) 0 1 % cream       David Vazquez DO  [facility] On license of [facility] ([place], [place])

## 2018-10-31 ENCOUNTER — TELEPHONE (OUTPATIENT)
Dept: NEUROLOGY | Facility: CLINIC | Age: 43
End: 2018-10-31

## 2018-10-31 ENCOUNTER — HOSPITAL ENCOUNTER (OUTPATIENT)
Dept: NEUROLOGY | Facility: HOSPITAL | Age: 43
Discharge: HOME | End: 2018-10-31
Attending: PSYCHIATRY & NEUROLOGY
Payer: COMMERCIAL

## 2018-10-31 ENCOUNTER — APPOINTMENT (OUTPATIENT)
Dept: MRI IMAGING | Facility: HOSPITAL | Age: 43
End: 2018-10-31
Attending: PSYCHIATRY & NEUROLOGY
Payer: COMMERCIAL

## 2018-10-31 DIAGNOSIS — G40.909 SEIZURE DISORDER (HCC): ICD-10-CM

## 2018-10-31 PROCEDURE — 95816 EEG AWAKE AND DROWSY: CPT

## 2018-10-31 PROCEDURE — 95816 EEG AWAKE AND DROWSY: CPT | Performed by: PSYCHIATRY & NEUROLOGY

## 2018-10-31 NOTE — TELEPHONE ENCOUNTER
Spoke to patient and told him that the MRI was approved from his PCP and he is going to call them and call us back to let us know if that is still valid

## 2018-10-31 NOTE — TELEPHONE ENCOUNTER
Pt left 2 messages requesting cb to discuss denied MRI  States per insurance, office did not submit paperwork

## 2018-10-31 NOTE — TELEPHONE ENCOUNTER
Patient called back and got the auth # and dates from PCP and is calling central scheduling to schedule the test

## 2018-11-02 ENCOUNTER — TRANSCRIBE ORDERS (OUTPATIENT)
Dept: ADMINISTRATIVE | Facility: HOSPITAL | Age: 43
End: 2018-11-02

## 2018-11-02 DIAGNOSIS — G40.909 NONINTRACTABLE EPILEPSY WITHOUT STATUS EPILEPTICUS, UNSPECIFIED EPILEPSY TYPE (HCC): Primary | ICD-10-CM

## 2018-11-05 ENCOUNTER — TELEPHONE (OUTPATIENT)
Dept: INTERNAL MEDICINE CLINIC | Facility: CLINIC | Age: 43
End: 2018-11-05

## 2018-11-08 ENCOUNTER — OFFICE VISIT (OUTPATIENT)
Dept: NEUROLOGY | Facility: CLINIC | Age: 43
End: 2018-11-08
Payer: COMMERCIAL

## 2018-11-08 VITALS
WEIGHT: 244 LBS | HEART RATE: 84 BPM | SYSTOLIC BLOOD PRESSURE: 140 MMHG | BODY MASS INDEX: 34.16 KG/M2 | HEIGHT: 71 IN | DIASTOLIC BLOOD PRESSURE: 108 MMHG

## 2018-11-08 DIAGNOSIS — Z86.73 HISTORY OF CVA (CEREBROVASCULAR ACCIDENT): Primary | Chronic | ICD-10-CM

## 2018-11-08 DIAGNOSIS — G40.909 SEIZURE DISORDER (HCC): ICD-10-CM

## 2018-11-08 PROCEDURE — 99214 OFFICE O/P EST MOD 30 MIN: CPT | Performed by: PSYCHIATRY & NEUROLOGY

## 2018-11-08 NOTE — PROGRESS NOTES
Jesus Werner is a 41 y o  male  Chief Complaint   Patient presents with    Seizures       Assessment:  1  History of CVA (cerebrovascular accident)    2  Seizure disorder [facility])        Plan:    Discussion:  Differential diagnosis discussed with the patient including possible complex partial seizure, versus his history of non epileptiform seizure secondary to stress, I discussed with him different medication options, patient does not want to go on any medications until he sees the epilepsy specialist and discuss with him, he was advised to continue with seizure precautions, his routine EEG was normal, would recommend a sleep-deprived EEG, he is scheduled to have an MRI scan of the brain in a few days, he will call me after the test to discuss the results, also was advised to discuss with his cardiologist and family physician regarding a baby aspirin given his history of TIA/CVA, stroke education given to the patient, he was advised to keep his blood pressure cholesterol and sugar under control, to go to the hospital if has any worsening symptoms and call me, to be under constant supervision, he does not drive, and to see me back in 3 months and follow up with his other physicians      Subjective:    HPI   Patient is here in follow-up for his history of seizures, and history of TIA/CVA, according to the patient he has history of seizures since 2004 after he was electrocuted, most of the time his events were while sleeping when he would wake up he would feel confused for a few minutes, 5 he has been off the [address] now for almost more than a year, he he was also told that he has non epileptiform seizures, he has had history of TIA with right-sided numbness and has had a negative JULIA and an MRA Scan and was on aspirin, since his last visit he had an EEG that was normal, he had 1 episode that he woke up in the morning and fell confused for a couple of minutes but then he was fine, he did not have any tongue biting no bowel and bladder incontinence, she he has a history of a frontal and parietal stroke with an atrial myxoma resection, currently he is asymptomatic, no other complaints      Vitals:    11/08/18 1201   BP: (!) 140/108   BP Location: Left arm   Patient Position: Sitting   Cuff Size: Large   Pulse: 84   Weight: 111 kg (244 lb)   Height: 5' 11" (1 803 m)       Current Medications    Current Outpatient Prescriptions:     amLODIPine (NORVASC) 5 mg tablet, Take 5 mg by mouth daily, Disp: , Rfl: 5    betamethasone dipropionate (DIPROSONE) 0 05 % ointment, APPLY TO AREAS OF PSORIASIS ON BODY(AVIOD FACE)ONCE DAILY 3 TO 4 TIMES A WEEK AS NEEDED FOR FLARE UP, Disp: , Rfl: 2    fluticasone (FLONASE) 50 mcg/act nasal spray, USE 1 SPRAY IN EACH NOSTRIL ONCE OR TWICE A DAY, Disp: , Rfl: 3    Lancets (FREESTYLE) lancets, Use as instructed, Disp: 100 each, Rfl: 0    lisinopril (ZESTRIL) 40 mg tablet, Take 40 mg by mouth, Disp: , Rfl:     NIFEdipine ER (ADALAT CC) 60 MG 24 hr tablet, Take 60 mg by mouth daily, Disp: , Rfl: 3    sitaGLIPtin (JANUVIA) 100 mg tablet, Take 100 mg by mouth daily, Disp: , Rfl:     triamcinolone (KENALOG) 0 1 % cream, APPLY TO AREAS OF PSORIASIS ON FACE AND SKIN FOLDS ONCE DAILY 3-4 DAYS A WEEK AS NEEDED FOR FLARES, Disp: , Rfl: 3      Allergies  Cat hair extract and Metformin    Past Medical History  Past Medical History:   Diagnosis Date    Diabetes mellitus ([facility] [address] )     Hypertension     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, but path consistent with thrombotic fibrotic mass, marantic endocarditis    Seizure (HCC)     possible, unclear history    TIA (transient ischemic attack)     Possible         Past Surgical History:  Past Surgical History:   Procedure Laterality Date    CARDIAC SURGERY      ROTATOR CUFF REPAIR           Family History:  Family History   Problem Relation Age of Onset    Heart attack Mother     Breast cancer Maternal Grandmother     Cancer Paternal Grandfather Social History:   reports that he has quit smoking  His smoking use included Cigarettes  He started smoking about 22 years ago  He has never used smokeless tobacco  He reports that he drinks alcohol  He reports that he uses drugs, including Marijuana  I have reviewed the past medical history, surgical history, social and family history, current medications, allergies vitals, review of systems, and updated this information as appropriate today  Objective:    Physical Exam    Neurological Exam    GENERAL:  Cooperative in no acute distress  Well-developed and well-nourished    HEAD and NECK   Head is atraumatic normocephalic with no lesions or masses  Neck is supple with full range of motion    CARDIOVASCULAR  Carotid Arteries-no carotid bruits  NEUROLOGIC:  Mental Status-the patient is awake alert and oriented without aphasia or apraxia  Cranial Nerves: Visual fields are full to confrontation  Extraocular movements are full without nystagmus  Pupils are 2-1/2 mm and reactive  Face is symmetrical to light touch  Movements of facial expression move symmetrically  Hearing is normal to finger rub bilaterally  Soft palate lifts symmetrically  Shoulder shrug is symmetrical  Tongue is midline without atrophy  Motor: No drift is noted on arm extension  Strength is full in the upper and lower extremities with normal bulk and tone  Sensory: Intact to temperature and vibratory sensation in the upper and lower extremities bilaterally  Cortical function is intact  Coordination: Finger to nose testing is performed accurately  Gait reveals a normal base with symmetrical arm swing  Reflexes:     1+ and symmetrical           ROS:  Review of Systems   Constitutional: Positive for fatigue  Negative for appetite change and fever  HENT: Negative  Negative for hearing loss, tinnitus, trouble swallowing and voice change  Eyes: Negative  Negative for photophobia, pain and visual disturbance     Respiratory: Negative  Negative for shortness of breath  Cardiovascular: Negative  Negative for chest pain and palpitations  Gastrointestinal: Negative  Negative for abdominal pain, nausea and vomiting  Endocrine: Negative  Negative for cold intolerance and heat intolerance  Genitourinary: Negative  Negative for dysuria, frequency and urgency  Musculoskeletal: Positive for back pain, gait problem and neck pain  Negative for myalgias  Skin: Negative  Negative for rash  Neurological: Positive for dizziness, seizures, syncope, numbness and headaches  Negative for tremors, facial asymmetry, speech difficulty, weakness and light-headedness  Right facial numbness     Hematological: Negative  Does not bruise/bleed easily  Psychiatric/Behavioral: Positive for confusion, decreased concentration and sleep disturbance  Negative for hallucinations       ESS= 0

## 2018-11-12 ENCOUNTER — TELEPHONE (OUTPATIENT)
Dept: NEUROLOGY | Facility: CLINIC | Age: 43
End: 2018-11-12

## 2018-11-12 NOTE — TELEPHONE ENCOUNTER
Henrique Padgett, patient called today regarding his disability form, he left in the office to be completed, please if you can let him know when it will be finished  He said he have till the end of the month  His phone number is [phone]    Thank you!!

## 2018-11-13 NOTE — TELEPHONE ENCOUNTER
Form was signed by Dr Devi Zaragoza spoke with the patient this morning  Payment is pending  Once paid form will be scanned into chart

## 2018-11-14 ENCOUNTER — TELEPHONE (OUTPATIENT)
Dept: INTERNAL MEDICINE CLINIC | Facility: CLINIC | Age: 43
End: 2018-11-14

## 2018-11-14 ENCOUNTER — HOSPITAL ENCOUNTER (OUTPATIENT)
Dept: MRI IMAGING | Facility: HOSPITAL | Age: 43
Discharge: HOME | End: 2018-11-14
Attending: INTERNAL MEDICINE
Payer: COMMERCIAL

## 2018-11-14 DIAGNOSIS — G40.909 NONINTRACTABLE EPILEPSY WITHOUT STATUS EPILEPTICUS, UNSPECIFIED EPILEPSY TYPE (HCC): ICD-10-CM

## 2018-11-14 PROCEDURE — 70551 MRI BRAIN STEM W/O DYE: CPT

## 2018-11-14 NOTE — TELEPHONE ENCOUNTER
[facility] has questions on the MRI Brain IAC w/o contrast    Question Seizures IAC   Usually with contrast  Call Pamela Merrill [phone]    Pt appt is tonight 11/14

## 2018-11-15 ENCOUNTER — TELEPHONE (OUTPATIENT)
Dept: INTERNAL MEDICINE CLINIC | Facility: CLINIC | Age: 43
End: 2018-11-15

## 2018-11-15 NOTE — TELEPHONE ENCOUNTER
----- Message from Katt Carrasco DO sent at 11/15/2018 11:07 AM EST -----  Call patient and let him know that MRI was normal

## 2018-12-10 RX ORDER — INSULIN LISPRO 100 [IU]/ML
INJECTION, SOLUTION INTRAVENOUS; SUBCUTANEOUS
COMMUNITY
End: 2019-04-16 | Stop reason: CLARIF

## 2018-12-10 RX ORDER — FLUOCINONIDE TOPICAL SOLUTION USP, 0.05% 0.5 MG/ML
SOLUTION TOPICAL
Status: ON HOLD | COMMUNITY
Start: 2018-05-22 | End: 2021-06-30

## 2018-12-10 RX ORDER — ASPIRIN 325 MG
325 TABLET ORAL
COMMUNITY
End: 2019-01-22

## 2018-12-10 RX ORDER — HYDROCHLOROTHIAZIDE 25 MG/1
TABLET ORAL
COMMUNITY
Start: 2018-03-16 | End: 2019-01-22

## 2018-12-11 ENCOUNTER — OFFICE VISIT (OUTPATIENT)
Dept: INTERNAL MEDICINE CLINIC | Facility: CLINIC | Age: 43
End: 2018-12-11
Payer: COMMERCIAL

## 2018-12-11 VITALS
OXYGEN SATURATION: 97 % | HEIGHT: 71 IN | BODY MASS INDEX: 35.5 KG/M2 | DIASTOLIC BLOOD PRESSURE: 80 MMHG | WEIGHT: 253.6 LBS | HEART RATE: 94 BPM | SYSTOLIC BLOOD PRESSURE: 122 MMHG

## 2018-12-11 DIAGNOSIS — D69.6 THROMBOCYTOPENIA (HCC): ICD-10-CM

## 2018-12-11 DIAGNOSIS — E66.01 SEVERE OBESITY (BMI 35.0-39.9) WITH COMORBIDITY (HCC): ICD-10-CM

## 2018-12-11 DIAGNOSIS — I10 ESSENTIAL HYPERTENSION: ICD-10-CM

## 2018-12-11 DIAGNOSIS — Z71.3 DIETARY COUNSELING AND SURVEILLANCE: ICD-10-CM

## 2018-12-11 DIAGNOSIS — E11.51 TYPE 2 DIABETES MELLITUS WITH DIABETIC PERIPHERAL ANGIOPATHY WITHOUT GANGRENE, WITHOUT LONG-TERM CURRENT USE OF INSULIN (HCC): Primary | Chronic | ICD-10-CM

## 2018-12-11 DIAGNOSIS — L40.9 PSORIASIS: ICD-10-CM

## 2018-12-11 PROCEDURE — 3074F SYST BP LT 130 MM HG: CPT | Performed by: INTERNAL MEDICINE

## 2018-12-11 PROCEDURE — 1036F TOBACCO NON-USER: CPT | Performed by: INTERNAL MEDICINE

## 2018-12-11 PROCEDURE — 3008F BODY MASS INDEX DOCD: CPT | Performed by: INTERNAL MEDICINE

## 2018-12-11 PROCEDURE — 3079F DIAST BP 80-89 MM HG: CPT | Performed by: INTERNAL MEDICINE

## 2018-12-11 PROCEDURE — 4010F ACE/ARB THERAPY RXD/TAKEN: CPT | Performed by: INTERNAL MEDICINE

## 2018-12-11 PROCEDURE — 99214 OFFICE O/P EST MOD 30 MIN: CPT | Performed by: INTERNAL MEDICINE

## 2018-12-11 RX ORDER — CALCIPOTRIENE 50 UG/G
CREAM TOPICAL
Refills: 3 | Status: ON HOLD | COMMUNITY
Start: 2018-11-27 | End: 2021-06-30

## 2018-12-11 RX ORDER — LISINOPRIL 40 MG/1
40 TABLET ORAL DAILY
Qty: 90 TABLET | Refills: 1 | Status: SHIPPED | OUTPATIENT
Start: 2018-12-11 | End: 2019-06-16 | Stop reason: SDUPTHER

## 2018-12-11 NOTE — PATIENT INSTRUCTIONS
Psoriasis   WHAT YOU NEED TO KNOW:   What is psoriasis? Psoriasis is a long-term skin disease in which the skin cells grow faster than normal  This abnormal growth causes a buildup of cells on the surface of the skin  Red, raised patches that are covered with silver-colored scales form on your skin  What causes psoriasis? The exact cause of psoriasis is not known  A problem with the immune system sometimes causes your body to attack healthy skin cells  Psoriasis is more likely to occur if another family member also has psoriasis  Flare-ups of psoriasis come and go and are often caused by certain triggers  · Infections:  Germs, such as bacteria, viruses, or fungi, may trigger a flare-up  A flare-up of psoriasis usually follows a sore throat  · Medicines:  Certain medicines, such as those used to treat high blood pressure or depression, may trigger a psoriasis flare-up  · Skin damage:  Skin injuries, such as a cut or scrape, or a sunburn may increase your risk of a flare-up  · Smoking and alcohol:  Smoking and drinking alcohol may also increase your risk  · Stress: Both physical and emotional stress may lead to a flare-up of psoriasis  What are the signs and symptoms of psoriasis? The signs and symptoms usually depend on the type of psoriasis you have  · Plaque type: This is the most common and mildest type of psoriasis  Plaques are reddened patches covered with silver-colored scales  Your knees, elbows, scalp, stomach, and back are usually affected  You may also have nail changes, such as pitting, thickening, or lifting of the nails off the nail bed  · Guttate type: This type is the most common among children and young adults  It usually happens after a sore throat or other infections  This type looks like red, raised, pea-sized drops on your skin  · Inverse type: The plaques appear as smooth red patches and are often found in the moist areas of your body   It affects skin in the armpits, groin, under breasts, and around the genitals  · Erythrodermic type: This is a rare and severe type of psoriasis in which plaques cover large areas of the skin  These areas itch and are painful  · Pustular type:  Pustules (blisters with pus inside) or pimple-like lesions may appear on large red areas of the skin  Sometimes this type is limited to the palms of the hands and soles of the feet  · Psoriatic arthritis:  Some people who have psoriasis may also develop psoriatic arthritis  Psoriatic arthritis makes your joints swollen and painful  You may also have nail changes, such as pitting, thickening, or lifting of the nails off the nail bed  How is psoriasis diagnosed? Your healthcare provider will ask about your health history  He may also want to know if you have other family members who have psoriasis  Psoriasis is usually diagnosed after a careful examination of your skin, scalp, and nails  Blood tests and x-rays may also be needed  How is psoriasis treated? Treatment usually depends on the severity of the disease, size of the areas involved, and the type of psoriasis  · Topical medicine: These medicines are ointments, creams, and pastes that are applied on the skin  ¨ Moisturizers: These soothe your skin by keeping it moist and preventing dryness  ¨ Steroids: This medicine may be given to decrease inflammation  ¨ Vitamin D and retinoids: These are vitamin-based creams that are used to clear plaques  ¨ Anthralin:  This medicine decreases swelling and excess skin cells that form scales  ¨ Salicylic acid: This peeling agent helps decrease scaling of the skin and scalp  ¨ Tar preparations: These medicines decrease itching, scaling, and inflammation  They may be shampoos, creams, or bath oils  · Oral medicine: These medicines are used to treat serious types of psoriasis and are taken by mouth  They include steroids or retinoids   They may also include medicines that decrease the rate of growth of your skin cells or that affect your immune system  · Phototherapy:  You may need ultraviolet (UV) light treatments if your psoriasis is severe  Your skin is exposed to UV light for the period of time that your healthcare provider prescribes  What are the risks of psoriasis? Certain medicines used to treat psoriasis can cause burning, redness, and irritation of your skin  They can also cause drowsiness, high blood pressure, or birth defects  Without treatment, your signs and symptoms may worsen  Psoriasis may cause severe itching, swelling, and infection  You may also bleed more easily  How can I manage my psoriasis? · Take care of your skin:  Apply emollients, lubricants, or moisturizing creams to your skin regularly  Apply these while your skin is still damp when you bathe  Stop using them if they sting or irritate your skin  Use mild soaps and add bath oils to soothe your skin when you bathe  · Protect your skin from sun exposure:  When you get sun exposure for short periods of time, it can help your psoriasis  Too much sun exposure or a sunburn can make your psoriasis worse  Talk to your dermatologist or healthcare provider about how much sun exposure is right for you  · Manage stress:  Stress can trigger a flare-up  Find healthy ways to manage stress, such as deep breathing or meditation  · Watch for symptoms with new medicines or herbal supplements:  Some medicines, including herbal supplements, may trigger a psoriasis flare-up  Ask if any of the medicines you take may be making your psoriasis worse  Always check for skin changes when you take your medicines  · Do not smoke:  Smoking can trigger a flare-up of psoriasis  If you smoke, it is never too late to quit  Ask for information about how to stop  · Avoid triggers:  Injury to the skin, cold weather, and heavy alcohol use are other things that can trigger psoriasis flare-ups    When should I contact my healthcare provider? · You get pregnant  · You have a fever  · Your skin plaques are not getting better or are getting worse  · You cannot sleep because your skin itches  · Your skin plaques have pus draining from them or they have soft yellow scabs  · You have questions or concerns about your condition or care  When should I seek immediate care? · Psoriasis suddenly covers larger areas of your body and becomes more painful  CARE AGREEMENT:   You have the right to help plan your care  Learn about your health condition and how it may be treated  Discuss treatment options with your caregivers to decide what care you want to receive  You always have the right to refuse treatment  The above information is an  only  It is not intended as medical advice for individual conditions or treatments  Talk to your doctor, nurse or pharmacist before following any medical regimen to see if it is safe and effective for you  © 2017 [address] [employer] Information is for End User's use only and may not be sold, redistributed or otherwise used for commercial purposes  All illustrations and images included in CareNotes® are the copyrighted property of [employer]  or [employer]

## 2018-12-28 ENCOUNTER — HOSPITAL ENCOUNTER (OUTPATIENT)
Dept: NEUROLOGY | Facility: HOSPITAL | Age: 43
Discharge: HOME | End: 2018-12-28
Attending: PSYCHIATRY & NEUROLOGY
Payer: COMMERCIAL

## 2018-12-28 DIAGNOSIS — G40.909 SEIZURE DISORDER (HCC): ICD-10-CM

## 2018-12-28 PROCEDURE — 95819 EEG AWAKE AND ASLEEP: CPT

## 2018-12-28 PROCEDURE — 95816 EEG AWAKE AND DROWSY: CPT | Performed by: PSYCHIATRY & NEUROLOGY

## 2019-01-17 NOTE — PROGRESS NOTES
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, who is presenting to Neurology office for evaluation of seizures  Assessment/Plan:    Epilepsy ([facility] [address] )  Based on the description of his events, I am concerned that his smaller episodes represent complex partial seizures, with his larger episodes representing generalized tonic-clonic seizures  The description of his larger events are very concerning for generalized tonic-clonic seizures, especially since he had significant injury with this in the past   He does have a prior history of a left frontal infarction, which could potentially be a source of seizures  Although acknowledges that his episodes may be nonepileptic, given his significant injury I think would be best for him to be on a seizure medication going forward  Certainly his most recent MRI was unremarkable, and his to recent EEGs were normal, but these do not exclude the possibility of epilepsy  --he previously tolerated levetiracetam without any side effects  Is unclear what his prior dose was, but I will have him restart taking levetiracetam 500 milligrams twice a day, and increase after 1 week to 1000 milligrams twice a day  If he would have additional events at this dose, it could be increased further  --I do not think any additional imaging or EEGs would be of much benefit at this point since he has already had to unremarkable EEGs  If he continues to have episodes despite maximally tolerated doses of levetiracetam, but it may be better to have him admitted to the epilepsy monitoring unit to better capture and characterize his spells  --I discussed seizure first aid, seizure safety, and driving restrictions in [place]  He currently does not drive  History of CVA (cerebrovascular accident)  He has a reported history of a prior stroke, which is of unclear etiology    This occurred after his mitral valve fibrotic mass was removed in 2015, and repeat transesophageal echocardiogram did not show any recurrent vegetation or mass  Although I did not see any evidence of a stroke on his most recent MRI, I would like to get additional records possibly with his prior MRI images to confirm the presence of a left frontal infarction  I spent a total of 40 min with the patient with greater than 50% of that time spent counseling and coordinating his care, specifically discussing his diagnosis, medications, and plan, as detailed above     He will return to the office in about 3 months  Subjective:    HPI  Current medications as per Epic    Briefly reviewing his history, in 2004, he was walking in [place] and had an episode where he felt a shock, followed by trouble with function on the right side of his body and difficulty talking  He felt very weird after this happened, he was taken to [facility] hospital, where it was felt that he likely had an electrocution injury from stepping on a manhole cover  After that, he had significant trouble feeling off balance, difficulty focusing, and overall not feeling correct  He denies any discrete episodes around this time, other than a consistent overall feeling  He was eventually seen by a neurologist for the symptoms, and was started on levetiracetam   He felt that with the addition of this, the symptoms gradually went away  He started having more discrete episodes in 2013  With his first episode, he was at work, was talking to coworkers, and became blank, was staring off, and not responsive  At some point, his levetiracetam was stopped, and in 2014, he had a larger seizure out of sleep  With this episode he became stiff all over, which shaking, fell out of bed and actually tore his left rotator cuff    He awoke in the hospital and was placed on phenytoin, but this was later changed by his PCP to levetiracetam   Despite being on levetiracetam he was still having occasional episodes both of staring and of presumed generalized tonic-clonic events at night  He is not sure what dose he was on of levetiracetam, but denies any side effects to this medication  On 12/13/2016, he had a stroke  He was at work, and was noted by his coworkers have difficulty talking, difficulty with the right side of his body  He was taken to a hospital in [place], and was found to have a left frontal infarction on MRI  Because of his prior history of a mitral valve mass versus thrombus that was removed in 2015, he was evaluated with an extensive evaluation including a transesophageal echocardiogram, which did not show any further evidence of cardiac mass  Since then, he has continued to have episodes of nighttime the presumed generalized tonic-clonic seizures and smaller episodes where he will blank out his blackout episodes are more common at night, but have occurred during the day  He recalls having 1 while at a swim park with his children and being in a wave pool  Fortunately there were people near him were able to keep him out of the water  Event/Seizure semiology:  1  Presumed generalized tonic-clonic seizure: These occur predominantly out of sleep  He will appear to awaken, become stiff all over have shaking all over which will last for few minutes  He has injured himself with a left rotator cuff injury with 1 of these events in the past   Unclear duration  Last 1 occurred around 2016  2  Blank stares: These also typically occur out of sleep, but have happened during the day  He typically will sit up, stare blankly, possibly with some repetitive movements and twitching of his mouth  This last for 30-60 seconds  These typically occur once every few months  3  He reports third episode fell, but these appear to be consistent with his blank stare spells, but occur during the day and are associated with some mild limb shaking      Special Features  Status epilepticus:  No  Self Injury Seizures:  Yes, torn left rotator cuff  Precipitating Factors:  None    Epilepsy Risk Factors:  Uncomplicated pregnancy with normal development  No learning disabilities or cognitive delay  No h/o febrile seizures, CNS infections, or CNS neoplasms  There is no family history of seizures or epilepsy  Prior AEDs:  Phenytoin (quickly changed to levetiracetam), levetiracetam (changed for unclear reasons)    Prior Evaluation:  - MRI brain:  2018:  Normal  - Routine EEG:  10/31/2018:  Normal  -sleep-deprived EE2018: Abnormal  - Video EEG:  None     Psychiatric History:  Depression:  No  Anxiety:  No  Psychosis:  No  Psychiatric Admissions:  None    I personally reviewed his MRI from 2018, and agree that it appears normal   There was no evidence of a left frontal infarction, which was previously reported, as detailed above  I reviewed prior notes including notes from her neurology visits, as documented in [employer], and summarized above  The following portions of the patient's history were reviewed and updated as appropriate: allergies, current medications, past family history, past medical history, past social history, past surgical history and problem list       Objective:    Blood pressure 146/90, pulse 60, height 5' 11" (1 803 m), weight 110 kg (243 lb)  Physical Exam    Neurological Exam  GENERAL EXAMINATION:   In general patient is well appearing and in no distress  There is no peripheral edema  NEUROLOGIC EXAMINATION:     Alert and oriented to person, date, location  Fund of knowledge is full with good understanding of medical situation  Recent and remote memory were intact    Mood and affect are appropriate  Attention is intact  Language function including fluency, naming, and comprehension intact  Cranial nerves: Pupils are equal round reactive to light and accommodation  Visual Fields are full to confrontation bilaterally  Optic discs are sharp with no evidence of papilledema Extraocular movements are intact without nystagmus  Facial sensation is intact to light touch  No facial droop, face activates symmetrically  There is no dysarthria  Hearing was intact to finger rub  Tongue and uvula are midline and palate elevates symmetrically  Shoulder shrug  5/5  Motor Exam:  No pronator drift  Bulk and tone are normal  Strength is 5/5 throughout  Deep tendon reflexes: Biceps 2+, brachioradialis 2+, patellar 2+, Achilles 2+ bilaterally  Negative Navas      Sensation: Intact light touch    Coordination: Finger nose finger and heel to shin testing are without dysmetria  Gait: Negative romberg  Normal casual gait  ROS:    Review of Systems   Constitutional: Negative  Negative for appetite change and fever  HENT: Positive for congestion  Negative for hearing loss, tinnitus, trouble swallowing and voice change  Sinus problem   Eyes: Negative  Negative for photophobia and pain  Respiratory: Negative  Negative for shortness of breath  Cardiovascular: Negative  Negative for palpitations  Gastrointestinal: Negative  Negative for nausea and vomiting  Endocrine: Negative  Negative for cold intolerance and heat intolerance  Genitourinary: Negative  Negative for dysuria, frequency and urgency  Musculoskeletal: Negative  Negative for myalgias and neck pain  Skin: Positive for rash  Neurological: Positive for speech difficulty, light-headedness and numbness  Negative for dizziness, tremors, seizures, syncope, facial asymmetry, weakness and headaches  Blacked out  Waking up at night  Memory problem trouble falling asleep  Double vision  Taste or smell changes  Snoring balance problem   Hematological: Negative  Does not bruise/bleed easily  Psychiatric/Behavioral: Negative  Negative for confusion, hallucinations and sleep disturbance           I personally reviewed the ROS that was entered by the medical assistant

## 2019-01-22 ENCOUNTER — OFFICE VISIT (OUTPATIENT)
Dept: NEUROLOGY | Facility: CLINIC | Age: 44
End: 2019-01-22
Payer: COMMERCIAL

## 2019-01-22 VITALS
DIASTOLIC BLOOD PRESSURE: 90 MMHG | SYSTOLIC BLOOD PRESSURE: 146 MMHG | HEART RATE: 60 BPM | BODY MASS INDEX: 34.02 KG/M2 | WEIGHT: 243 LBS | HEIGHT: 71 IN

## 2019-01-22 DIAGNOSIS — I38 NONBACTERIAL THROMBOTIC ENDOCARDITIS: ICD-10-CM

## 2019-01-22 DIAGNOSIS — Z86.73 HISTORY OF CVA (CEREBROVASCULAR ACCIDENT): Chronic | ICD-10-CM

## 2019-01-22 DIAGNOSIS — G40.909 NONINTRACTABLE EPILEPSY WITHOUT STATUS EPILEPTICUS, UNSPECIFIED EPILEPSY TYPE (HCC): Primary | ICD-10-CM

## 2019-01-22 PROBLEM — L30.9 ECZEMA: Status: ACTIVE | Noted: 2019-01-22

## 2019-01-22 PROCEDURE — 99215 OFFICE O/P EST HI 40 MIN: CPT | Performed by: PSYCHIATRY & NEUROLOGY

## 2019-01-22 RX ORDER — LEVETIRACETAM 500 MG/1
TABLET ORAL
Qty: 120 TABLET | Refills: 11 | Status: SHIPPED | OUTPATIENT
Start: 2019-01-22 | End: 2019-08-08 | Stop reason: SDUPTHER

## 2019-01-22 NOTE — PATIENT INSTRUCTIONS
-- Please start taking Levetiracetam 500 mg twice a day for 1 week, then take Levetiracetam 1000 mg twice a day  -- Please call our problems if any problems arise

## 2019-01-22 NOTE — ASSESSMENT & PLAN NOTE
He has a reported history of a prior stroke, which is of unclear etiology  This occurred after his mitral valve fibrotic mass was removed in 2015, and repeat transesophageal echocardiogram did not show any recurrent vegetation or mass  Although I did not see any evidence of a stroke on his most recent MRI, I would like to get additional records possibly with his prior MRI images to confirm the presence of a left frontal infarction

## 2019-01-22 NOTE — ASSESSMENT & PLAN NOTE
Based on the description of his events, I am concerned that his smaller episodes represent complex partial seizures, with his larger episodes representing generalized tonic-clonic seizures  The description of his larger events are very concerning for generalized tonic-clonic seizures, especially since he had significant injury with this in the past   He does have a prior history of a left frontal infarction, which could potentially be a source of seizures  Although acknowledges that his episodes may be nonepileptic, given his significant injury I think would be best for him to be on a seizure medication going forward  Certainly his most recent MRI was unremarkable, and his to recent EEGs were normal, but these do not exclude the possibility of epilepsy  --he previously tolerated levetiracetam without any side effects  Is unclear what his prior dose was, but I will have him restart taking levetiracetam 500 milligrams twice a day, and increase after 1 week to 1000 milligrams twice a day  If he would have additional events at this dose, it could be increased further  --I do not think any additional imaging or EEGs would be of much benefit at this point since he has already had to unremarkable EEGs  If he continues to have episodes despite maximally tolerated doses of levetiracetam, but it may be better to have him admitted to the epilepsy monitoring unit to better capture and characterize his spells  --I discussed seizure first aid, seizure safety, and driving restrictions in [place]  He currently does not drive

## 2019-01-24 ENCOUNTER — TELEPHONE (OUTPATIENT)
Dept: NEUROLOGY | Facility: CLINIC | Age: 44
End: 2019-01-24

## 2019-01-24 NOTE — TELEPHONE ENCOUNTER
pt called and states that he was prescribed keppra 500mg bid and fells dizzy, feels dizzy all the time  bumping into doorways  starts approx 30 min after taking dose  he is supposed to increase to 1000mg bid next week  he states that he took keppra before and never felt dizzy    [phone]

## 2019-01-24 NOTE — TELEPHONE ENCOUNTER
He can slow how quickly we start it to allow his body more time to adjust  Please have him change to be taking Levetiracetam 500 mg nightly for 1 week, then take 500 mg twice a day for 1 week, then 500 mg in the morning and 1000 mg at night for 1 week, then 1000 mg twice a day  If his symptoms persist after decreasing to just 500 mg at night, he should give us a call

## 2019-02-05 NOTE — TELEPHONE ENCOUNTER
Yes, he should just continue 2 tabs twice a day  His body was likely just adjusting to the medication being new to his system, and I would expect it to get better and go away, especially if he is already feeling better now

## 2019-02-05 NOTE — TELEPHONE ENCOUNTER
Called and advised pt of all of the below  He verbalized clear understanding of all instructions  Pt states that first dose was bad, felt dizzy  As the days went on, he feels a lot better  Symptoms comes and goes  Once he's moving around, he's ok  "I feel normal now"  Pt states that he is currently taking keppra 500 mg 2 tabs in am and 2 tabs at bedtime  His symptoms is a lot better now  Asking if he should just continue taking keppra 500 mg 2 tabs bid?         [phone]

## 2019-02-13 ENCOUNTER — TELEPHONE (OUTPATIENT)
Dept: NEUROLOGY | Facility: CLINIC | Age: 44
End: 2019-02-13

## 2019-02-13 ENCOUNTER — TELEPHONE (OUTPATIENT)
Dept: INTERNAL MEDICINE CLINIC | Facility: CLINIC | Age: 44
End: 2019-02-13

## 2019-02-13 NOTE — TELEPHONE ENCOUNTER
Spoke with patient about his Physician Verification form is ready for pickup at the  and the fee is $5 00

## 2019-02-13 NOTE — TELEPHONE ENCOUNTER
[facility] wife of patient stopped by the office to pay for the [facility] forms  Payment collected a FMLA forms given to her

## 2019-02-14 NOTE — TELEPHONE ENCOUNTER
Patient called asking about social security paperwork that he needed to pay for  I called  at [facility] and spoke with EBONIE who states she asked Jose Raul Munguia and Jesusita Mendiola and they have no additional paperwork for the patient and his wife picked up all paperwork yesterday  Upon further investigation, I discussed with patient that he may actually be looking for medical records and not really a "form" (see media 2/7/19)  I provided the number for [employer] and asked patient to f/u with them and notify us if he needs anything further from our office  Patient verbalized clear understanding

## 2019-02-15 NOTE — TELEPHONE ENCOUNTER
Pt called in to ask we have his medical records ready  I did explain that we used [employer]  I can not confirm that this request was sent  I faxed the request dated in media 2/11/19 to [facility] \[facility]\""  Pt will follow up with them

## 2019-04-02 ENCOUNTER — TELEPHONE (OUTPATIENT)
Dept: NEUROLOGY | Facility: CLINIC | Age: 44
End: 2019-04-02

## 2019-04-16 ENCOUNTER — OFFICE VISIT (OUTPATIENT)
Dept: INTERNAL MEDICINE CLINIC | Facility: CLINIC | Age: 44
End: 2019-04-16
Payer: COMMERCIAL

## 2019-04-16 ENCOUNTER — APPOINTMENT (OUTPATIENT)
Dept: LAB | Facility: CLINIC | Age: 44
End: 2019-04-16
Payer: COMMERCIAL

## 2019-04-16 VITALS
HEART RATE: 83 BPM | HEIGHT: 70 IN | DIASTOLIC BLOOD PRESSURE: 88 MMHG | WEIGHT: 245.8 LBS | OXYGEN SATURATION: 96 % | SYSTOLIC BLOOD PRESSURE: 122 MMHG | BODY MASS INDEX: 35.19 KG/M2

## 2019-04-16 DIAGNOSIS — D69.6 THROMBOCYTOPENIA (HCC): ICD-10-CM

## 2019-04-16 DIAGNOSIS — E11.51 TYPE 2 DIABETES MELLITUS WITH DIABETIC PERIPHERAL ANGIOPATHY WITHOUT GANGRENE, WITHOUT LONG-TERM CURRENT USE OF INSULIN (HCC): Primary | Chronic | ICD-10-CM

## 2019-04-16 DIAGNOSIS — E66.01 CLASS 2 SEVERE OBESITY DUE TO EXCESS CALORIES WITH SERIOUS COMORBIDITY AND BODY MASS INDEX (BMI) OF 35.0 TO 35.9 IN ADULT (HCC): ICD-10-CM

## 2019-04-16 DIAGNOSIS — E11.51 TYPE 2 DIABETES MELLITUS WITH DIABETIC PERIPHERAL ANGIOPATHY WITHOUT GANGRENE, WITHOUT LONG-TERM CURRENT USE OF INSULIN (HCC): Chronic | ICD-10-CM

## 2019-04-16 DIAGNOSIS — Z86.73 HISTORY OF CVA (CEREBROVASCULAR ACCIDENT): Chronic | ICD-10-CM

## 2019-04-16 DIAGNOSIS — I10 ESSENTIAL HYPERTENSION: Chronic | ICD-10-CM

## 2019-04-16 DIAGNOSIS — G40.909 NONINTRACTABLE EPILEPSY WITHOUT STATUS EPILEPTICUS, UNSPECIFIED EPILEPSY TYPE (HCC): Chronic | ICD-10-CM

## 2019-04-16 PROBLEM — L40.9 PSORIASIS: Chronic | Status: ACTIVE | Noted: 2019-04-16

## 2019-04-16 LAB
ALBUMIN SERPL BCP-MCNC: 4.1 G/DL (ref 3.5–5)
ALP SERPL-CCNC: 70 U/L (ref 46–116)
ALT SERPL W P-5'-P-CCNC: 28 U/L (ref 12–78)
ANION GAP SERPL CALCULATED.3IONS-SCNC: 5 MMOL/L (ref 4–13)
AST SERPL W P-5'-P-CCNC: 18 U/L (ref 5–45)
BASOPHILS # BLD AUTO: 0.05 THOUSANDS/ΜL (ref 0–0.1)
BASOPHILS NFR BLD AUTO: 1 % (ref 0–1)
BILIRUB SERPL-MCNC: 0.58 MG/DL (ref 0.2–1)
BUN SERPL-MCNC: 10 MG/DL (ref 5–25)
CALCIUM SERPL-MCNC: 8.6 MG/DL (ref 8.3–10.1)
CHLORIDE SERPL-SCNC: 112 MMOL/L (ref 100–108)
CHOLEST SERPL-MCNC: 96 MG/DL (ref 50–200)
CO2 SERPL-SCNC: 28 MMOL/L (ref 21–32)
CREAT SERPL-MCNC: 1.35 MG/DL (ref 0.6–1.3)
CREAT UR-MCNC: 222 MG/DL
EOSINOPHIL # BLD AUTO: 0.16 THOUSAND/ΜL (ref 0–0.61)
EOSINOPHIL NFR BLD AUTO: 2 % (ref 0–6)
ERYTHROCYTE [DISTWIDTH] IN BLOOD BY AUTOMATED COUNT: 13.6 % (ref 11.6–15.1)
EST. AVERAGE GLUCOSE BLD GHB EST-MCNC: 126 MG/DL
GFR SERPL CREATININE-BSD FRML MDRD: 75 ML/MIN/1.73SQ M
GLUCOSE P FAST SERPL-MCNC: 97 MG/DL (ref 65–99)
HBA1C MFR BLD: 6 % (ref 4.2–6.3)
HCT VFR BLD AUTO: 49.3 % (ref 36.5–49.3)
HDLC SERPL-MCNC: 30 MG/DL (ref 40–60)
HGB BLD-MCNC: 15.9 G/DL (ref 12–17)
IMM GRANULOCYTES # BLD AUTO: 0.02 THOUSAND/UL (ref 0–0.2)
IMM GRANULOCYTES NFR BLD AUTO: 0 % (ref 0–2)
LDLC SERPL CALC-MCNC: 47 MG/DL (ref 0–100)
LYMPHOCYTES # BLD AUTO: 2.29 THOUSANDS/ΜL (ref 0.6–4.47)
LYMPHOCYTES NFR BLD AUTO: 30 % (ref 14–44)
MCH RBC QN AUTO: 27.7 PG (ref 26.8–34.3)
MCHC RBC AUTO-ENTMCNC: 32.3 G/DL (ref 31.4–37.4)
MCV RBC AUTO: 86 FL (ref 82–98)
MICROALBUMIN UR-MCNC: 15.8 MG/L (ref 0–20)
MICROALBUMIN/CREAT 24H UR: 7 MG/G CREATININE (ref 0–30)
MONOCYTES # BLD AUTO: 0.44 THOUSAND/ΜL (ref 0.17–1.22)
MONOCYTES NFR BLD AUTO: 6 % (ref 4–12)
NEUTROPHILS # BLD AUTO: 4.78 THOUSANDS/ΜL (ref 1.85–7.62)
NEUTS SEG NFR BLD AUTO: 61 % (ref 43–75)
NONHDLC SERPL-MCNC: 66 MG/DL
NRBC BLD AUTO-RTO: 0 /100 WBCS
PLATELET # BLD AUTO: 129 THOUSANDS/UL (ref 149–390)
PMV BLD AUTO: 13.3 FL (ref 8.9–12.7)
POTASSIUM SERPL-SCNC: 3.8 MMOL/L (ref 3.5–5.3)
PROT SERPL-MCNC: 7.5 G/DL (ref 6.4–8.2)
RBC # BLD AUTO: 5.73 MILLION/UL (ref 3.88–5.62)
SODIUM SERPL-SCNC: 145 MMOL/L (ref 136–145)
TRIGL SERPL-MCNC: 95 MG/DL
WBC # BLD AUTO: 7.74 THOUSAND/UL (ref 4.31–10.16)

## 2019-04-16 PROCEDURE — 80053 COMPREHEN METABOLIC PANEL: CPT

## 2019-04-16 PROCEDURE — 36415 COLL VENOUS BLD VENIPUNCTURE: CPT

## 2019-04-16 PROCEDURE — 82043 UR ALBUMIN QUANTITATIVE: CPT

## 2019-04-16 PROCEDURE — 3074F SYST BP LT 130 MM HG: CPT | Performed by: INTERNAL MEDICINE

## 2019-04-16 PROCEDURE — 82570 ASSAY OF URINE CREATININE: CPT

## 2019-04-16 PROCEDURE — 3061F NEG MICROALBUMINURIA REV: CPT | Performed by: INTERNAL MEDICINE

## 2019-04-16 PROCEDURE — 85025 COMPLETE CBC W/AUTO DIFF WBC: CPT

## 2019-04-16 PROCEDURE — 80177 DRUG SCRN QUAN LEVETIRACETAM: CPT

## 2019-04-16 PROCEDURE — 80061 LIPID PANEL: CPT

## 2019-04-16 PROCEDURE — 3079F DIAST BP 80-89 MM HG: CPT | Performed by: INTERNAL MEDICINE

## 2019-04-16 PROCEDURE — 83036 HEMOGLOBIN GLYCOSYLATED A1C: CPT

## 2019-04-16 PROCEDURE — 99214 OFFICE O/P EST MOD 30 MIN: CPT | Performed by: INTERNAL MEDICINE

## 2019-04-18 ENCOUNTER — TELEPHONE (OUTPATIENT)
Dept: INTERNAL MEDICINE CLINIC | Facility: CLINIC | Age: 44
End: 2019-04-18

## 2019-04-18 LAB — LEVETIRACETAM SERPL-MCNC: 25.1 UG/ML (ref 10–40)

## 2019-04-25 ENCOUNTER — OFFICE VISIT (OUTPATIENT)
Dept: NEUROLOGY | Facility: CLINIC | Age: 44
End: 2019-04-25
Payer: COMMERCIAL

## 2019-04-25 VITALS
SYSTOLIC BLOOD PRESSURE: 110 MMHG | BODY MASS INDEX: 35.33 KG/M2 | DIASTOLIC BLOOD PRESSURE: 70 MMHG | HEIGHT: 70 IN | WEIGHT: 246.8 LBS | HEART RATE: 80 BPM

## 2019-04-25 DIAGNOSIS — G47.00 INSOMNIA, UNSPECIFIED TYPE: ICD-10-CM

## 2019-04-25 DIAGNOSIS — G40.909 NONINTRACTABLE EPILEPSY WITHOUT STATUS EPILEPTICUS, UNSPECIFIED EPILEPSY TYPE (HCC): Primary | Chronic | ICD-10-CM

## 2019-04-25 PROCEDURE — 99214 OFFICE O/P EST MOD 30 MIN: CPT | Performed by: PSYCHIATRY & NEUROLOGY

## 2019-04-25 RX ORDER — LAMOTRIGINE 25 MG/1
TABLET ORAL
Qty: 360 TABLET | Refills: 11 | Status: SHIPPED | OUTPATIENT
Start: 2019-04-25 | End: 2019-08-08

## 2019-05-06 ENCOUNTER — TELEPHONE (OUTPATIENT)
Dept: NEUROLOGY | Facility: CLINIC | Age: 44
End: 2019-05-06

## 2019-05-07 ENCOUNTER — TELEPHONE (OUTPATIENT)
Dept: NEUROLOGY | Facility: CLINIC | Age: 44
End: 2019-05-07

## 2019-05-08 ENCOUNTER — TELEPHONE (OUTPATIENT)
Dept: NEUROLOGY | Facility: CLINIC | Age: 44
End: 2019-05-08

## 2019-05-09 ENCOUNTER — TELEPHONE (OUTPATIENT)
Dept: NEUROLOGY | Facility: CLINIC | Age: 44
End: 2019-05-09

## 2019-05-20 ENCOUNTER — TELEPHONE (OUTPATIENT)
Dept: NEUROLOGY | Facility: CLINIC | Age: 44
End: 2019-05-20

## 2019-06-16 DIAGNOSIS — I10 ESSENTIAL HYPERTENSION: ICD-10-CM

## 2019-06-16 PROCEDURE — 4010F ACE/ARB THERAPY RXD/TAKEN: CPT | Performed by: INTERNAL MEDICINE

## 2019-06-16 RX ORDER — LISINOPRIL 40 MG/1
TABLET ORAL
Qty: 90 TABLET | Refills: 1 | Status: SHIPPED | OUTPATIENT
Start: 2019-06-16 | End: 2019-12-14 | Stop reason: SDUPTHER

## 2019-07-23 ENCOUNTER — TELEPHONE (OUTPATIENT)
Dept: NEUROLOGY | Facility: CLINIC | Age: 44
End: 2019-07-23

## 2019-07-23 NOTE — TELEPHONE ENCOUNTER
Please confirm that he is still taking Levetiracetam as per his last visit  We will need to reassess how he is doing and either try to increase the Levetiracetam (which may not be a great option if he is still having side effects) versus starting a different medication

## 2019-07-23 NOTE — TELEPHONE ENCOUNTER
Patient called to inform you he has stopped taking the lamotrigine as of Saturday 7/20  Patient reports medication was making him nauseated and he had developed a rash/welts to face and neck areas  Dose was 1 tablet at bedtime when he stopped it  Patient reports he is having his "small seizures"  Last seizure was this morning in which he reports missing a period of time as he was waking up between 8am and 830am      Patient has a follow up appointment with you on august 8th, in which his wife has been tracking his "events"  And she will bring all of that information along, but wanted to make you aware in the meantime that he has stopped medication  Please advise  If you have any further recommendations in the meantime

## 2019-07-26 ENCOUNTER — HOSPITAL ENCOUNTER (EMERGENCY)
Facility: HOSPITAL | Age: 44
Discharge: HOME | End: 2019-07-26
Attending: EMERGENCY MEDICINE
Payer: COMMERCIAL

## 2019-07-26 VITALS
DIASTOLIC BLOOD PRESSURE: 115 MMHG | TEMPERATURE: 98.5 F | OXYGEN SATURATION: 98 % | RESPIRATION RATE: 18 BRPM | SYSTOLIC BLOOD PRESSURE: 180 MMHG | HEART RATE: 93 BPM

## 2019-07-26 DIAGNOSIS — T50.905A DRUG REACTION: Primary | ICD-10-CM

## 2019-07-26 LAB
ALBUMIN SERPL BCP-MCNC: 4.4 G/DL (ref 3.5–5)
ALP SERPL-CCNC: 79 U/L (ref 46–116)
ALT SERPL W P-5'-P-CCNC: 25 U/L (ref 12–78)
ANION GAP SERPL CALCULATED.3IONS-SCNC: 11 MMOL/L (ref 4–13)
AST SERPL W P-5'-P-CCNC: 20 U/L (ref 5–45)
BASOPHILS # BLD AUTO: 0.05 THOUSANDS/ΜL (ref 0–0.1)
BASOPHILS NFR BLD AUTO: 0 % (ref 0–1)
BILIRUB SERPL-MCNC: 1.2 MG/DL (ref 0.2–1)
BUN SERPL-MCNC: 10 MG/DL (ref 5–25)
CALCIUM SERPL-MCNC: 9.5 MG/DL (ref 8.3–10.1)
CHLORIDE SERPL-SCNC: 106 MMOL/L (ref 100–108)
CO2 SERPL-SCNC: 27 MMOL/L (ref 21–32)
CREAT SERPL-MCNC: 1.25 MG/DL (ref 0.6–1.3)
EOSINOPHIL # BLD AUTO: 0.1 THOUSAND/ΜL (ref 0–0.61)
EOSINOPHIL NFR BLD AUTO: 1 % (ref 0–6)
ERYTHROCYTE [DISTWIDTH] IN BLOOD BY AUTOMATED COUNT: 12.7 % (ref 11.6–15.1)
GFR SERPL CREATININE-BSD FRML MDRD: 82 ML/MIN/1.73SQ M
GLUCOSE SERPL-MCNC: 91 MG/DL (ref 65–140)
HCT VFR BLD AUTO: 48.5 % (ref 36.5–49.3)
HGB BLD-MCNC: 15.8 G/DL (ref 12–17)
IMM GRANULOCYTES # BLD AUTO: 0.04 THOUSAND/UL (ref 0–0.2)
IMM GRANULOCYTES NFR BLD AUTO: 0 % (ref 0–2)
LYMPHOCYTES # BLD AUTO: 2.54 THOUSANDS/ΜL (ref 0.6–4.47)
LYMPHOCYTES NFR BLD AUTO: 23 % (ref 14–44)
MCH RBC QN AUTO: 28 PG (ref 26.8–34.3)
MCHC RBC AUTO-ENTMCNC: 32.6 G/DL (ref 31.4–37.4)
MCV RBC AUTO: 86 FL (ref 82–98)
MONOCYTES # BLD AUTO: 0.62 THOUSAND/ΜL (ref 0.17–1.22)
MONOCYTES NFR BLD AUTO: 6 % (ref 4–12)
NEUTROPHILS # BLD AUTO: 7.85 THOUSANDS/ΜL (ref 1.85–7.62)
NEUTS SEG NFR BLD AUTO: 70 % (ref 43–75)
NRBC BLD AUTO-RTO: 0 /100 WBCS
PLATELET # BLD AUTO: 129 THOUSANDS/UL (ref 149–390)
PMV BLD AUTO: 12.4 FL (ref 8.9–12.7)
POTASSIUM SERPL-SCNC: 3.6 MMOL/L (ref 3.5–5.3)
PROT SERPL-MCNC: 8.2 G/DL (ref 6.4–8.2)
RBC # BLD AUTO: 5.64 MILLION/UL (ref 3.88–5.62)
SODIUM SERPL-SCNC: 144 MMOL/L (ref 136–145)
WBC # BLD AUTO: 11.2 THOUSAND/UL (ref 4.31–10.16)

## 2019-07-26 PROCEDURE — 80177 DRUG SCRN QUAN LEVETIRACETAM: CPT | Performed by: PHYSICIAN ASSISTANT

## 2019-07-26 PROCEDURE — 80053 COMPREHEN METABOLIC PANEL: CPT | Performed by: PHYSICIAN ASSISTANT

## 2019-07-26 PROCEDURE — 36415 COLL VENOUS BLD VENIPUNCTURE: CPT | Performed by: PHYSICIAN ASSISTANT

## 2019-07-26 PROCEDURE — 99283 EMERGENCY DEPT VISIT LOW MDM: CPT

## 2019-07-26 PROCEDURE — 85025 COMPLETE CBC W/AUTO DIFF WBC: CPT | Performed by: PHYSICIAN ASSISTANT

## 2019-07-26 PROCEDURE — 99283 EMERGENCY DEPT VISIT LOW MDM: CPT | Performed by: EMERGENCY MEDICINE

## 2019-07-26 RX ORDER — PREDNISONE 10 MG/1
TABLET ORAL
Qty: 38 TABLET | Refills: 0 | Status: SHIPPED | OUTPATIENT
Start: 2019-07-26 | End: 2019-09-05 | Stop reason: ALTCHOICE

## 2019-07-27 NOTE — ED PROVIDER NOTES
History  Chief Complaint   Patient presents with    Medication Problem     Pt reports he started taking Lamictal for seizures last week  Pt reports since he started with a "Rash, feels like my face is on fire, welts all over my body, and itchiness " Pt reports taking last dose last Saturday, last seizure was a week ago  Pt reports he tried calling his nuerologist to make him aware but never receieved a call back "     Dina Fernandez is a 41 y o  male w PMH DM, HTN, seizures, TIA, mitral valve mass who presents for evaluation of rash  Patient reports he has a history of seizures for which she is taking Keppra for many years  He reports his seizures are well controlled on this but he does have some side effects of decreased sleep and some occasional irritability for which his neurologist wanted to switch him to lamotrigine  His neurologist started increasing the dose of his Lamictal which he thinks he started on July 10th  He reports initially on July 10 he had some nausea and later developed a rash on his face  He describes this has multiple lesions across his face that were slightly erythematous, described as well  He reports they were not Hg but somewhat painful and uncomfortable  He noticed his skin started to peel  He then stopped his medication about a week ago  He has still been taking his regular Keppra dose and has had no change in his usual seizures  He still has slight discomfort in his face described as burning but it is much improved  No relief with benadryl for his rash  No sensation of throat closing  Prior to Admission Medications   Prescriptions Last Dose Informant Patient Reported? Taking?    Lancets (FREESTYLE) lancets  Self No No   Sig: Use as instructed   Patient taking differently: 1 each by Other route as needed Twice a week   NIFEdipine ER (ADALAT CC) 60 MG 24 hr tablet  Self Yes No   Sig: Take 60 mg by mouth daily   amLODIPine (NORVASC) 5 mg tablet  Self Yes No   Sig: Take 5 mg by mouth daily   betamethasone dipropionate (DIPROSONE) 0 05 % ointment  Self Yes No   Sig: APPLY TO AREAS OF PSORIASIS ON BODY(AVIOD FACE)ONCE DAILY 3 TO 4 TIMES A WEEK AS NEEDED FOR FLARE UP   calcipotriene (DOVONEX) 0 005 % cream  Self Yes No   Sig: APPLY TO PSORIASIS ON DAYS THAT YOU ARE NOT USING STEROID CREAM PRN   fluocinonide (LIDEX) 0 05 % external solution  Self Yes No   Sig: APPLY TO SCALP 3-4 TIMES PER WEEK AS NEEDED FOR FLARE UP (AVOID FACE AND SKIN FOLDS)   lamoTRIgine (LaMICtal) 25 mg tablet   No No   Sig: Increase as instructed to goal dose of 150 mg twice a day   levETIRAcetam (KEPPRA) 500 mg tablet  Self No No   Sig: Take 500 mg twice a day for 1 week, then take 1000 mg twice a day     lisinopril (ZESTRIL) 40 mg tablet   No No   Sig: TAKE 1 TABLET BY MOUTH EVERY DAY   sitaGLIPtin (JANUVIA) 100 mg tablet   No No   Sig: Take 1 tablet (100 mg total) by mouth daily   triamcinolone (KENALOG) 0 1 % cream  Self Yes No   Sig: APPLY TO AREAS OF PSORIASIS ON FACE AND SKIN FOLDS ONCE DAILY 3-4 DAYS A WEEK AS NEEDED FOR FLARES      Facility-Administered Medications: None       Past Medical History:   Diagnosis Date    [facility] mellitus ([facility] [address] )     Hypertension     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, but path consistent with thrombotic fibrotic mass, marantic endocarditis    Seizure (HCC)     possible, unclear history    TIA (transient ischemic attack)     Possible       Past Surgical History:   Procedure Laterality Date    APPENDECTOMY      CARDIAC SURGERY      ROTATOR CUFF REPAIR         Family History   Problem Relation Age of Onset    Heart attack Mother     Hypertension Mother    Kendy Silence Eczema Mother     Uterine cancer Maternal Grandmother     Hypertension Maternal Grandmother     Diabetes Maternal Grandmother     Diabetes type II Paternal Grandmother     Hypertension Paternal Grandmother     Cancer Paternal Grandfather     Breast cancer Maternal Aunt     Diabetes Maternal Aunt     Eczema Sister     Hypertension Sister     Eczema Brother     Eczema Son     Eczema Daughter     Eczema Sister     Seizures Neg Hx      I have reviewed and agree with the history as documented  Social History     Tobacco Use    Smoking status: Former Smoker     Packs/day: 1 00     Years: 17 00     Pack years: 17 00     Types: Cigarettes     Start date: 7/31/1996     Last attempt to quit: 1/1/2016     Years since quitting: 3 5    Smokeless tobacco: Never Used   Substance Use Topics    Alcohol use: Not Currently     Frequency: Patient refused     Drinks per session: Patient refused     Binge frequency: Never     Comment: rarely    Drug use: No     Comment: occasionally        Review of Systems   Constitutional: Negative for activity change, chills, diaphoresis, fatigue and fever  HENT: Negative for congestion and rhinorrhea  Eyes: Negative for pain  Respiratory: Negative for cough, chest tightness, shortness of breath and wheezing  Cardiovascular: Negative for chest pain and palpitations  Gastrointestinal: Negative for abdominal distention, constipation, diarrhea, nausea and vomiting  Genitourinary: Negative for difficulty urinating and dysuria  Musculoskeletal: Negative for arthralgias and myalgias  Skin: Positive for rash  Neurological: Negative for dizziness, weakness, light-headedness and headaches  Psychiatric/Behavioral: The patient is not nervous/anxious  Physical Exam  Physical Exam   Constitutional: He is oriented to person, place, and time  He appears well-developed and well-nourished  No distress  HENT:   Head: Normocephalic and atraumatic  Eyes: Pupils are equal, round, and reactive to light  Neck: Normal range of motion  Neck supple  No tracheal deviation present  Cardiovascular: Normal rate, regular rhythm, normal heart sounds and intact distal pulses  Exam reveals no gallop and no friction rub  No murmur heard    Pulmonary/Chest: Effort normal and breath sounds normal  No respiratory distress  He has no wheezes  He has no rales  Abdominal: Soft  Bowel sounds are normal  He exhibits no distension and no mass  There is no tenderness  There is no guarding  Musculoskeletal: He exhibits no edema or deformity  Neurological: He is alert and oriented to person, place, and time  GCS 15, nonfocal exam, normal motor and sensory function   Skin: Skin is warm and dry  He is not diaphoretic  Several scattered papules across the face, very small, nontender to palpation, no welts or hives  No excoriations or sloughing of skin  He showed me a picture from when the rash was worse, does not look consistent with urticaria in the picture but it does look like his eyelids were slightly edematous at that time  Psychiatric: He has a normal mood and affect  His behavior is normal    Nursing note and vitals reviewed        Vital Signs  ED Triage Vitals [07/26/19 1934]   Temperature Pulse Respirations Blood Pressure SpO2   98 5 °F (36 9 °C) 93 18 (!) 180/115 98 %      Temp Source Heart Rate Source Patient Position - Orthostatic VS BP Location FiO2 (%)   Oral Monitor Sitting Left arm --      Pain Score       7           Vitals:    07/26/19 1934   BP: (!) 180/115   Pulse: 93   Patient Position - Orthostatic VS: Sitting         Visual Acuity      ED Medications  Medications - No data to display    Diagnostic Studies  Results Reviewed     Procedure Component Value Units Date/Time    Comprehensive metabolic panel [[id number]]  (Abnormal) Collected:  07/26/19 2104    Lab Status:  Final result Specimen:  Blood from Arm, Right Updated:  07/26/19 2134     Sodium 144 mmol/L      Potassium 3 6 mmol/L      Chloride 106 mmol/L      CO2 27 mmol/L      ANION GAP 11 mmol/L      BUN 10 mg/dL      Creatinine 1 25 mg/dL      Glucose 91 mg/dL      Calcium 9 5 mg/dL      AST 20 U/L      ALT 25 U/L      Alkaline Phosphatase 79 U/L      Total Protein 8 2 g/dL      Albumin 4 4 g/dL      Total Bilirubin 1 20 mg/dL      eGFR 82 ml/min/1 73sq m     Narrative:       [place] guidelines for Chronic Kidney Disease (CKD):     Stage 1 with normal or high GFR (GFR > 90 mL/min/1 73 square meters)    Stage 2 Mild CKD (GFR = 60-89 mL/min/1 73 square meters)    Stage 3A Moderate CKD (GFR = 45-59 mL/min/1 73 square meters)    Stage 3B Moderate CKD (GFR = 30-44 mL/min/1 73 square meters)    Stage 4 Severe CKD (GFR = 15-29 mL/min/1 73 square meters)    Stage 5 End Stage CKD (GFR <15 mL/min/1 73 square meters)  Note: GFR calculation is accurate only with a steady state creatinine    CBC and differential [[id number]]  (Abnormal) Collected:  07/26/19 2104    Lab Status:  Final result Specimen:  Blood from Arm, Right Updated:  07/26/19 2116     WBC 11 20 Thousand/uL      RBC 5 64 Million/uL      Hemoglobin 15 8 g/dL      Hematocrit 48 5 %      MCV 86 fL      MCH 28 0 pg      MCHC 32 6 g/dL      RDW 12 7 %      MPV 12 4 fL      Platelets 834 Thousands/uL      nRBC 0 /100 WBCs      Neutrophils Relative 70 %      Immat GRANS % 0 %      Lymphocytes Relative 23 %      Monocytes Relative 6 %      Eosinophils Relative 1 %      Basophils Relative 0 %      Neutrophils Absolute 7 85 Thousands/µL      Immature Grans Absolute 0 04 Thousand/uL      Lymphocytes Absolute 2 54 Thousands/µL      Monocytes Absolute 0 62 Thousand/µL      Eosinophils Absolute 0 10 Thousand/µL      Basophils Absolute 0 05 Thousands/µL     Levetiracetam level [[id number]] Collected:  07/26/19 2104    Lab Status: In process Specimen:  Blood from Arm, Right Updated:  07/26/19 2114                 No orders to display              Procedures  Procedures       ED Course                               MDM  Number of Diagnoses or Management Options  Drug reaction:   Diagnosis management comments: DDX includes but not ltd to:   Concern for reaction to the lamotrigine    Consider that he was starting to develop early signs of a possible toxic drug rash given he describes some skin peeling and welts on his face  However difficult to assess just on the picture he showed me  At this time he has been off the medication for several days, his rash looks stable, at this time not concerning for toxic drug rash or Major Royal syndrome  He should continue to hold this medication until he follows up with Dr Alla Simental  She continued to take his Keppra  Basic labs today were normal   Did obtain Keppra level for Neurology as he has a recent visit in about a week  He can take prednisone for the rash and discomfort  Return parameters discussed  Pt requires f/u as an outpt  Pt expresses understanding w above treatment plan  All questions answered prior to d/c  Disposition  Final diagnoses:   Drug reaction     Time reflects when diagnosis was documented in both MDM as applicable and the Disposition within this note     Time User Action Codes Description Comment    7/26/2019  9:54 PM Teagan Brown Dr Drug reaction       ED Disposition     ED Disposition Condition Date/Time Comment    Discharge Stable Fri Jul 26, 2019  8:55 PM Megan Humphrey discharge to home/self care  Follow-up Information     Follow up With Specialties Details Why [address], DO Internal Medicine  As needed [address]      Zakiya Barreto MD Neurology Call in 1 day  [address] [place] [address] [address] [place] [address]  [phone]            Discharge Medication List as of 7/26/2019 10:19 PM      START taking these medications    Details   predniSONE 10 mg tablet 6 tabs daily for 4 days then, 4 tabs daily for 2 days then, 2 tabs daily for 2 days then, 1 tab daily 2 days  , Print         CONTINUE these medications which have NOT CHANGED    Details   amLODIPine (NORVASC) 5 mg tablet Take 5 mg by mouth daily, Starting u 8/2/2018, Historical Med      betamethasone dipropionate (DIPROSONE) 0 05 % ointment APPLY TO AREAS OF PSORIASIS ON BODY(AVIOD FACE)ONCE DAILY 3 TO 4 TIMES A WEEK AS NEEDED FOR FLARE UP, Historical Med      calcipotriene (DOVONEX) 0 005 % cream APPLY TO PSORIASIS ON DAYS THAT YOU ARE NOT USING STEROID CREAM PRN, Historical Med      fluocinonide (LIDEX) 0 05 % external solution APPLY TO SCALP 3-4 TIMES PER WEEK AS NEEDED FOR FLARE UP (AVOID FACE AND SKIN FOLDS), Historical Med      lamoTRIgine (LaMICtal) 25 mg tablet Increase as instructed to goal dose of 150 mg twice a day, Normal      Lancets (FREESTYLE) lancets Use as instructed, Print      levETIRAcetam (KEPPRA) 500 mg tablet Take 500 mg twice a day for 1 week, then take 1000 mg twice a day , Normal      lisinopril (ZESTRIL) 40 mg tablet TAKE 1 TABLET BY MOUTH EVERY DAY, Normal      NIFEdipine ER (ADALAT CC) 60 MG 24 hr tablet Take 60 mg by mouth daily, Starting Wed 7/18/2018, Historical Med      sitaGLIPtin (JANUVIA) 100 mg tablet Take 1 tablet (100 mg total) by mouth daily, Starting Tue 4/16/2019, Normal      triamcinolone (KENALOG) 0 1 % cream APPLY TO AREAS OF PSORIASIS ON FACE AND SKIN FOLDS ONCE DAILY 3-4 DAYS A WEEK AS NEEDED FOR FLARES, Historical Med           No discharge procedures on file      ED Provider  Electronically Signed by           Leila Delgadillo PA-C  07/27/19 6825

## 2019-07-29 LAB — LEVETIRACETAM SERPL-MCNC: 5.4 UG/ML (ref 10–40)

## 2019-08-01 ENCOUNTER — TELEPHONE (OUTPATIENT)
Dept: INTERNAL MEDICINE CLINIC | Facility: CLINIC | Age: 44
End: 2019-08-01

## 2019-08-01 DIAGNOSIS — G40.909 SEIZURE DISORDER (HCC): Primary | ICD-10-CM

## 2019-08-01 NOTE — TELEPHONE ENCOUNTER
[facility] WANT DR TO PUT IN Epic A  TO DR SÁNCHEZ FOR NEUROLOGY    THE ONE IN THERE IS GOING TO  IN [facility]

## 2019-08-05 NOTE — PROGRESS NOTES
Patient ID: Ramona Meléndez is a 41 y o  male with ***, who is ***returning to Neurology office for ***follow up of his seizure***  Assessment/Plan:    No problem-specific Assessment & Plan notes found for this encounter  He will return to the office in about *** months  Subjective:    HPI  Current seizure medications:  1  ***  Other medications as per [facility]  I last saw him in the office on ***  At that time, he was ***    Since his last visit, ***    Prior Seizure Medications: ***    His history was also obtained from his ***, who {was/were} present at today's visit  *** I personally reviewed his ***MRI from ***, as detailed below  *** I reviewed ***, as documented in Epic/[facility], and summarized above  {Common ambulatory SmartLinks:02182}     Objective: There were no vitals taken for this visit      Physical Exam    Neurological Exam      ROS:    Review of Systems    I personally reviewed the ROS that was entered by the medical assistant

## 2019-08-08 ENCOUNTER — OFFICE VISIT (OUTPATIENT)
Dept: NEUROLOGY | Facility: CLINIC | Age: 44
End: 2019-08-08
Payer: COMMERCIAL

## 2019-08-08 ENCOUNTER — TELEPHONE (OUTPATIENT)
Dept: NEUROLOGY | Facility: CLINIC | Age: 44
End: 2019-08-08

## 2019-08-08 VITALS
WEIGHT: 242 LBS | SYSTOLIC BLOOD PRESSURE: 146 MMHG | HEIGHT: 71 IN | BODY MASS INDEX: 33.88 KG/M2 | HEART RATE: 88 BPM | DIASTOLIC BLOOD PRESSURE: 86 MMHG

## 2019-08-08 DIAGNOSIS — G40.909 NONINTRACTABLE EPILEPSY WITHOUT STATUS EPILEPTICUS, UNSPECIFIED EPILEPSY TYPE (HCC): ICD-10-CM

## 2019-08-08 DIAGNOSIS — G40.909 SEIZURE DISORDER (HCC): ICD-10-CM

## 2019-08-08 PROCEDURE — 99214 OFFICE O/P EST MOD 30 MIN: CPT | Performed by: PSYCHIATRY & NEUROLOGY

## 2019-08-08 RX ORDER — LEVETIRACETAM 500 MG/1
TABLET ORAL
Qty: 180 TABLET | Refills: 11 | Status: SHIPPED | OUTPATIENT
Start: 2019-08-08 | End: 2020-08-13 | Stop reason: SDUPTHER

## 2019-08-08 RX ORDER — HYDROCHLOROTHIAZIDE 25 MG/1
1 TABLET ORAL DAILY
COMMUNITY
Start: 2018-03-16 | End: 2020-04-02

## 2019-08-08 NOTE — PATIENT INSTRUCTIONS
-- Please increase your Levetiracetam to 1500 mg twice a day  -- if you continue to have seizures, please give our office a call, and can discuss getting you arranged to be admitted to the Epilepsy Monitoring Unit to capture and better characterize our events

## 2019-08-08 NOTE — PROGRESS NOTES
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, who is returning to Neurology office for follow up of his seizures  Assessment/Plan:    Epilepsy ([facility] [address] )  The description of his prior events, which were described as episodes of loss of consciousness with whole body shaking which would result injuries such as torn rotator cuff and episodes of staring and unresponsiveness with oral mouth movements, would be very concerning for epileptic seizures  That being said, his most recent episodes where he was shaking all over his body with retained consciousness would be very atypical for seizures  This was triggered by stress, as below  Overall, it is unclear if he has epileptic seizures, nonepileptic psychogenic spells, or combination of the 2  He previously had responded quite well to levetiracetam, and is no longer reporting the irritability that was a problem in the past   His wife also denies that the irritability is a problem today  We discussed the possibility of continuing to make medication adjustments, verses arranging for admission to the epilepsy monitoring unit to capture and characterize his events  After discussing options, he preferred to make medication adjustments for now, and consider admission to the epilepsy monitoring unit if this is not effective  --I will have him increase his levetiracetam to be 1500 mg twice a day  I discussed the risks rationale this increase, including potential side effects  If he would have more irritability, he will give our office a call  --if he continues to have episodes despite this increase, he will give our office a call, and we will likely arrange for him to be admitted to the epilepsy monitoring unit to better capture and characterize his episodes    While there, I would anticipate that he would likely be weaned off of his medications to determine if he has epilepsy  I spent a total of 25 min with the patient with greater than 50% of that time spent counseling and coordinating his care, specifically discussing his diagnosis, medications, possible EMU admission, and plan, as detailed above       He will return to the office in about 3 months  Subjective:    HPI    Current seizure medications:  1  Levetiracetam 1000 mg twice a day  Other medications as per Epic  I last saw him in the office on 4/25/2019  At that time, he was reporting that he had not had any additional seizures since being restarted on Levetiracetam, but was having significant difficulty with increased irritability and insomnia  To try to help with his symptoms and continue to control his events concerning for seizures, he was started on a plan to initiate lamotrigine and gradually wean off of levetiracetam     Since his last visit, he did start taking lamotrigine, but started developed a rash on his face  He describes this as red blotches on his face that was burning  Because of this, he stopped taking the lamotrigine and did get seen at the emergency department, where he was given a course of steroids  Overall, his rash has now resolved  Although he does have psoriasis, this is very different from his prior psoriasis rashes  He has continued to take levetiracetam unchanged since this time  He previously reported on the phone that he was taking only 500 mg twice a day of levetiracetam, but confirms that he actually has been taking 1000 mg twice a day  Since his last visit, he reports that he has had at least to [address] 1 of them occurred while he was in the garage and he simply lost memory for about 1 hour, without any knowledge of what occurred during that time  He had another episode similar where he lost memory for prolonged period of time, but this was witnessed by his wife    His wife noticed that he was standing, was shaking all over, had a blank stare, but was then able to walk to the bedroom and lie down  While lying down, he was shaking all over, described as low-amplitude trembling of his whole body with head nodding up and down repeatedly  He was still responsive during this time, being able to talk to his wife  This shaking last for approximately 8 minutes  They feel this is different from his previous episodes where he would fully lose consciousness when he would shake  This episode occurred shortly after receiving a letter in the mail notifying him that their house was in [facility]  In addition, he has had multiple smaller episodes where he would awaken and feel confused and not know where he is  This occurred daily from the end of June until mid July, but then gradually slowed down and stopped  He is now following with a psychiatrist, having started to see them a few months ago  He previously had reported some difficulty with irritability because of levetiracetam, but both he and his wife deny that this is problem any longer  It was a problem a little bit when it was first started, this has gradually improved  Prior Medications: Phenytoin (quickly changed to Levetiracetam), Lamotrigine (rash)    His history was also obtained from his wife, who was present at today's visit  The following portions of the patient's history were reviewed and updated as appropriate: allergies, current medications and problem list          Objective:    Blood pressure 146/86, pulse 88, height 5' 10 5" (1 791 m), weight 110 kg (242 lb)  Physical Exam    Neurological Exam      ROS:    Review of Systems   Constitutional: Positive for fatigue  Negative for appetite change and fever  HENT: Negative  Negative for hearing loss, tinnitus, trouble swallowing and voice change  Eyes: Positive for visual disturbance (focusing)  Negative for photophobia and pain     Respiratory: Positive for shortness of breath and wheezing  Cardiovascular: Positive for palpitations  Gastrointestinal: Positive for nausea  Negative for vomiting  Endocrine: Negative  Negative for cold intolerance and heat intolerance  Genitourinary: Negative  Negative for dysuria, frequency and urgency  Musculoskeletal: Positive for back pain and neck pain  Negative for myalgias  Skin: Negative  Negative for rash  Neurological: Positive for seizures (8/6/2019), facial asymmetry (right facial numbness), speech difficulty, light-headedness and numbness (bl hands)  Negative for dizziness, tremors, syncope, weakness and headaches  Hematological: Negative  Does not bruise/bleed easily  Psychiatric/Behavioral: Negative  Negative for confusion, hallucinations and sleep disturbance  All other systems reviewed and are negative

## 2019-08-08 NOTE — ASSESSMENT & PLAN NOTE
The description of his prior events, which were described as episodes of loss of consciousness with whole body shaking which would result injuries such as torn rotator cuff and episodes of staring and unresponsiveness with oral mouth movements, would be very concerning for epileptic seizures  That being said, his most recent episodes where he was shaking all over his body with retained consciousness would be very atypical for seizures  This was triggered by stress, as below  Overall, it is unclear if he has epileptic seizures, nonepileptic psychogenic spells, or combination of the 2  He previously had responded quite well to levetiracetam, and is no longer reporting the irritability that was a problem in the past   His wife also denies that the irritability is a problem today  We discussed the possibility of continuing to make medication adjustments, verses arranging for admission to the epilepsy monitoring unit to capture and characterize his events  After discussing options, he preferred to make medication adjustments for now, and consider admission to the epilepsy monitoring unit if this is not effective  --I will have him increase his levetiracetam to be 1500 mg twice a day  I discussed the risks rationale this increase, including potential side effects  If he would have more irritability, he will give our office a call  --if he continues to have episodes despite this increase, he will give our office a call, and we will likely arrange for him to be admitted to the epilepsy monitoring unit to better capture and characterize his episodes  While there, I would anticipate that he would likely be weaned off of his medications to determine if he has epilepsy

## 2019-08-08 NOTE — TELEPHONE ENCOUNTER
Called patient letting him know paper work is ready for pickup  Patient will pass by on Monday to pick the paper work

## 2019-08-08 NOTE — TELEPHONE ENCOUNTER
Received FMLA- In the [employer] [place], [place] form to be filled out by Dr Meng Song  Collected  form fee of $15 00 by credit card  Forms are scanned in chart and routed to the  Nurses in [facility] to be filled out  Please review cover page for specific directions regarding the fact that the original forms needs to be filled out and return  NOT A COPY  The original forms have being sent interoffice mail  Forms are scanned in Registration under Documents and other   FMLA    Thank you

## 2019-08-12 ENCOUNTER — TELEPHONE (OUTPATIENT)
Dept: NEUROLOGY | Facility: CLINIC | Age: 44
End: 2019-08-12

## 2019-08-12 NOTE — TELEPHONE ENCOUNTER
Patient stopped by the office requesting copies of Office notes from Dr Kala Peña  Patient sign a Release of Records  Copies given to patient

## 2019-09-05 ENCOUNTER — OFFICE VISIT (OUTPATIENT)
Dept: NEUROLOGY | Facility: CLINIC | Age: 44
End: 2019-09-05
Payer: COMMERCIAL

## 2019-09-05 VITALS
HEART RATE: 84 BPM | DIASTOLIC BLOOD PRESSURE: 114 MMHG | WEIGHT: 238 LBS | HEIGHT: 71 IN | SYSTOLIC BLOOD PRESSURE: 160 MMHG | BODY MASS INDEX: 33.32 KG/M2

## 2019-09-05 DIAGNOSIS — Z86.73 HISTORY OF CVA (CEREBROVASCULAR ACCIDENT): Chronic | ICD-10-CM

## 2019-09-05 DIAGNOSIS — G40.909 NONINTRACTABLE EPILEPSY WITHOUT STATUS EPILEPTICUS, UNSPECIFIED EPILEPSY TYPE (HCC): Primary | Chronic | ICD-10-CM

## 2019-09-05 DIAGNOSIS — R42 DIZZINESS AND GIDDINESS: ICD-10-CM

## 2019-09-05 PROCEDURE — 99215 OFFICE O/P EST HI 40 MIN: CPT | Performed by: PSYCHIATRY & NEUROLOGY

## 2019-09-05 NOTE — PROGRESS NOTES
Chantal Lazo is a 45 y o  male  Chief Complaint   Patient presents with    Seizures       Assessment:  1  Nonintractable epilepsy without status epilepticus, unspecified epilepsy type ([address] )    2  History of CVA (cerebrovascular accident)    3  Dizziness and giddiness          Discussion:  Patient's recent ER visit and epilepsy notes were reviewed, patient has probably combination of complex partial seizures and non epilepsy psychogenic spells secondary to stress, he is currently doing well on Keppra 1500 mg b i d  And is being followed by Harriet Love, he has a prior history of stroke in 2016 in the left hemisphere and since then he has been on a full-dose aspirin, given that patient is having dizziness which I think most likely secondary to his blood pressure but given his prior history of stroke and risk factors we would check an MRI scan of the brain and carotid ultrasound, he will call me after the test to discuss the results, I have advised him to discuss with his cardiologist and family physician ASAP regarding his elevated blood pressure, he was also advised to continue with seizure precautions, avoid seizure triggers, stroke education given to the patient, to keep blood pressure cholesterol and sugar under control, to go to the hospital if has any worsening symptoms and call me otherwise to see me back in 3 months and follow up with his other physicians      Subjective:    HPI   Patient is here in follow-up for his history of seizure and history of TIA/CVA in 2016, he has history of seizure since 2004 and is currently in follow-up with our seizure specialist and was being weaned off the [address] and was started on Lamictal many developed a rash and hence the Lamictal was discontinued and is currently on Keppra 1500 mg twice a day and is doing well, he has a history of CVA with the right-sided numbness, and according to the patient he has had a negative JULIA had an MRI scan and he is to be on a baby aspirin which was increased to regular aspirin, he has been doing well except that he has been having some dizziness on and off and he had an episode of blurring of the vision for a few seconds to minute recently, he also had an episode of shaking all over his body with retained consciousness, he usually takes his blood pressure at home and says that it has been normal, today his blood pressure is elevated in the office, he does have some on and off headaches but he is not having any worst headache of his life, no motor or sensory symptoms in upper or lower extremities, no other complaints      Vitals:    09/05/19 1446 09/05/19 1449   BP: (!) 164/120 (!) 160/114   BP Location: Right arm Left arm   Patient Position: Sitting Sitting   Cuff Size: Large Large   Pulse: 84 84   Weight: 108 kg (238 lb)    Height: 5' 10 5" (1 791 m)        Current Medications    Current Outpatient Medications:     amLODIPine (NORVASC) 5 mg tablet, Take 5 mg by mouth daily, Disp: , Rfl: 5    betamethasone dipropionate (DIPROSONE) 0 05 % ointment, APPLY TO AREAS OF PSORIASIS ON BODY(AVIOD FACE)ONCE DAILY 3 TO 4 TIMES A WEEK AS NEEDED FOR FLARE UP, Disp: , Rfl: 2    calcipotriene (DOVONEX) 0 005 % cream, APPLY TO PSORIASIS ON DAYS THAT YOU ARE NOT USING STEROID CREAM PRN, Disp: , Rfl: 3    fluocinonide (LIDEX) 0 05 % external solution, APPLY TO SCALP 3-4 TIMES PER WEEK AS NEEDED FOR FLARE UP (AVOID FACE AND SKIN FOLDS), Disp: , Rfl:     glucose blood test strip, daily, Disp: , Rfl:     hydrochlorothiazide (HYDRODIURIL) 25 mg tablet, Take 1 tablet by mouth daily, Disp: , Rfl:     Lancets (FREESTYLE) lancets, Use as instructed (Patient taking differently: 1 each by Other route daily Twice a week), Disp: 100 each, Rfl: 0    levETIRAcetam (KEPPRA) 500 mg tablet, Take 1500 mg (3 tabs) twice a day , Disp: 180 tablet, Rfl: 11    lisinopril (ZESTRIL) 40 mg tablet, TAKE 1 TABLET BY MOUTH EVERY DAY (Patient taking differently: Take 40 mg by mouth daily ), Disp: 90 tablet, Rfl: 1    NIFEdipine ER (ADALAT CC) 60 MG 24 hr tablet, Take 60 mg by mouth daily, Disp: , Rfl: 3    sitaGLIPtin (JANUVIA) 100 mg tablet, Take 1 tablet (100 mg total) by mouth daily, Disp: 90 tablet, Rfl: 1    triamcinolone (KENALOG) 0 1 % cream, APPLY TO AREAS OF PSORIASIS ON FACE AND SKIN FOLDS ONCE DAILY 3-4 DAYS A WEEK AS NEEDED FOR FLARES, Disp: , Rfl: 3      Allergies  Cat hair extract; Metformin; Lamotrigine; and Penicillins    Past Medical History  Past Medical History:   Diagnosis Date    Diabetes mellitus ([facility] )     Hypertension     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, but path consistent with thrombotic fibrotic mass, marantic endocarditis    Seizure ([facility] )     possible, unclear history    TIA (transient ischemic attack)     Possible         Past Surgical History:  Past Surgical History:   Procedure Laterality Date    APPENDECTOMY      CARDIAC SURGERY      ROTATOR CUFF REPAIR           Family History:  Family History   Problem Relation Age of Onset    Heart attack Mother     Hypertension Mother    [facility] Eczema Mother     Uterine cancer Maternal Grandmother     Hypertension Maternal Grandmother     Diabetes Maternal Grandmother     Diabetes type II Paternal Grandmother     Hypertension Paternal Grandmother     Cancer Paternal Grandfather     Breast cancer Maternal Aunt     Diabetes Maternal Aunt     Eczema Sister     Hypertension Sister     Eczema Brother     Eczema Son     Eczema Daughter     Eczema Sister     Seizures Neg Hx        Social History:   reports that he quit smoking about 3 years ago  His smoking use included cigarettes  He started smoking about 23 years ago  He has a 17 00 pack-year smoking history  He has never used smokeless tobacco  He reports that he drank alcohol  He reports that he does not use drugs      I have reviewed the past medical history, surgical history, social and family history, current medications, allergies vitals, review of systems, and updated this information as appropriate today  Objective:    Physical Exam    Neurological Exam    GENERAL:  Cooperative in no acute distress  Well-developed and well-nourished    HEAD and NECK   Head is atraumatic normocephalic with no lesions or masses  Neck is supple with full range of motion    CARDIOVASCULAR  Carotid Arteries-no carotid bruits  NEUROLOGIC:  Mental Status-the patient is awake alert and oriented without aphasia or apraxia  Cranial Nerves: Visual fields are full to confrontation  Extraocular movements are full without nystagmus  Pupils are 2-1/2 mm and reactive  Face is symmetrical to light touch  Movements of facial expression move symmetrically  Hearing is normal to finger rub bilaterally  Soft palate lifts symmetrically  Shoulder shrug is symmetrical  Tongue is midline without atrophy  Motor: No drift is noted on arm extension  Strength is full in the upper and lower extremities with normal bulk and tone  Sensory: Intact to temperature and vibratory sensation in the upper and lower extremities bilaterally  Cortical function is intact  Coordination: Finger to nose testing is performed accurately  Romberg is negative  Gait reveals a normal base with symmetrical arm swing  Tandem walk is normal   Reflexes:   1+ and symmetrical              ROS:  Review of Systems   Constitutional: Negative for appetite change, fatigue and fever  HENT: Negative  Negative for hearing loss, tinnitus, trouble swallowing and voice change  Eyes: Positive for visual disturbance  Negative for photophobia and pain  Respiratory: Positive for apnea  Negative for shortness of breath  Loud snoring   Cardiovascular: Negative  Negative for palpitations  Gastrointestinal: Negative  Negative for constipation, diarrhea, nausea and vomiting  Endocrine: Negative  Negative for cold intolerance and heat intolerance  Genitourinary: Negative  Negative for dysuria, frequency and urgency  Musculoskeletal: Positive for neck pain (right sided)  Negative for myalgias  Skin: Negative  Negative for rash  Neurological: Positive for dizziness, weakness (Left leg), numbness (hands) and headaches  Negative for tremors, seizures, syncope, facial asymmetry, speech difficulty and light-headedness  Staring Spells   Hematological: Negative  Does not bruise/bleed easily  Psychiatric/Behavioral: Positive for confusion, decreased concentration, dysphoric mood and sleep disturbance (Trouble falling and staying asleep)  Negative for hallucinations

## 2019-11-15 NOTE — TELEPHONE ENCOUNTER
Received FMLA- In the [employer] [place], [place] form to be filled out by Dr Richar Palacio  Collected form fee of $15 00 by credit card  Forms are scanned in chart and routed to the Nurses in [place] to be filled out  Please review cover page for specific directions regarding the fact that the original forms needs to be filled out and return  NOT A COPY  The original forms have being sent interoffice mail  Also a copy of the old forms will be sent interoffice to copy from it      Forms are scanned in Registration under Documents and other FMLA    Thank you

## 2019-11-21 NOTE — TELEPHONE ENCOUNTER
Forms scanned under media, but clinical team did not yet receive the originals in [facility]  36.8

## 2019-11-22 ENCOUNTER — TELEPHONE (OUTPATIENT)
Dept: NEUROLOGY | Facility: CLINIC | Age: 44
End: 2019-11-22

## 2019-11-22 NOTE — TELEPHONE ENCOUNTER
Patient came to the office with new copy of form  Form completed and brought to Dr Lauro Dumont in the hospital, brought back, scanned into the chart by [facility] and then original given to the patient  Nothing additional is needed at this time

## 2019-11-22 NOTE — TELEPHONE ENCOUNTER
Pt called and states that he dropped of a form last Friday 11/15/19 at [facility] office for Dr Maciej Lombardi to fill out  Pt states that he paid $15 for this form  Requesting this form be filled out asap, due date is 11/26/19  Pt is requesting a call back once completed  He needs to submit the original form  He would like to pick it up [facility] office)  Pt states that he needs to submit this on 11/26/19 or he will go to [facility]  Advised pt that we have not receive the original form yet  [facility] office, spoke sreekanth/ Salima Samuel who confirmed that form was sent interoffice mail on 11/15/19  Spoke w/ Juanito Suggs [facility] MR)-no form received at this time  Spoke w/ Jessica Lloyd, he will find out the # to call so I can check if interoffice mail is scheduled to be delivered today  Pt states that if we do not find the original, he needs to know by Monday so he can go and get another form but he will need this form be filled out that day (Monday 11/25/19)  Seng Scott is on the phone w/ aidan Samuel after she document her phone call, to send this encounter to Dr Maciej Lombardi to make him aware                   [phone] ok to leave detailed message

## 2019-11-22 NOTE — TELEPHONE ENCOUNTER
Called mailroom  Unable to determine the status of where the interoffice mail could be  Advised to call the other offices to find out if they have it  Called Domestic relations to advise them that we have missplaced the form and are unable to fill out the original  We will not  Be able to meet the deadline that the patient has been given  Advised by Domestic relations that we can provide a letter regarding what happened to them and patient can have an extension    Patient called and advised of situation  Wife placed on call along with patient  Patient given option that he can  form today and bring to office by 12pm and we will fill it out and have it signed  If not signed today, he will not be able to  form until next Friday  Patient's wife is not agreeable to bringing form in today  Patient states that he will need to call back  Patient called back and advised that his neighbor will bring him to [facility] with the form  Form to be filled out and signed today for patient  No need to draft letter for domestic relations   Awaiting patient arrival

## 2019-12-09 NOTE — PROGRESS NOTES
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, who is returning to Neurology office for follow up of his seizures  Assessment/Plan:    Epilepsy ([facility] [address] )  Although he does report having a prior stroke and some of his prior events were concerning for generalized tonic-clonic seizures, the current episodes that he is experiencing would be a little atypical of an epileptic seizure  He will typically fall and slumped to the side as if he appears to be sleep and has some subtle shaking  Is certainly possible this could be a seizure, but there is a strong possibility these could be nonepileptic psychogenic spells  Because he is currently having side effects with irritability with levetiracetam, we discussed the possibility of changing levetiracetam to a different medication  At this point, he would prefer to get a more definitive diagnosis before making any other medication changes  --he will continue levetiracetam 1500 mg twice a day for now  --I will have him get a routine EEG, and when this is completed, we will arrange for him to be admitted to the epilepsy monitoring unit to capture and characterize his spells  While there, I would anticipate that he will have his medications weaned off to better characterize his episodes and determine if he has epilepsy    I spent a total of 25 min with the patient with greater than 50% of that time spent counseling and coordinating his care, specifically discussing his diagnosis, medications, EMU admission, and plan as detailed above     He will return to the office in about 3 months  Subjective:    HPI  Current seizure medications:  1  Levetiracetam 1500 mg twice a day  Other medications as per Epic  I last saw him in the office on 8/8/2019   At that time, he was still having events concerning for seizures, so his dose of levetiracetam was increased to his current dose  Since his last visit, he has continued to have episodes where he is confused and disoriented  He has not had any larger episodes concerning for generalized tonic-clonic seizures, but he has been having smaller episodes where he will slump over, appears if he is asleep, and then shake all over, being disoriented and confused afterwards  This happened on at least 2 occasions with most recent occurring on November 14th  He reports that he is under a lot more stress that he had been previously because of difficulties with their house  He also has continuing to have some irritability from levetiracetam   Although he reports that this is Pride Fuss, he is wife both state that is problematic in causing him some difficulties at home  He will often be very short with family members  Prior Seizure Medications: Phenytoin (quickly changed to Levetiracetam), Lamotrigine (rash)    His history was also obtained from his wife, who was present at today's visit  The following portions of the patient's history were reviewed and updated as appropriate: allergies, current medications, past medical history and problem list      Objective:    Blood pressure 160/88, pulse 88, height 5' 10 5" (1 791 m), weight 108 kg (237 lb)  Physical Exam    Neurological Exam      ROS:    Review of Systems   Constitutional: Negative  Negative for appetite change and fever  HENT: Negative  Negative for hearing loss, tinnitus, trouble swallowing and voice change  Eyes: Negative  Negative for photophobia and pain  Respiratory: Negative  Negative for shortness of breath  Cardiovascular: Negative  Negative for palpitations  Gastrointestinal: Negative  Negative for nausea and vomiting  Endocrine: Negative  Negative for cold intolerance and heat intolerance  Genitourinary: Negative  Negative for dysuria, frequency and urgency     Musculoskeletal: Negative  Negative for myalgias and neck pain  Skin: Negative  Negative for rash  Allergic/Immunologic: Negative  Neurological: Positive for seizures  Negative for dizziness, tremors, syncope, facial asymmetry, speech difficulty, weakness, light-headedness, numbness and headaches  Hematological: Negative  Does not bruise/bleed easily  Psychiatric/Behavioral: Negative  Negative for confusion, hallucinations and sleep disturbance         I personally reviewed the ROS that was entered by the medical assistant

## 2019-12-12 ENCOUNTER — OFFICE VISIT (OUTPATIENT)
Dept: NEUROLOGY | Facility: CLINIC | Age: 44
End: 2019-12-12
Payer: COMMERCIAL

## 2019-12-12 VITALS
SYSTOLIC BLOOD PRESSURE: 160 MMHG | DIASTOLIC BLOOD PRESSURE: 88 MMHG | HEIGHT: 71 IN | HEART RATE: 88 BPM | WEIGHT: 237 LBS | BODY MASS INDEX: 33.18 KG/M2

## 2019-12-12 DIAGNOSIS — G40.909 NONINTRACTABLE EPILEPSY WITHOUT STATUS EPILEPTICUS, UNSPECIFIED EPILEPSY TYPE (HCC): Primary | Chronic | ICD-10-CM

## 2019-12-12 PROCEDURE — 99214 OFFICE O/P EST MOD 30 MIN: CPT | Performed by: PSYCHIATRY & NEUROLOGY

## 2019-12-12 NOTE — PATIENT INSTRUCTIONS
-- Continue to take Levetiracetam 1500 mg twice a day  -- I will order a routine EEG and also we will help arrange for you to be admitted to the Epilepsy Monitoring Unit

## 2019-12-14 DIAGNOSIS — I10 ESSENTIAL HYPERTENSION: ICD-10-CM

## 2019-12-14 PROCEDURE — 4010F ACE/ARB THERAPY RXD/TAKEN: CPT | Performed by: INTERNAL MEDICINE

## 2019-12-14 RX ORDER — LISINOPRIL 40 MG/1
TABLET ORAL
Qty: 90 TABLET | Refills: 1 | Status: SHIPPED | OUTPATIENT
Start: 2019-12-14 | End: 2020-06-10

## 2019-12-28 NOTE — ASSESSMENT & PLAN NOTE
Although he does report having a prior stroke and some of his prior events were concerning for generalized tonic-clonic seizures, the current episodes that he is experiencing would be a little atypical of an epileptic seizure  He will typically fall and slumped to the side as if he appears to be sleep and has some subtle shaking  Is certainly possible this could be a seizure, but there is a strong possibility these could be nonepileptic psychogenic spells  Because he is currently having side effects with irritability with levetiracetam, we discussed the possibility of changing levetiracetam to a different medication  At this point, he would prefer to get a more definitive diagnosis before making any other medication changes  --he will continue levetiracetam 1500 mg twice a day for now  --I will have him get a routine EEG, and when this is completed, we will arrange for him to be admitted to the epilepsy monitoring unit to capture and characterize his spells    While there, I would anticipate that he will have his medications weaned off to better characterize his episodes and determine if he has epilepsy

## 2020-01-02 ENCOUNTER — TELEPHONE (OUTPATIENT)
Dept: NEUROLOGY | Facility: CLINIC | Age: 45
End: 2020-01-02

## 2020-01-02 DIAGNOSIS — G40.909 NONINTRACTABLE EPILEPSY WITHOUT STATUS EPILEPTICUS, UNSPECIFIED EPILEPSY TYPE (HCC): Primary | Chronic | ICD-10-CM

## 2020-01-02 NOTE — TELEPHONE ENCOUNTER
----- Message from Margarita Santana MD sent at 2019  9:12 AM EST -----  Regarding: EMU admission  Sindhu,    I would like Alamamanda Denise ( 1975 ) admitted to the EMU  I discussed timing a little with him and he would not want to be in the hospital over the holiday and is thinking of coming in after the new year  I am ordering another routine EEG now, since his most recent prior was on 2018  Please help arrange admission  Thanks!     Saul Sullivan

## 2020-01-02 NOTE — TELEPHONE ENCOUNTER
[facility] scheduled for 1/16/20      Called and Left a message on pt's answering machine for a call back

## 2020-01-16 ENCOUNTER — HOSPITAL ENCOUNTER (OUTPATIENT)
Dept: NEUROLOGY | Facility: HOSPITAL | Age: 45
Discharge: HOME | End: 2020-01-16
Attending: PSYCHIATRY & NEUROLOGY
Payer: COMMERCIAL

## 2020-01-16 DIAGNOSIS — G40.909 NONINTRACTABLE EPILEPSY WITHOUT STATUS EPILEPTICUS, UNSPECIFIED EPILEPSY TYPE (HCC): ICD-10-CM

## 2020-01-16 PROCEDURE — 95816 EEG AWAKE AND DROWSY: CPT

## 2020-01-16 PROCEDURE — 95816 EEG AWAKE AND DROWSY: CPT | Performed by: PSYCHIATRY & NEUROLOGY

## 2020-01-21 ENCOUNTER — TELEPHONE (OUTPATIENT)
Dept: INTERNAL MEDICINE CLINIC | Facility: CLINIC | Age: 45
End: 2020-01-21

## 2020-02-03 NOTE — TELEPHONE ENCOUNTER
Pt called in asking for address of EMU and arrival instructions  Pt provided with this information  Pt states that he did not receive the information sheet in mail and had some additional questions for Sindhu  Did explain that Sindhu was not available at the moment  Please follow up with pt in the AM  Thanks!     [phone]

## 2020-02-04 NOTE — TELEPHONE ENCOUNTER
Spoke with patient and answered his questions for tomorrow  No additional questions or concerns at this time

## 2020-02-10 ENCOUNTER — TELEPHONE (OUTPATIENT)
Dept: NEUROLOGY | Facility: CLINIC | Age: 45
End: 2020-02-10

## 2020-02-10 NOTE — TELEPHONE ENCOUNTER
Received FMLA- In the Court [place] Common Ple[place] of [place], [place] form to be filled out by Dr Sean Booth  Collected  form fee of $15 00 by credit card  Forms are scanned in chart and routed to the  Nurses in [facility] to be filled out      Please review cover page for specific directions regarding the fact that the original forms needs to be filled out and return  NOT A COPY      The original forms have being sent interoffice mail      Forms are scanned in Registration under Documents and other  FMLA    PATIENT STATED TO PLEASE CALL HIM WHEN FORMS ARE FILLED OUT  HE WILL PICK THEM UP  PLEASE DO NOT FAX        A copy of previous form has being scanned in chart to follow       Thank you

## 2020-02-14 NOTE — TELEPHONE ENCOUNTER
ERINN    Patient calling in asking about forms  I checked with Sindhu  Our office has not received the forms yet  He wants to make note that the forms are due 2/19  He can  the forms in the [place] office  He will check in next week

## 2020-02-14 NOTE — TELEPHONE ENCOUNTER
Please let me or the clinical team(if I am not in the office) know when we have received the forms  Thank you!

## 2020-02-18 NOTE — TELEPHONE ENCOUNTER
Patient called regarding his FMLA- In the [address] [place] [address] He stated he called [facility] and they stated the have not received any paper work for Dr Marley Conn     The original forms have being sent interoffice mail      Gave patient Elen's Cell phone number

## 2020-02-20 NOTE — TELEPHONE ENCOUNTER
Patient brought in another form which Dr Anant Werner signed and the  returned to the patient  He was informed by staff that he should either mail the form directly to the [place] office or drop it off at the [place] office and that he needs to allow for the 2 week completion time as outlined by office policy

## 2020-02-20 NOTE — TELEPHONE ENCOUNTER
I have not seen the forms  This is the second time they have been out in interoffice mail without knowledge of where they are

## 2020-03-27 ENCOUNTER — TELEPHONE (OUTPATIENT)
Dept: NEUROLOGY | Facility: CLINIC | Age: 45
End: 2020-03-27

## 2020-03-27 NOTE — TELEPHONE ENCOUNTER
Left message to offer patient a virtual visit for his upcoming appointment with Deborrah Severance on 4/02/2020

## 2020-04-02 ENCOUNTER — TELEPHONE (OUTPATIENT)
Dept: NEUROLOGY | Facility: CLINIC | Age: 45
End: 2020-04-02

## 2020-04-02 ENCOUNTER — TELEMEDICINE (OUTPATIENT)
Dept: NEUROLOGY | Facility: CLINIC | Age: 45
End: 2020-04-02
Payer: COMMERCIAL

## 2020-04-02 DIAGNOSIS — G40.909 NONINTRACTABLE EPILEPSY WITHOUT STATUS EPILEPTICUS, UNSPECIFIED EPILEPSY TYPE (HCC): Primary | Chronic | ICD-10-CM

## 2020-04-02 PROCEDURE — 99213 OFFICE O/P EST LOW 20 MIN: CPT | Performed by: PSYCHIATRY & NEUROLOGY

## 2020-04-02 RX ORDER — OXCARBAZEPINE 150 MG/1
TABLET, FILM COATED ORAL
Qty: 180 TABLET | Refills: 11 | Status: SHIPPED | OUTPATIENT
Start: 2020-04-02 | End: 2020-11-16

## 2020-04-29 DIAGNOSIS — E11.51 TYPE 2 DIABETES MELLITUS WITH DIABETIC PERIPHERAL ANGIOPATHY WITHOUT GANGRENE, WITHOUT LONG-TERM CURRENT USE OF INSULIN (HCC): Chronic | ICD-10-CM

## 2020-04-29 RX ORDER — SITAGLIPTIN 100 MG/1
TABLET, FILM COATED ORAL
Qty: 90 TABLET | Refills: 1 | OUTPATIENT
Start: 2020-04-29

## 2020-05-01 ENCOUNTER — TELEPHONE (OUTPATIENT)
Dept: INTERNAL MEDICINE CLINIC | Facility: CLINIC | Age: 45
End: 2020-05-01

## 2020-05-01 DIAGNOSIS — E11.51 TYPE 2 DIABETES MELLITUS WITH DIABETIC PERIPHERAL ANGIOPATHY WITHOUT GANGRENE, WITHOUT LONG-TERM CURRENT USE OF INSULIN (HCC): Primary | Chronic | ICD-10-CM

## 2020-05-04 ENCOUNTER — TELEMEDICINE (OUTPATIENT)
Dept: INTERNAL MEDICINE CLINIC | Facility: CLINIC | Age: 45
End: 2020-05-04
Payer: COMMERCIAL

## 2020-05-04 VITALS
SYSTOLIC BLOOD PRESSURE: 149 MMHG | DIASTOLIC BLOOD PRESSURE: 100 MMHG | HEIGHT: 71 IN | BODY MASS INDEX: 33.74 KG/M2 | WEIGHT: 241 LBS

## 2020-05-04 DIAGNOSIS — I10 ESSENTIAL HYPERTENSION: Chronic | ICD-10-CM

## 2020-05-04 DIAGNOSIS — Z11.4 SCREENING FOR HIV (HUMAN IMMUNODEFICIENCY VIRUS): ICD-10-CM

## 2020-05-04 DIAGNOSIS — E11.51 TYPE 2 DIABETES MELLITUS WITH DIABETIC PERIPHERAL ANGIOPATHY WITHOUT GANGRENE, WITHOUT LONG-TERM CURRENT USE OF INSULIN (HCC): Primary | Chronic | ICD-10-CM

## 2020-05-04 DIAGNOSIS — G40.909 NONINTRACTABLE EPILEPSY WITHOUT STATUS EPILEPTICUS, UNSPECIFIED EPILEPSY TYPE (HCC): Chronic | ICD-10-CM

## 2020-05-04 PROCEDURE — 99214 OFFICE O/P EST MOD 30 MIN: CPT | Performed by: INTERNAL MEDICINE

## 2020-05-19 ENCOUNTER — TELEPHONE (OUTPATIENT)
Dept: NEUROLOGY | Facility: CLINIC | Age: 45
End: 2020-05-19

## 2020-06-02 DIAGNOSIS — E11.51 TYPE 2 DIABETES MELLITUS WITH DIABETIC PERIPHERAL ANGIOPATHY WITHOUT GANGRENE, WITHOUT LONG-TERM CURRENT USE OF INSULIN (HCC): Primary | ICD-10-CM

## 2020-06-02 RX ORDER — ATORVASTATIN CALCIUM 20 MG/1
20 TABLET, FILM COATED ORAL DAILY
Qty: 90 TABLET | Refills: 3 | Status: SHIPPED | OUTPATIENT
Start: 2020-06-02 | End: 2021-06-09

## 2020-06-05 ENCOUNTER — TELEPHONE (OUTPATIENT)
Dept: NEUROLOGY | Facility: CLINIC | Age: 45
End: 2020-06-05

## 2020-06-08 DIAGNOSIS — G40.909 NONINTRACTABLE EPILEPSY WITHOUT STATUS EPILEPTICUS, UNSPECIFIED EPILEPSY TYPE (HCC): Primary | Chronic | ICD-10-CM

## 2020-06-08 RX ORDER — OXCARBAZEPINE 600 MG/1
600 TABLET, FILM COATED ORAL EVERY 12 HOURS SCHEDULED
Qty: 60 TABLET | Refills: 5 | Status: SHIPPED | OUTPATIENT
Start: 2020-06-08 | End: 2020-11-16 | Stop reason: SDUPTHER

## 2020-06-10 DIAGNOSIS — I10 ESSENTIAL HYPERTENSION: ICD-10-CM

## 2020-06-10 PROCEDURE — 4010F ACE/ARB THERAPY RXD/TAKEN: CPT | Performed by: INTERNAL MEDICINE

## 2020-06-10 RX ORDER — LISINOPRIL 40 MG/1
TABLET ORAL
Qty: 90 TABLET | Refills: 1 | Status: SHIPPED | OUTPATIENT
Start: 2020-06-10 | End: 2020-10-02

## 2020-08-05 ENCOUNTER — TELEPHONE (OUTPATIENT)
Dept: INTERNAL MEDICINE CLINIC | Facility: CLINIC | Age: 45
End: 2020-08-05

## 2020-08-05 DIAGNOSIS — G40.909 NONINTRACTABLE EPILEPSY WITHOUT STATUS EPILEPTICUS, UNSPECIFIED EPILEPSY TYPE (HCC): Primary | Chronic | ICD-10-CM

## 2020-08-05 NOTE — TELEPHONE ENCOUNTER
Pavel Ashley from Neurology called, needs physician order to be in epic before 8/13 appt    [phone] [phone]

## 2020-08-10 NOTE — PROGRESS NOTES
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, who is returning to Neurology office for follow up of his seizures  Assessment/Plan:    Epilepsy ([facility] [address] )  He continues to have events concerning for seizures  The description he and his wife give today are concerning for focal unaware seizures, but as noted before, it is unclear if his events are epileptic seizures or non-epileptic spells  He did not actually start the Oxcarbazepine  I stressed the importance of starting on oxcarbazepine, as it is dangerous to continue having uncontrolled seizures  Overall, the risk of the medication causing bad side effects which would leading to hospitalization is quite low, so I would be most concerned about starting the oxcarbazepine at this point  Certainly would be more concerned that he may have uncontrolled seizures which could lead injury and hospitalization  --he will continue levetiracetam 1500 mg twice a day  --I will have him start taking oxcarbazepine 150 mg twice a day, and increase every week until he gets to the goal dose of 450 mg twice a day  If he continues to have seizures at this dose, I will have him then increase further to 600 mg twice a day  --it will be best for us to optimize his dose of oxcarbazepine  If he continues to have these events despite optimal doses of oxcarbazepine, it would be best for him to be admitted to the epilepsy monitoring unit to capture and characterize his episodes  Certainly they do improved to be focal unaware seizures, at that point he would be medically refractory, and may benefit from pre-surgical evaluation    Essential hypertension  His blood pressure was very high to them  Although he is not having any symptoms, I am concerned that his blood pressure is too high    I asked him to call his primary care doctor as soon as he leaves the office to arrange an appointment today or tomorrow  If he is not able to see his PCP today or tomorrow, I did recommend that he go to urgent care to have his blood pressure more urgently evaluated  Given his history of a stroke and heart problems in the past, I am very concerned about his blood pressure being this high  I spent a total of 25 min with the patient with greater than 50% of that time spent counseling and coordinating his care, specifically discussing his diagnosis, medications, blood pressure, need to see his PCP urgently, and plan, as detailed above     He will Return in about 3 months (around 11/13/2020)  Subjective:    HPI  Current seizure medications:  1  Levetiracetam 1500 mg twice a day  Other medications as per Epic  Since his last visit, he did not start taking Oxcarbazepine out of concern for a bad reaction  He is still having seizures  These are similar to before where he will get a weird feeling, like he is warm/happy  It is almost comforting, so he will go into a bigger event  With a bigger seizure, he will appear to be functioning, but will lose memory for a long chunk of time  He will have a tremor and shake, his eyes will roll up, and constant chew motion, which will last for about 2-3 minutes  He at times will be confused or have poor memory afterward for a long period of time  Since July 15th, he has also been losing taste after the seizures as well  He reports a total of 4 seizures in the last 2 months  Although he had called into the office in June because of a seizure, and said he was taking Oxcarbazepine, this wasn't actually true, as he never started it  Prior Seizure Medications: Phenytoin, Lamotrigine    His history was also obtained from his wife, who was present at today's visit       The following portions of the patient's history were reviewed and updated as appropriate: allergies, current medications, past medical history and problem list      Objective:    Blood pressure (!) 200/110, pulse 88, temperature 98 7 °F (37 1 °C), height 5' 10 5" (1 791 m), weight 110 kg (242 lb)  I did recheck his BP manually in the left arm, sitting, and this was 198/122  Physical Exam    Neurological Exam      ROS:    Review of Systems   Constitutional: Negative  Negative for appetite change and fever  HENT: Negative  Negative for hearing loss, tinnitus, trouble swallowing and voice change  Eyes: Negative  Negative for photophobia and pain  Respiratory: Negative  Negative for shortness of breath  Cardiovascular: Negative  Negative for palpitations  Gastrointestinal: Negative  Negative for nausea and vomiting  Endocrine: Negative  Negative for cold intolerance  Genitourinary: Negative  Negative for dysuria, frequency and urgency  Musculoskeletal: Negative  Negative for myalgias and neck pain  Skin: Negative  Negative for rash  Allergic/Immunologic: Negative  Neurological: Positive for seizures (loss of taste)  Negative for dizziness, tremors, syncope, facial asymmetry, speech difficulty, weakness, light-headedness, numbness and headaches  Hematological: Negative  Does not bruise/bleed easily  Psychiatric/Behavioral: Negative  Negative for confusion, hallucinations and sleep disturbance         I personally reviewed the review of systems that was entered by the medical assistant

## 2020-08-13 ENCOUNTER — OFFICE VISIT (OUTPATIENT)
Dept: NEUROLOGY | Facility: CLINIC | Age: 45
End: 2020-08-13
Payer: COMMERCIAL

## 2020-08-13 VITALS
BODY MASS INDEX: 33.88 KG/M2 | WEIGHT: 242 LBS | SYSTOLIC BLOOD PRESSURE: 200 MMHG | HEART RATE: 88 BPM | TEMPERATURE: 98.7 F | DIASTOLIC BLOOD PRESSURE: 110 MMHG | HEIGHT: 71 IN

## 2020-08-13 DIAGNOSIS — G40.909 NONINTRACTABLE EPILEPSY WITHOUT STATUS EPILEPTICUS, UNSPECIFIED EPILEPSY TYPE (HCC): Chronic | ICD-10-CM

## 2020-08-13 DIAGNOSIS — I10 ESSENTIAL HYPERTENSION: Primary | Chronic | ICD-10-CM

## 2020-08-13 PROCEDURE — 1036F TOBACCO NON-USER: CPT | Performed by: PSYCHIATRY & NEUROLOGY

## 2020-08-13 PROCEDURE — 3080F DIAST BP >= 90 MM HG: CPT | Performed by: PSYCHIATRY & NEUROLOGY

## 2020-08-13 PROCEDURE — 3008F BODY MASS INDEX DOCD: CPT | Performed by: PSYCHIATRY & NEUROLOGY

## 2020-08-13 PROCEDURE — 99214 OFFICE O/P EST MOD 30 MIN: CPT | Performed by: PSYCHIATRY & NEUROLOGY

## 2020-08-13 PROCEDURE — 3077F SYST BP >= 140 MM HG: CPT | Performed by: PSYCHIATRY & NEUROLOGY

## 2020-08-13 RX ORDER — LEVETIRACETAM 500 MG/1
TABLET ORAL
Qty: 180 TABLET | Refills: 11 | Status: SHIPPED | OUTPATIENT
Start: 2020-08-13 | End: 2021-08-20

## 2020-08-13 NOTE — ASSESSMENT & PLAN NOTE
His blood pressure was very high to them  Although he is not having any symptoms, I am concerned that his blood pressure is too high  I asked him to call his primary care doctor as soon as he leaves the office to arrange an appointment today or tomorrow  If he is not able to see his PCP today or tomorrow, I did recommend that he go to urgent care to have his blood pressure more urgently evaluated  Given his history of a stroke and heart problems in the past, I am very concerned about his blood pressure being this high

## 2020-08-13 NOTE — ASSESSMENT & PLAN NOTE
He continues to have events concerning for seizures  The description he and his wife give today are concerning for focal unaware seizures, but as noted before, it is unclear if his events are epileptic seizures or non-epileptic spells  He did not actually start the Oxcarbazepine  I stressed the importance of starting on oxcarbazepine, as it is dangerous to continue having uncontrolled seizures  Overall, the risk of the medication causing bad side effects which would leading to hospitalization is quite low, so I would be most concerned about starting the oxcarbazepine at this point  Certainly would be more concerned that he may have uncontrolled seizures which could lead injury and hospitalization  --he will continue levetiracetam 1500 mg twice a day  --I will have him start taking oxcarbazepine 150 mg twice a day, and increase every week until he gets to the goal dose of 450 mg twice a day  If he continues to have seizures at this dose, I will have him then increase further to 600 mg twice a day  --it will be best for us to optimize his dose of oxcarbazepine  If he continues to have these events despite optimal doses of oxcarbazepine, it would be best for him to be admitted to the epilepsy monitoring unit to capture and characterize his episodes    Certainly they do improved to be focal unaware seizures, at that point he would be medically refractory, and may benefit from pre-surgical evaluation

## 2020-08-13 NOTE — PATIENT INSTRUCTIONS
-- Continue to take Levetiracetam unchanged     -- Please start taking Oxcarbazepine 150 mg twice a day for 1 week,  - Then take Oxcarbazepine 300 mg twice a day for 1 week,   - Then take Oxcarbazepine 450 mg twice a day    If you have an additional seizure on this dose of Oxcarbazepine, then please increase it up to 600 mg twice a day    If the seizures continue, it may be best to arrange for you to be admitted to the Epilepsy Monitoring Unit  -- Please call your PCP after this appointment to recheck your blood pressure and adjust your medications  If your PCP cannot see you today or tomorrow morning, I would recommend going to urgent care  If you have any trouble with headaches, confusion, numbness, weakness, chest pains, or other concerning symptoms, please call 911 and go to the nearest ED

## 2020-08-14 ENCOUNTER — TELEPHONE (OUTPATIENT)
Dept: INTERNAL MEDICINE CLINIC | Facility: CLINIC | Age: 45
End: 2020-08-14

## 2020-08-14 DIAGNOSIS — I10 ESSENTIAL HYPERTENSION: Primary | Chronic | ICD-10-CM

## 2020-08-14 RX ORDER — AMLODIPINE BESYLATE 5 MG/1
5 TABLET ORAL DAILY
Qty: 90 TABLET | Refills: 1 | Status: SHIPPED | OUTPATIENT
Start: 2020-08-14 | End: 2021-02-06 | Stop reason: SDUPTHER

## 2020-08-14 RX ORDER — HYDROCHLOROTHIAZIDE 12.5 MG/1
12.5 TABLET ORAL DAILY
Qty: 90 TABLET | Refills: 1 | Status: SHIPPED | OUTPATIENT
Start: 2020-08-14 | End: 2021-01-31

## 2020-08-14 NOTE — TELEPHONE ENCOUNTER
I would prefer that he comes to the office so I could see him in person and check his blood pressure

## 2020-08-14 NOTE — TELEPHONE ENCOUNTER
Patient was at Neurologist office who told patient to call PCP  BP was 200/120  Patient states earlier today it is 183/121  Patient states he was taken off one of the blood pressure medications and now only takes Lisinopril 40 mg tablet       Advise patient

## 2020-08-14 NOTE — TELEPHONE ENCOUNTER
CALLED PT   AND WAS NOTIFIED AND STATED HE WILL TRY HIS BEST TO GET HERE, AND ALSO STATED HIS BP IS GOING DOWN SINCE HE TOOK 2 SONY ASA

## 2020-08-17 ENCOUNTER — TELEPHONE (OUTPATIENT)
Dept: INTERNAL MEDICINE CLINIC | Facility: CLINIC | Age: 45
End: 2020-08-17

## 2020-08-18 ENCOUNTER — APPOINTMENT (OUTPATIENT)
Dept: LAB | Facility: CLINIC | Age: 45
End: 2020-08-18
Payer: COMMERCIAL

## 2020-08-18 ENCOUNTER — TELEPHONE (OUTPATIENT)
Dept: INTERNAL MEDICINE CLINIC | Facility: CLINIC | Age: 45
End: 2020-08-18

## 2020-08-18 ENCOUNTER — OFFICE VISIT (OUTPATIENT)
Dept: INTERNAL MEDICINE CLINIC | Facility: CLINIC | Age: 45
End: 2020-08-18
Payer: COMMERCIAL

## 2020-08-18 VITALS
BODY MASS INDEX: 34.7 KG/M2 | SYSTOLIC BLOOD PRESSURE: 128 MMHG | TEMPERATURE: 98.3 F | OXYGEN SATURATION: 98 % | DIASTOLIC BLOOD PRESSURE: 88 MMHG | HEIGHT: 70 IN | HEART RATE: 96 BPM | WEIGHT: 242.4 LBS

## 2020-08-18 DIAGNOSIS — Z11.4 SCREENING FOR HIV (HUMAN IMMUNODEFICIENCY VIRUS): ICD-10-CM

## 2020-08-18 DIAGNOSIS — G40.909 NONINTRACTABLE EPILEPSY WITHOUT STATUS EPILEPTICUS, UNSPECIFIED EPILEPSY TYPE (HCC): Chronic | ICD-10-CM

## 2020-08-18 DIAGNOSIS — Z86.73 HISTORY OF CVA (CEREBROVASCULAR ACCIDENT): Chronic | ICD-10-CM

## 2020-08-18 DIAGNOSIS — I10 ESSENTIAL HYPERTENSION: Chronic | ICD-10-CM

## 2020-08-18 DIAGNOSIS — G47.00 INSOMNIA, UNSPECIFIED TYPE: ICD-10-CM

## 2020-08-18 DIAGNOSIS — E11.51 TYPE 2 DIABETES MELLITUS WITH DIABETIC PERIPHERAL ANGIOPATHY WITHOUT GANGRENE, WITHOUT LONG-TERM CURRENT USE OF INSULIN (HCC): Chronic | ICD-10-CM

## 2020-08-18 DIAGNOSIS — E11.51 TYPE 2 DIABETES MELLITUS WITH DIABETIC PERIPHERAL ANGIOPATHY WITHOUT GANGRENE, WITHOUT LONG-TERM CURRENT USE OF INSULIN (HCC): Primary | Chronic | ICD-10-CM

## 2020-08-18 LAB
ALBUMIN SERPL BCP-MCNC: 4.4 G/DL (ref 3.5–5)
ALP SERPL-CCNC: 82 U/L (ref 46–116)
ALT SERPL W P-5'-P-CCNC: 31 U/L (ref 12–78)
ANION GAP SERPL CALCULATED.3IONS-SCNC: 6 MMOL/L (ref 4–13)
AST SERPL W P-5'-P-CCNC: 19 U/L (ref 5–45)
BILIRUB SERPL-MCNC: 0.66 MG/DL (ref 0.2–1)
BUN SERPL-MCNC: 15 MG/DL (ref 5–25)
CALCIUM SERPL-MCNC: 8.9 MG/DL (ref 8.3–10.1)
CHLORIDE SERPL-SCNC: 110 MMOL/L (ref 100–108)
CHOLEST SERPL-MCNC: 73 MG/DL (ref 50–200)
CO2 SERPL-SCNC: 26 MMOL/L (ref 21–32)
CREAT SERPL-MCNC: 1.35 MG/DL (ref 0.6–1.3)
CREAT UR-MCNC: 135 MG/DL
ERYTHROCYTE [DISTWIDTH] IN BLOOD BY AUTOMATED COUNT: 13 % (ref 11.6–15.1)
EST. AVERAGE GLUCOSE BLD GHB EST-MCNC: 111 MG/DL
GFR SERPL CREATININE-BSD FRML MDRD: 74 ML/MIN/1.73SQ M
GLUCOSE P FAST SERPL-MCNC: 99 MG/DL (ref 65–99)
HBA1C MFR BLD: 5.5 %
HCT VFR BLD AUTO: 53.3 % (ref 36.5–49.3)
HDLC SERPL-MCNC: 32 MG/DL
HGB BLD-MCNC: 16.9 G/DL (ref 12–17)
LDLC SERPL CALC-MCNC: 29 MG/DL (ref 0–100)
MCH RBC QN AUTO: 28.1 PG (ref 26.8–34.3)
MCHC RBC AUTO-ENTMCNC: 31.7 G/DL (ref 31.4–37.4)
MCV RBC AUTO: 89 FL (ref 82–98)
MICROALBUMIN UR-MCNC: 9.2 MG/L (ref 0–20)
MICROALBUMIN/CREAT 24H UR: 7 MG/G CREATININE (ref 0–30)
NONHDLC SERPL-MCNC: 41 MG/DL
PLATELET # BLD AUTO: 104 THOUSANDS/UL (ref 149–390)
PMV BLD AUTO: 12.7 FL (ref 8.9–12.7)
POTASSIUM SERPL-SCNC: 3.7 MMOL/L (ref 3.5–5.3)
PROT SERPL-MCNC: 8.3 G/DL (ref 6.4–8.2)
RBC # BLD AUTO: 6.01 MILLION/UL (ref 3.88–5.62)
SODIUM SERPL-SCNC: 142 MMOL/L (ref 136–145)
TRIGL SERPL-MCNC: 61 MG/DL
WBC # BLD AUTO: 10.88 THOUSAND/UL (ref 4.31–10.16)

## 2020-08-18 PROCEDURE — 3079F DIAST BP 80-89 MM HG: CPT | Performed by: INTERNAL MEDICINE

## 2020-08-18 PROCEDURE — 3044F HG A1C LEVEL LT 7.0%: CPT | Performed by: INTERNAL MEDICINE

## 2020-08-18 PROCEDURE — 99214 OFFICE O/P EST MOD 30 MIN: CPT | Performed by: INTERNAL MEDICINE

## 2020-08-18 PROCEDURE — 82043 UR ALBUMIN QUANTITATIVE: CPT

## 2020-08-18 PROCEDURE — 80061 LIPID PANEL: CPT

## 2020-08-18 PROCEDURE — 80053 COMPREHEN METABOLIC PANEL: CPT

## 2020-08-18 PROCEDURE — 85027 COMPLETE CBC AUTOMATED: CPT

## 2020-08-18 PROCEDURE — 36415 COLL VENOUS BLD VENIPUNCTURE: CPT

## 2020-08-18 PROCEDURE — 87389 HIV-1 AG W/HIV-1&-2 AB AG IA: CPT

## 2020-08-18 PROCEDURE — 3074F SYST BP LT 130 MM HG: CPT | Performed by: INTERNAL MEDICINE

## 2020-08-18 PROCEDURE — 83036 HEMOGLOBIN GLYCOSYLATED A1C: CPT

## 2020-08-18 PROCEDURE — 3008F BODY MASS INDEX DOCD: CPT | Performed by: INTERNAL MEDICINE

## 2020-08-18 PROCEDURE — 82570 ASSAY OF URINE CREATININE: CPT

## 2020-08-18 PROCEDURE — 3061F NEG MICROALBUMINURIA REV: CPT | Performed by: INTERNAL MEDICINE

## 2020-08-18 PROCEDURE — 1036F TOBACCO NON-USER: CPT | Performed by: INTERNAL MEDICINE

## 2020-08-18 RX ORDER — TRAZODONE HYDROCHLORIDE 50 MG/1
50-100 TABLET ORAL
Qty: 60 TABLET | Refills: 3 | Status: SHIPPED | OUTPATIENT
Start: 2020-08-18 | End: 2020-12-17

## 2020-08-18 NOTE — PATIENT INSTRUCTIONS
DASH Eating Plan   AMBULATORY CARE:   The DASH (Dietary Approaches to Stop Hypertension) Eating Plan  is designed to help prevent or lower high blood pressure  It can also help to lower LDL (bad) cholesterol and decrease your risk for heart disease  The plan is low in sodium, sugar, unhealthy fats, and total fat  It is high in potassium, calcium, magnesium, and fiber  These nutrients are added when you eat more fruits, vegetables, and whole grains  Your sodium limit each day: Your dietitian will tell you how much sodium is safe for you to have each day  People with high blood pressure should have no more than 1,500 to 2,300 mg of sodium in a day  A teaspoon (tsp) of salt has 2,300 mg of sodium  This may seem like a difficult goal, but small changes to the foods you eat can make a big difference  Your healthcare provider or dietitian can help you create a meal plan that follows your sodium limit  How to limit sodium:   · Read food labels  Food labels can help you choose foods that are low in sodium  The amount of sodium is listed in milligrams (mg)  The % Daily Value (DV) column tells you how much of your daily needs are met by 1 serving of the food for each nutrient listed  Choose foods that have less than 5% of the DV of sodium  These foods are considered low in sodium  Foods that have 20% or more of the DV of sodium are considered high in sodium  Avoid foods that have more than 300 mg of sodium in each serving  Choose foods that say low-sodium, reduced-sodium, or no salt added on the food label  · Avoid salt  Do not salt food at the table, and add very little salt to foods during cooking  Use herbs and spices, such as onions, garlic, and salt-free seasonings to add flavor to foods  Try lemon or lime juice or vinegar to give foods a tart flavor  Use hot peppers or a small amount of hot pepper sauce to add a spicy flavor to foods  · Ask about salt substitutes    Ask your healthcare provider if you may use salt substitutes  Some salt substitutes have ingredients that can be harmful if you have certain health conditions  · Choose foods carefully at restaurants  Meals from restaurants, especially fast food restaurants, are often high in sodium  Some restaurants have nutrition information that tells you the amount of sodium in their foods  Ask to have your food prepared with less, or no salt  What you need to know about fats:   · Include healthy fats  Examples are unsaturated fats and omega-3 fatty acids  Unsaturated fats are found in soybean, canola, olive, or sunflower oil, and liquid and soft tub margarines  Omega-3 fatty acids are found in fatty fish, such as salmon, tuna, mackerel, and sardines  It is also found in flaxseed oil and ground flaxseed  · Avoid unhealthy fats  Do not eat unhealthy fats, such as saturated fats and trans fats  Saturated fats are found in foods that contain fat from animals  Examples are fatty meats, whole milk, butter, cream, and other dairy foods  It is also found in shortening, stick margarine, palm oil, and coconut oil  Trans fats are found in fried foods, crackers, chips, and baked goods made with margarine or shortening  Foods to include: With the DASH eating plan, you need to eat a certain number of servings from each food group  This will help you get enough of certain nutrients and limit others  The amount of servings you should eat depends on how many calories you need  Your dietitian can tell you how many calories you need  The number of servings listed next to the food groups below are for people who need about 2,000 calories each day    · Grains:  6 to 8 servings (3 of these servings should be whole-grain foods)    ¨ 1 slice of whole-grain bread     ¨ 1 ounce of dry cereal    ¨ ½ cup of cooked cereal, pasta, or brown rice    · Vegetables and fruits:  4 to 5 servings of fruits and 4 to 5 servings of vegetables    ¨ 1 medium fruit    ¨ ½ cup of frozen, canned (no added salt), or chopped fresh vegetables     ¨ ½ cup of fresh, frozen, dried, or canned fruit (canned in light syrup or fruit juice)    ¨ ½ cup of vegetable or fruit juice    · Dairy:  2 to 3 servings    ¨ 1 cup of nonfat (skim) or 1% milk    ¨ 1½ ounces of fat-free or low-fat cheese    ¨ 6 ounces of nonfat or low-fat yogurt    · Lean meat, poultry, and fish:  6 ounces or less    [employer] (chicken, turkey) with no skin    ¨ Fish (especially fatty fish, such as salmon, fresh tuna, or mackerel)    ¨ Lean beef and pork (loin, round, extra lean hamburger)    ¨ Egg whites and egg substitutes    · Nuts, seeds, and legumes:  4 to 5 servings each week    ¨ ½ cup of cooked beans and peas    ¨ 1½ ounces of unsalted nuts    ¨ 2 tablespoons of peanut butter or seeds    · Sweets and added sugars:  5 or less each week    ¨ 1 tablespoon of sugar, jelly, or jam    ¨ ½ cup of sorbet or gelatin    ¨ 1 cup of lemonade    · Fats:  2 to 3 servings each week    ¨ 1 teaspoon of soft margarine or vegetable oil    ¨ 1 tablespoon of mayonnaise    ¨ 2 tablespoons of salad dressing  Foods to avoid:   · Grains:      [employer], such as doughnuts, pastries, cookies, and biscuits (high in fat and sugar)    ¨ Mixes for cornbread and biscuits, packaged foods, such as bread stuffing, rice and pasta mixes, macaroni and cheese, and instant cereals (high in sodium)    · Fruits and vegetables:      ¨ Regular, canned vegetables (high in sodium)    ¨ Sauerkraut, pickled vegetables, and other foods prepared in brine (high in sodium)    ¨ Fried vegetables or vegetables in butter or high-fat sauces    ¨ Fruit in cream or butter sauce (high in fat)    · Dairy:      ¨ Whole milk, 2% milk, and cream (high in fat)    ¨ Regular cheese and processed cheese (high in fat and sodium)    · Meats and protein foods:      ¨ Smoked or cured meat, such as corned beef, nuñez, ham, hot dogs, and sausage (high in fat and sodium)    ¨ Canned beans and canned meats or spreads, such as potted meats, sardines, anchovies, and imitation seafood (high in sodium)    ¨ Deli or lunch meats, such as bologna, ham, turkey, and roast beef (high in sodium)    ¨ High-fat meat (T-bone steak, regular hamburger, and ribs)    ¨ Whole eggs and egg yolks (high in fat)    · Other:      ¨ Seasonings made with salt, such as garlic salt, celery salt, onion salt, seasoned salt, meat tenderizers, and monosodium glutamate (MSG)    ¨ Miso soup and canned or dried soup mixes (high in sodium)    ¨ Regular soy sauce, barbecue sauce, teriyaki sauce, steak sauce, Worcestershire sauce, and most flavored vinegars (high in sodium)    ¨ Regular condiments, such as mustard, ketchup, and salad dressings (high in sodium)    ¨ Gravy and sauces, such as [place] or cheese sauces (high in sodium and fat)    ¨ Drinks high in sugar, such as soda or fruit drinks    [employer] foods, such as salted chips, popcorn, pretzels, pork rinds, salted crackers, and salted nuts    ¨ Frozen foods, such as dinners, entrees, vegetables with sauces, and breaded meats (high in sodium)  Other guidelines to follow:   · Maintain a healthy weight  Your risk for heart disease is higher if you are overweight  Your healthcare provider may suggest that you lose weight if you are overweight  You can lose weight by eating fewer calories and foods that have added sugars and fat  The DASH meal plan can help you do this  Decrease calories by eating smaller portions at each meal and fewer snacks  Ask your healthcare provider for more information about how to lose weight  · Exercise regularly  Regular exercise can help you reach or maintain a healthy weight  Regular exercise can also help decrease your blood pressure and improve your cholesterol levels  Get 30 minutes or more of moderate exercise each day of the week  To lose weight, get at least 60 minutes of exercise  Talk to your healthcare provider about the best exercise program for you      · Limit alcohol  Women should limit alcohol to 1 drink a day  Men should limit alcohol to 2 drinks a day  A drink of alcohol is 12 ounces of beer, 5 ounces of wine, or 1½ ounces of liquor  © 2017 [address] Information is for End User's use only and may not be sold, redistributed or otherwise used for commercial purposes  All illustrations and images included in CareNotes® are the copyrighted property of [employer] , [email or web address]  or [employer]  The above information is an  only  It is not intended as medical advice for individual conditions or treatments  Talk to your doctor, nurse or pharmacist before following any medical regimen to see if it is safe and effective for you

## 2020-08-18 NOTE — TELEPHONE ENCOUNTER
----- Message from Nat Flores DO sent at 8/18/2020 12:42 PM EDT -----  Call patient and let him know diabetes and cholesterol are well controlled  No problems with kidney or liver function

## 2020-08-18 NOTE — PROGRESS NOTES
[facility] [address] [place] [address]    NAME: Yohan Marques  AGE: 45 y o  SEX: male  : 1975     DATE: 2020     Assessment and Plan:     1  Type 2 diabetes mellitus with diabetic peripheral angiopathy without gangrene, without long-term current use of insulin ([facility] [address] )    Most recent A1c was 6 0 % on 2019  He is due for updated blood work  This was previously ordered and main he has had a chance to get it done yet  Advised the patient to get labs done today  Continue oral hypoglycemics as prescribed  Discussed the importance of eye and foot care  2  Essential hypertension    Blood pressure much better improved in the office today  Continue lisinopril along with amlodipine and hydrochlorothiazide  Discussed diet and lifestyle modifications  3  History of CVA (cerebrovascular accident)    Discussed the importance of secondary stroke prevention with control of blood pressure, aspirin, and atorvastatin  4  Insomnia    Patient with difficulty sleeping going on for years  Good sleep hygiene was discussed  Will try patient on trazodone  Discussed risks/benefits/side effects  - traZODone (DESYREL) 50 mg tablet; Take 1-2 tablets ( mg total) by mouth daily at bedtime  Dispense: 60 tablet; Refill: 3          Return in about 3 months (around 2020) for Follow-up  Chief Complaint:     Chief Complaint   Patient presents with    Hypertension        History of Present Illness:     Patient presents for follow-up  Very uncontrolled blood pressure recently  He was at his seizure doctor's office and an appointment was made  Before this appointment I had started him on amlodipine and hydrochlorothiazide along with lisinopril which he was already taking  He does have a history of diabetes and prior stroke  His medications are being adjusted in terms of his epilepsy  He denies any chest pain or shortness of breath    He was unsure whether he is post to be taking atorvastatin  He has been having a lot of problems with his sleep since 2016  He has difficulty falling asleep and staying asleep  Review of Systems:     Review of Systems   Constitutional: Negative for activity change, appetite change and fatigue  Respiratory: Negative for apnea, cough, chest tightness, shortness of breath and wheezing  Cardiovascular: Negative for chest pain, palpitations and leg swelling  Gastrointestinal: Negative for abdominal distention, abdominal pain, blood in stool, constipation, diarrhea, nausea and vomiting  Neurological: Positive for seizures  Negative for dizziness, weakness, light-headedness, numbness and headaches  Psychiatric/Behavioral: Positive for sleep disturbance  Negative for behavioral problems, confusion, hallucinations and suicidal ideas  The patient is not nervous/anxious  Objective:     /88 (BP Location: Left arm, Patient Position: Sitting, Cuff Size: Standard)   Pulse 96   Temp 98 3 °F (36 8 °C) (Temporal) Comment: NO NSAIDS  Ht 5' 9 75" (1 772 m)   Wt 110 kg (242 lb 6 4 oz)   SpO2 98%   BMI 35 03 kg/m²     Physical Exam  Vitals signs reviewed  Constitutional:       General: He is not in acute distress  Appearance: He is well-developed  He is not diaphoretic  Neck:      Musculoskeletal: Neck supple  Thyroid: No thyromegaly  Vascular: No JVD  Cardiovascular:      Rate and Rhythm: Normal rate and regular rhythm  Pulses: Pulses are weak  Dorsalis pedis pulses are 1+ on the right side and 1+ on the left side  Posterior tibial pulses are 1+ on the right side and 1+ on the left side  Heart sounds: Normal heart sounds  No murmur  Pulmonary:      Effort: Pulmonary effort is normal  No respiratory distress  Breath sounds: Normal breath sounds  No wheezing or rales  Abdominal:      General: Bowel sounds are normal  There is no distension  Palpations: Abdomen is soft  Tenderness: There is no abdominal tenderness  Musculoskeletal:      Right lower leg: No edema  Left lower leg: No edema  Feet:      Right foot:      Skin integrity: No ulcer, skin breakdown, erythema, warmth, callus or dry skin  Left foot:      Skin integrity: No ulcer, skin breakdown, erythema, warmth, callus or dry skin  Lymphadenopathy:      Cervical: No cervical adenopathy  Skin:     General: Skin is warm and dry  Neurological:      Mental Status: He is alert  Psychiatric:         Mood and Affect: Mood normal          Behavior: Behavior normal      Diabetic Foot Exam  Patient's shoes and socks removed  Right Foot/Ankle   Right Foot Inspection  Skin Exam: skin normal and skin intact no dry skin, no warmth, no callus, no erythema, no maceration, no abnormal color, no pre-ulcer, no ulcer and no callus                          Toe Exam: ROM and strength within normal limits  Sensory     Proprioception: intact   Monofilament testing: intact  Vascular  Capillary refills: < 3 seconds  The right DP pulse is 1+  The right PT pulse is 1+  Left Foot/Ankle  Left Foot Inspection  Skin Exam: skin normal and skin intactno dry skin, no warmth, no erythema, no maceration, normal color, no pre-ulcer, no ulcer and no callus                         Toe Exam: ROM and strength within normal limits                   Sensory     Proprioception: intact  Monofilament: intact  Vascular  Capillary refills: < 3 seconds  The left DP pulse is 1+  The left PT pulse is 1+  Assign Risk Category:  No deformity present;  No loss of protective sensation; Weak pulses       Risk: 0      Lacy Wang DO  MEDICAL [id number]

## 2020-08-19 LAB — HIV 1+2 AB+HIV1 P24 AG SERPL QL IA: NORMAL

## 2020-08-27 ENCOUNTER — TELEPHONE (OUTPATIENT)
Dept: NEUROLOGY | Facility: CLINIC | Age: 45
End: 2020-08-27

## 2020-08-29 DIAGNOSIS — E11.51 TYPE 2 DIABETES MELLITUS WITH DIABETIC PERIPHERAL ANGIOPATHY WITHOUT GANGRENE, WITHOUT LONG-TERM CURRENT USE OF INSULIN (HCC): Chronic | ICD-10-CM

## 2020-08-30 RX ORDER — BLOOD SUGAR DIAGNOSTIC
STRIP MISCELLANEOUS
Qty: 100 EACH | Refills: 3 | Status: SHIPPED | OUTPATIENT
Start: 2020-08-30 | End: 2020-12-17

## 2020-10-02 DIAGNOSIS — I10 ESSENTIAL HYPERTENSION: ICD-10-CM

## 2020-10-02 RX ORDER — LISINOPRIL 40 MG/1
TABLET ORAL
Qty: 90 TABLET | Refills: 1 | Status: SHIPPED | OUTPATIENT
Start: 2020-10-02 | End: 2021-05-22

## 2020-11-02 ENCOUNTER — TELEPHONE (OUTPATIENT)
Dept: NEUROLOGY | Facility: CLINIC | Age: 45
End: 2020-11-02

## 2020-11-15 ENCOUNTER — HOSPITAL ENCOUNTER (EMERGENCY)
Facility: HOSPITAL | Age: 45
Discharge: HOME | End: 2020-11-15
Attending: EMERGENCY MEDICINE | Admitting: EMERGENCY MEDICINE
Payer: COMMERCIAL

## 2020-11-15 VITALS
DIASTOLIC BLOOD PRESSURE: 99 MMHG | HEART RATE: 73 BPM | RESPIRATION RATE: 20 BRPM | SYSTOLIC BLOOD PRESSURE: 137 MMHG | TEMPERATURE: 98.3 F | OXYGEN SATURATION: 99 %

## 2020-11-15 DIAGNOSIS — K02.9 DENTAL CARIES: Primary | ICD-10-CM

## 2020-11-15 DIAGNOSIS — K04.7 DENTAL ABSCESS: ICD-10-CM

## 2020-11-15 PROCEDURE — 99284 EMERGENCY DEPT VISIT MOD MDM: CPT | Performed by: EMERGENCY MEDICINE

## 2020-11-15 PROCEDURE — 99282 EMERGENCY DEPT VISIT SF MDM: CPT

## 2020-11-15 RX ORDER — ACETAMINOPHEN 325 MG/1
975 TABLET ORAL ONCE
Status: COMPLETED | OUTPATIENT
Start: 2020-11-15 | End: 2020-11-15

## 2020-11-15 RX ORDER — NAPROXEN 250 MG/1
500 TABLET ORAL ONCE
Status: COMPLETED | OUTPATIENT
Start: 2020-11-15 | End: 2020-11-15

## 2020-11-15 RX ORDER — NAPROXEN 500 MG/1
500 TABLET ORAL 2 TIMES DAILY WITH MEALS
Qty: 30 TABLET | Refills: 0 | Status: ON HOLD | OUTPATIENT
Start: 2020-11-15 | End: 2021-06-30

## 2020-11-15 RX ORDER — CLINDAMYCIN HYDROCHLORIDE 150 MG/1
600 CAPSULE ORAL ONCE
Status: COMPLETED | OUTPATIENT
Start: 2020-11-15 | End: 2020-11-15

## 2020-11-15 RX ORDER — CLINDAMYCIN HYDROCHLORIDE 300 MG/1
300 CAPSULE ORAL 4 TIMES DAILY
Qty: 40 CAPSULE | Refills: 0 | Status: SHIPPED | OUTPATIENT
Start: 2020-11-15 | End: 2021-08-02 | Stop reason: SDUPTHER

## 2020-11-15 RX ORDER — CHLORHEXIDINE GLUCONATE 0.12 MG/ML
15 RINSE ORAL 2 TIMES DAILY
Qty: 120 ML | Refills: 0 | Status: ON HOLD | OUTPATIENT
Start: 2020-11-15 | End: 2021-06-30

## 2020-11-15 RX ADMIN — CLINDAMYCIN HYDROCHLORIDE 600 MG: 150 CAPSULE ORAL at 13:34

## 2020-11-15 RX ADMIN — ACETAMINOPHEN 975 MG: 325 TABLET, FILM COATED ORAL at 13:34

## 2020-11-15 RX ADMIN — NAPROXEN 500 MG: 250 TABLET ORAL at 13:34

## 2020-11-16 ENCOUNTER — LAB (OUTPATIENT)
Dept: LAB | Facility: CLINIC | Age: 45
End: 2020-11-16
Payer: COMMERCIAL

## 2020-11-16 ENCOUNTER — OFFICE VISIT (OUTPATIENT)
Dept: NEUROLOGY | Facility: CLINIC | Age: 45
End: 2020-11-16
Payer: COMMERCIAL

## 2020-11-16 VITALS
BODY MASS INDEX: 34.79 KG/M2 | HEIGHT: 70 IN | TEMPERATURE: 97.8 F | SYSTOLIC BLOOD PRESSURE: 177 MMHG | DIASTOLIC BLOOD PRESSURE: 115 MMHG | HEART RATE: 89 BPM | WEIGHT: 243 LBS

## 2020-11-16 DIAGNOSIS — G40.909 NONINTRACTABLE EPILEPSY WITHOUT STATUS EPILEPTICUS, UNSPECIFIED EPILEPSY TYPE (HCC): Chronic | ICD-10-CM

## 2020-11-16 LAB
ANION GAP SERPL CALCULATED.3IONS-SCNC: 3 MMOL/L (ref 4–13)
BUN SERPL-MCNC: 10 MG/DL (ref 5–25)
CALCIUM SERPL-MCNC: 9.1 MG/DL (ref 8.3–10.1)
CHLORIDE SERPL-SCNC: 112 MMOL/L (ref 100–108)
CO2 SERPL-SCNC: 28 MMOL/L (ref 21–32)
CREAT SERPL-MCNC: 1.14 MG/DL (ref 0.6–1.3)
GFR SERPL CREATININE-BSD FRML MDRD: 91 ML/MIN/1.73SQ M
GLUCOSE SERPL-MCNC: 97 MG/DL (ref 65–140)
POTASSIUM SERPL-SCNC: 3.4 MMOL/L (ref 3.5–5.3)
SODIUM SERPL-SCNC: 143 MMOL/L (ref 136–145)

## 2020-11-16 PROCEDURE — 80048 BASIC METABOLIC PNL TOTAL CA: CPT

## 2020-11-16 PROCEDURE — 36415 COLL VENOUS BLD VENIPUNCTURE: CPT

## 2020-11-16 PROCEDURE — 3080F DIAST BP >= 90 MM HG: CPT | Performed by: PSYCHIATRY & NEUROLOGY

## 2020-11-16 PROCEDURE — 80177 DRUG SCRN QUAN LEVETIRACETAM: CPT

## 2020-11-16 PROCEDURE — 3077F SYST BP >= 140 MM HG: CPT | Performed by: PSYCHIATRY & NEUROLOGY

## 2020-11-16 PROCEDURE — 1036F TOBACCO NON-USER: CPT | Performed by: PSYCHIATRY & NEUROLOGY

## 2020-11-16 PROCEDURE — 80183 DRUG SCRN QUANT OXCARBAZEPIN: CPT

## 2020-11-16 PROCEDURE — 3008F BODY MASS INDEX DOCD: CPT | Performed by: PSYCHIATRY & NEUROLOGY

## 2020-11-16 PROCEDURE — 99214 OFFICE O/P EST MOD 30 MIN: CPT | Performed by: PSYCHIATRY & NEUROLOGY

## 2020-11-16 RX ORDER — OXCARBAZEPINE 600 MG/1
TABLET, FILM COATED ORAL
Qty: 90 TABLET | Refills: 11 | Status: SHIPPED | OUTPATIENT
Start: 2020-11-16 | End: 2021-02-26 | Stop reason: SDUPTHER

## 2020-11-17 ENCOUNTER — TELEPHONE (OUTPATIENT)
Dept: NEUROLOGY | Facility: CLINIC | Age: 45
End: 2020-11-17

## 2020-11-17 DIAGNOSIS — G40.909 NONINTRACTABLE EPILEPSY WITHOUT STATUS EPILEPTICUS, UNSPECIFIED EPILEPSY TYPE (HCC): Primary | ICD-10-CM

## 2020-11-18 LAB — LEVETIRACETAM SERPL-MCNC: 24.9 UG/ML (ref 10–40)

## 2020-11-19 LAB — OXCARBAZEPINE SERPL-MCNC: 15 UG/ML (ref 10–35)

## 2020-12-17 DIAGNOSIS — G47.00 INSOMNIA, UNSPECIFIED TYPE: ICD-10-CM

## 2020-12-17 DIAGNOSIS — E11.51 TYPE 2 DIABETES MELLITUS WITH DIABETIC PERIPHERAL ANGIOPATHY WITHOUT GANGRENE, WITHOUT LONG-TERM CURRENT USE OF INSULIN (HCC): Chronic | ICD-10-CM

## 2020-12-17 RX ORDER — TRAZODONE HYDROCHLORIDE 50 MG/1
50-100 TABLET ORAL
Qty: 60 TABLET | Refills: 5 | Status: SHIPPED | OUTPATIENT
Start: 2020-12-17 | End: 2021-06-19

## 2020-12-17 RX ORDER — BLOOD SUGAR DIAGNOSTIC
STRIP MISCELLANEOUS
Qty: 100 EACH | Refills: 3 | Status: SHIPPED | OUTPATIENT
Start: 2020-12-17 | End: 2021-09-21

## 2021-01-30 DIAGNOSIS — I10 ESSENTIAL HYPERTENSION: Chronic | ICD-10-CM

## 2021-01-31 RX ORDER — HYDROCHLOROTHIAZIDE 12.5 MG/1
TABLET ORAL
Qty: 30 TABLET | Refills: 5 | Status: SHIPPED | OUTPATIENT
Start: 2021-01-31 | End: 2021-07-24

## 2021-02-06 DIAGNOSIS — I10 ESSENTIAL HYPERTENSION: Chronic | ICD-10-CM

## 2021-02-06 RX ORDER — AMLODIPINE BESYLATE 5 MG/1
TABLET ORAL
Qty: 90 TABLET | Refills: 1 | Status: SHIPPED | OUTPATIENT
Start: 2021-02-06 | End: 2021-05-22

## 2021-02-26 ENCOUNTER — OFFICE VISIT (OUTPATIENT)
Dept: NEUROLOGY | Facility: CLINIC | Age: 46
End: 2021-02-26
Payer: COMMERCIAL

## 2021-02-26 VITALS
RESPIRATION RATE: 14 BRPM | WEIGHT: 244.7 LBS | HEIGHT: 71 IN | HEART RATE: 80 BPM | SYSTOLIC BLOOD PRESSURE: 142 MMHG | BODY MASS INDEX: 34.26 KG/M2 | DIASTOLIC BLOOD PRESSURE: 92 MMHG

## 2021-02-26 DIAGNOSIS — G40.909 NONINTRACTABLE EPILEPSY WITHOUT STATUS EPILEPTICUS, UNSPECIFIED EPILEPSY TYPE (HCC): Chronic | ICD-10-CM

## 2021-02-26 DIAGNOSIS — G40.109 FOCAL EPILEPSY (HCC): ICD-10-CM

## 2021-02-26 PROCEDURE — 3077F SYST BP >= 140 MM HG: CPT | Performed by: PSYCHIATRY & NEUROLOGY

## 2021-02-26 PROCEDURE — 3080F DIAST BP >= 90 MM HG: CPT | Performed by: PSYCHIATRY & NEUROLOGY

## 2021-02-26 PROCEDURE — 99215 OFFICE O/P EST HI 40 MIN: CPT | Performed by: PSYCHIATRY & NEUROLOGY

## 2021-02-26 PROCEDURE — 3008F BODY MASS INDEX DOCD: CPT | Performed by: PSYCHIATRY & NEUROLOGY

## 2021-02-26 RX ORDER — OXCARBAZEPINE 600 MG/1
TABLET, FILM COATED ORAL
Qty: 105 TABLET | Refills: 11 | Status: SHIPPED | OUTPATIENT
Start: 2021-02-26 | End: 2022-02-24 | Stop reason: SDUPTHER

## 2021-02-26 RX ORDER — CLOBETASOL PROPIONATE 0.46 MG/ML
SOLUTION TOPICAL
Status: ON HOLD | COMMUNITY
Start: 2021-01-24 | End: 2021-06-30

## 2021-02-26 NOTE — ASSESSMENT & PLAN NOTE
Based on the description of his seizures and reviewing the video that they brought with them today, his events are most concerning for focal unaware seizures  They have progressed to focal to bilateral tonic-clonic seizures in the past   Within the description with staring, unresponsiveness, and oral automatisms would raise the possibility of temporal onset to his seizures  With the video they presented today, his head was turned to the right, which would raise the possibility of left hemisphere onset, but this was not a true head deviation, it is difficult to know for sure without seeing the entirety of the seizure  either way, his seizures are at this point now medically refractory  Will be very important for us to look into all potential treatment options including the possibility of epilepsy surgery  We discussed the nature of this treatment option and the necessary pre-surgical evaluation which includes multiple tests to try to find the seizure onset zone, with the hope to be able to do a surgical resection and increase his likelihood of becoming seizure-free significantly  --for now, we will continue to work with medications  He will continue levetiracetam unchanged  I will have him increase his oxcarbazepine to be 900 mg in the morning and 1200 mg at night  He is having some mild dizziness at times, which does not appear to be related to increases in oxcarbazepine, but it will be important for us to reassess this on the higher dose  -- I will arrange for him to get a routine EEG in preparation of having him admitted to the epilepsy monitoring unit for phase 1 presurgical evaluation  I discussed the nature of this admission including that we would wean his medications while he is there to capture an adequate number of seizures to localize them for a pre-surgical evaluation    While there, I would expect that we would check blood work including medication levels, CMP,  CBC     -- I will also order neuropsychologic testing as part of his pre-surgical evaluation today  --depending on the results of these tests, he likely would benefit from getting a 3 T MRI and a PET scan  Once these tests are completed, he may be able to be discussed at epilepsy surgery Conference

## 2021-02-26 NOTE — PROGRESS NOTES
Patient ID: Yovani Almendarez is a 39 y o  male  Assessment/Plan:    Focal epilepsy ([facility] [address] )    Based on the description of his seizures and reviewing the video that they brought with them today, his events are most concerning for focal unaware seizures  They have progressed to focal to bilateral tonic-clonic seizures in the past   Within the description with staring, unresponsiveness, and oral automatisms would raise the possibility of temporal onset to his seizures  With the video they presented today, his head was turned to the right, which would raise the possibility of left hemisphere onset, but this was not a true head deviation, it is difficult to know for sure without seeing the entirety of the seizure  either way, his seizures are at this point now medically refractory  Will be very important for us to look into all potential treatment options including the possibility of epilepsy surgery  We discussed the nature of this treatment option and the necessary pre-surgical evaluation which includes multiple tests to try to find the seizure onset zone, with the hope to be able to do a surgical resection and increase his likelihood of becoming seizure-free significantly  --for now, we will continue to work with medications  He will continue levetiracetam unchanged  I will have him increase his oxcarbazepine to be 900 mg in the morning and 1200 mg at night  He is having some mild dizziness at times, which does not appear to be related to increases in oxcarbazepine, but it will be important for us to reassess this on the higher dose  -- I will arrange for him to get a routine EEG in preparation of having him admitted to the epilepsy monitoring unit for phase 1 presurgical evaluation  I discussed the nature of this admission including that we would wean his medications while he is there to capture an adequate number of seizures to localize them for a pre-surgical evaluation    While there, I would expect that we would check blood work including medication levels, CMP,  CBC     -- I will also order neuropsychologic testing as part of his pre-surgical evaluation today  --depending on the results of these tests, he likely would benefit from getting a 3 T MRI and a PET scan  Once these tests are completed, he may be able to be discussed at epilepsy surgery Conference  He will Return in about 3 months (around 5/26/2021)  Subjective:    HPI    Current seizure medications:  1  Levetiracetam 1500 mg twice a day  2  Oxcarbazepine 900 mg twice a day  Other medications as per [facility]  Since his last visit, he had another seizure on 1/13/2021  This was similar to his prior seizures  He had lost his taste, then was walking aimlessly around and was not responding  He stood and stared  The whole event lasted about 5 min  He was taking the higher dose of Oxcarbazepine at that time  I reviewed a video of the tail end of this event  On the video, he was sitting, not responding with head turned to the right and oral automatisms  His wife has noted some bilateral hand automatism in the past      He does have some occasional dizziness, but unclear if this is different with the increase in the Oxcarbazepine  Prior Medications: Phenytoin, Lamotrigine    His history was also obtained from his wife, who was present at today's visit  The following portions of the patient's history were reviewed and updated as appropriate: allergies, current medications, past medical history and problem list          Objective:    Blood pressure 142/92, pulse 80, resp  rate 14, height 5' 11" (1 803 m), weight 111 kg (244 lb 11 2 oz)  Physical Exam    Neurological Exam      ROS:    Review of Systems   Constitutional: Negative  Negative for appetite change and fever  HENT: Negative  Negative for hearing loss, tinnitus, trouble swallowing and voice change  No taste   Eyes: Negative    Negative for photophobia and pain  Respiratory: Negative  Negative for shortness of breath  Cardiovascular: Negative  Negative for palpitations  Gastrointestinal: Negative  Negative for nausea and vomiting  Endocrine: Negative  Negative for cold intolerance  Genitourinary: Negative  Negative for dysuria, frequency and urgency  Musculoskeletal: Negative  Negative for myalgias and neck pain  Skin: Negative  Negative for rash  Allergic/Immunologic: Negative  Neurological: Positive for seizures  Negative for dizziness, tremors, syncope, facial asymmetry, speech difficulty, weakness, light-headedness, numbness and headaches  Memory issues (short term)   Hematological: Negative  Does not bruise/bleed easily  Psychiatric/Behavioral: Negative  Negative for confusion, hallucinations and sleep disturbance       I personally reviewed the review of systems that was entered by the medical assistant

## 2021-03-01 ENCOUNTER — TELEPHONE (OUTPATIENT)
Dept: NEUROLOGY | Facility: CLINIC | Age: 46
End: 2021-03-01

## 2021-03-01 NOTE — TELEPHONE ENCOUNTER
Patient returning call  Discussed message with patient  States that he will call the office back to schedule EMU  He needs to coordinate with his wife as she will be taking him to EMU

## 2021-03-01 NOTE — TELEPHONE ENCOUNTER
----- Message from [facility], RN sent at 3/1/2021  8:45 AM EST -----  Regarding: FW: EMU admission    ----- Message -----  From: Naomy Del Rio MD  Sent: 2/26/2021  11:24 AM EST  To: [facility], RN, #  Subject: EMU admission                                    Hello,    I would like to admit Ramón Olvera to the EMU for a phase I presurgical evaluation  Please add him to the epilepsy surgery list      He does need a routine EEG prior to admission, which I am ordering today       Thanks,    Naomy Del Rio MD

## 2021-03-02 ENCOUNTER — TELEPHONE (OUTPATIENT)
Dept: NEUROLOGY | Facility: CLINIC | Age: 46
End: 2021-03-02

## 2021-03-02 NOTE — TELEPHONE ENCOUNTER
Neuropsychological Evaluation  [facility]  [address]  [place] [address]  P: [phone] F: [phone]    Intake Note  Date of Referral to Neuropsychology: 2/26/2021  Referring Provider: Dr Beth Reardon to conduct screening prior to scheduling neuropsychological evaluation  Spoke to patient directly to answer intake questions  Explained nature of neuropsychological evaluation  Patient is agreeable to evaluation  The following questions were answered  1 ) Does the patient have the ability to do a virtual intake? Yes    2 ) Does the patient have any problems that would limit his ability to participate in testing that requires interaction with another person, such as hearing or language impairments? No    3 ) Does the patient have any problems that would limit his ability to complete testing during one appointment that can last for more than 2 hours? (e g , severe fatigue, pain, or other debility) No    4 ) Does the patient have a preference between completing the evaluation in one session, or over the course of a few sessions? No    5 ) Does the patient have difficulties ambulating (e g , does he need a walker, cane, or wheelchair)? No    6 ) Would the patient be available for a last minute appointment if the opportunity arises? No    Referral will be reviewed by providers and we will call to schedule as appropriate  Referral is pending insurance review  Insurance company will be contacted to verify participation of providers and authorization/pre-certification for Neuropsychological evaluation  Patient has been made aware of this

## 2021-03-04 ENCOUNTER — HOSPITAL ENCOUNTER (OUTPATIENT)
Dept: NEUROLOGY | Facility: HOSPITAL | Age: 46
Discharge: HOME | End: 2021-03-04
Attending: PSYCHIATRY & NEUROLOGY
Payer: COMMERCIAL

## 2021-03-04 DIAGNOSIS — G40.909 NONINTRACTABLE EPILEPSY WITHOUT STATUS EPILEPTICUS, UNSPECIFIED EPILEPSY TYPE (HCC): ICD-10-CM

## 2021-03-04 PROCEDURE — 95816 EEG AWAKE AND DROWSY: CPT | Performed by: PSYCHIATRY & NEUROLOGY

## 2021-03-04 PROCEDURE — 95816 EEG AWAKE AND DROWSY: CPT

## 2021-03-08 NOTE — TELEPHONE ENCOUNTER
I will defer to him as far as timing  June is okay if he cannot come in any sooner  We can continue to work with medications until the EMU admission

## 2021-03-08 NOTE — TELEPHONE ENCOUNTER
Patient returning call  Gildardo Loomis that he was thinking towards the end of June for his EMU admission  Gildardo Loomis that his wife is the only one that works and he is currently watching his kids as they go through [employer] school  Gildardo Loomis that if he waits until June he would not have to worry about his wife not working to watch the kids  Would like to know if you are agreeable to this plan  Please advise

## 2021-03-08 NOTE — TELEPHONE ENCOUNTER
Message left for patient to return call to office  Kvng Rodriguez MD  P Neurology [facility] Team 1             Routine EEG is complete, which was needed to arrange EMU admission  See other notes, as he was already contacted to arrange EMU admission, but he was coordinating with his wife

## 2021-03-16 ENCOUNTER — TELEPHONE (OUTPATIENT)
Dept: NEUROLOGY | Facility: CLINIC | Age: 46
End: 2021-03-16

## 2021-03-16 NOTE — TELEPHONE ENCOUNTER
Left message asking patient if he could come in at 9:30am instead of 10:30am on 05/12 with Dr Carlos Butler  If patient agrees please put 10:30am hour slot on hold and notify me  Thank you

## 2021-03-24 ENCOUNTER — TELEPHONE (OUTPATIENT)
Dept: NEUROLOGY | Facility: CLINIC | Age: 46
End: 2021-03-24

## 2021-03-24 NOTE — TELEPHONE ENCOUNTER
Called and discussed with patient  Said that he has not noticed a change in his dizziness  Did say that he only notices this when he is in the car, but does not notice when he is doing normal activities  Also said that he is going to adjust the timing of his night time medications   Said he takes his morning meds at 5 am so he will start taking his evening meds at 5 pm  Was taking his evening meds around 7 pm

## 2021-03-24 NOTE — TELEPHONE ENCOUNTER
Patient calling to report a seizure that he had on 3/22  Gildardo Wilsonh that it was not as severe as his typical seizures  Happened about 30 minutes after taking his evening medications  Seizure was witness by his wife and said that he was starring, slight drooling, no memory, and lost his taste for about an hour  Usually he loses his taste for the entire day after a seizure      Patient is scheduled to see Dr Judy Suresh on 4/30 and he will call the office to schedule his EMU admission once he gets the official end date for school     -Oxcarbazepine to be 900 mg (1 5 tabs) in the morning and 1200 mg (2 tabs) at night   -Keppra 1,500 mg BID

## 2021-04-16 ENCOUNTER — DOCUMENTATION (OUTPATIENT)
Dept: NEUROLOGY | Facility: CLINIC | Age: 46
End: 2021-04-16

## 2021-04-16 NOTE — PROGRESS NOTES
Submitted prior authorization to [facility] 3/26/2021  Received fax back 3/31/2021  Spoke with Gissell Granado 3/30/2021 @2:04pm who confirmed authorization was approved   Scanned into chart under media   Case #[id number]

## 2021-04-24 DIAGNOSIS — E11.51 TYPE 2 DIABETES MELLITUS WITH DIABETIC PERIPHERAL ANGIOPATHY WITHOUT GANGRENE, WITHOUT LONG-TERM CURRENT USE OF INSULIN (HCC): Chronic | ICD-10-CM

## 2021-04-25 RX ORDER — SITAGLIPTIN 100 MG/1
TABLET, FILM COATED ORAL
Qty: 30 TABLET | Refills: 1 | Status: SHIPPED | OUTPATIENT
Start: 2021-04-25 | End: 2021-06-19

## 2021-04-26 ENCOUNTER — TELEPHONE (OUTPATIENT)
Dept: NEUROLOGY | Facility: CLINIC | Age: 46
End: 2021-04-26

## 2021-04-26 NOTE — TELEPHONE ENCOUNTER
Pt called office to inquire about insurance coverage for Friday 04/30/2021 appointment with Dr Ruthann Mcdonough  According to conversation between howard() and Maria Teresa Anderson on 03/30/2021(per note in chart), authorization was given for Neuropsychologcal assessment  After looking into authorization letter based on patient stating provider was out of network and [employer] was denying coverage of appointment, authorization letter was skimmed  It appears as though patient is correct that [facility] is denying coverage of testing under Dr Ruthann Mcdonough  She is out of Network with [facility], but they are approving testing with a Neuropsychologist in patients network  Wasn't sure what to tell patient regarding coverage or billing, so I stated that Maria Teresa Anderson would be contacting [facility] to determine whether testing would be covered for Friday and contact patient once she has looked into it  Pt stated best number to contact him is [phone]

## 2021-04-27 NOTE — TELEPHONE ENCOUNTER
Spoke with patient and cleared concerns about billing, patient and wife Page Nina understood   They will be here for Friday 4/30/2021 appointment

## 2021-04-30 ENCOUNTER — OFFICE VISIT (OUTPATIENT)
Dept: NEUROLOGY | Facility: CLINIC | Age: 46
End: 2021-04-30

## 2021-04-30 DIAGNOSIS — G40.909 NONINTRACTABLE EPILEPSY WITHOUT STATUS EPILEPTICUS, UNSPECIFIED EPILEPSY TYPE (HCC): Primary | ICD-10-CM

## 2021-04-30 PROCEDURE — NC001 PR NO CHARGE: Performed by: CLINICAL NEUROPSYCHOLOGIST

## 2021-04-30 PROCEDURE — NC001 PR NO CHARGE

## 2021-04-30 NOTE — LETTER
2021     Jo Gonzalez, [address]     Patient: Debbie Otoole   YOB: 1975   Date of Visit: 2021       Dear Dr Jaylen Alicia: Thank you for referring Debbie Otoole to me for evaluation  Below are my notes for this consultation  If you have questions, please do not hesitate to call me  Sincerely,        Juan Conner PSYD        CC: No Recipients  Kristina Brar  2021  4:42 PM  Sign when Signing Visit  Neuropsychological Evaluation  [facility]  [address], [address], [address] [facility]  P: [phone] F: [phone]     Patient Name: Debbie Otoole  Age: 39 y o    MRN: [id number]  : 1975 DOS: 2021    Results/Interpretation    Sources of Information:   [employer] (ACS): Test of Premorbid Functioning (TOPF)  Animal Naming Fluency  Auditory Naming Test  Gonzalez Anxiety Inventory (RUDY)  Gonzalez Depression Inventory-2 (BDI-2)  Brief Visuospatial Memory Test - Revised (BVMT-R) - Form 1  [place] Verbal Learning Test - 3 (CVLT-3)  Clinical Interview (see interview note below for history and presenting problems)  Controlled Oral Word Association Test (COWAT)  Finger Tapping Test  [place] Multiphasic Personality Inventory - 2 - Restructured Form (MMPI-2-RF)  Neuropsychological Assessment Battery (NAB) - Form 1   Selected Subtests: Naming  Quality of Life in Epilepsy- 31 Item (QoLIE-31)  Repeatable Battery for the Assessment of Neuropsychological Status (R-BANS) - Form A  Selected Subtests: Line Orientation  Mario Complex Figure Test and Recognition Trial (RCFT)  Stroop Color and Word Test (SCWT)  TOMM  Trail Making Test (TMT)  Wechsler Adult Intelligence Scale, Fourth Edition (WAIS-IV)  Selected Subtests: Block Design, Similarities, Digit Span, Matrix Reasoning, Vocabulary, Arithmetic, Symbol Search, Coding  Wechsler Memory Scale, Fourth Edition (WMS-IV):   Selected Subtests: Logical Memory I/II/Recognition, Verbal Paired Associates I/II/Recognition, Visual Reproduction I/II/Recognition/Copy  [employer] -59 Card Version (WCST-64)    Evaluation Findings:  Findings, as documented below, are presented with qualitative descriptors (adapted from the Wechsler system unless otherwise specified) based on age-appropriate normative data  For ease of readability, findings are categorized by cognitive domain with brief descriptions of the abilities targeted by tasks administered  Engagement/Symptom Validity:   Hearing/Seeing stimuli: No problems reported; Patient wore glasses throughout testing  Approach to testing: Pleasant and cooperative; Rapport was able to be established for the purposes of testing; At the outset of testing, the patient was alert and seemed slightly anxious about testing; Anxiety was perceived as easing over time but diminishing interest/engagement was also noted; The patient grasped most simple task instructions but seemed perplexed by instructions for more demanding tasks at times and benefitted from repetition; The patient sometimes seemed to stop prematurely on tasks but was receptive to redirection to continue; He was generally confident in/untroubled by his performance on testing; Affect was noted to be somewhat blunted during testing; Overall testing pace was sluggish    Fatigue and other factors: Patient was noted to demonstrate increasing fatigue as testing went on; The patient was offered breaks throughout testing but declined, expressing preference to get it done with  Formal assessment of performance validity: There were two marginal indicators on formal measures of performance validity; All others were within acceptable limits  Interpretation: Findings are thought to be grossly reliable and valid;  Some consideration should be given to the possibility that fatigue could have negatively impacted individual test performances    Premorbid/General Intellectual Functioning:   Estimate per demographic factors: Low average  Estimate per word reading test: Borderline (5 5%ile)  General intellectual ability: Borderline to low average (FSIQ = 76; GAI = 82); Discrepancy between primary verbal comprehension (VCI) and perceptual reasoning (HUMERA) indices was not found to be statistically or clinically significant (Base Rate = 36 3%) and the FSIQ is thought to be a reasonable estimate of overall intellectual functioning; There was a statistically significant index level discrepancy noted between the VCI and working memory index ([address]) that may potentially be of clinical significance (Base Rate = 10 8%); Index level comparisons were otherwise not noted to be of particular clinical significance; Index scores are as follows: VCI = 87 (19%ile); HUMERA = 82 (12%ile), WMI = 71 (3%ile), and processing speed index (PSI) = 76 (5%ile)  Interpretation: Broadly borderline to low average range functioning based upon demographic factors and performance-based measures used to estimate premorbid functioning; Findings are thought possibly inconsistent with patients self-report of developmental, academic, and vocational history; however, while potentially related, these factors cannot be equated with intellectual functioning overall   *The presence of some lower-than-expected scores on neuropsychological measures does not necessarily suggest decline from baseline cognitive functioning   *    Attention/Working Memory:   Simple auditory attention: Low average (16%ile) on a simple digit repetition task  Auditory attention/working memory: Low average (9%ile) on tasks requiring mental manipulation of digit strings; Borderline (5%ile) on a measure of mental arithmetic     Information Processing Speed:  Speeded visual scanning/sequencing: Extremely low (<1%ile) on a trail making test  Timed number-symbol matching: Borderline (2%ile)  Speeded symbol matching: Low average (16%ile)  Timed word reading: Extremely low (<1%ile)  Timed color identification: Extremely low (<1%ile)    Language:   Confrontation picture naming: Below average (24%ile; per NAB interpretive guidelines)  Auditory naming: Low average (21%ile)  Word knowledge/Verbal concept formation: Low average (16%ile) on a vocabulary task  Verbal reasoning: Average (25%ile) on a verbal concept formation task  Semantic fluency: Low average (10%ile) on category-based word fluency; Response set is notable for 1 repetition error and no rule violation/intrusion errors   Phonemic fluency: Extremely low (<1%ile) on phoneme-based word fluency; Response set is notable for limited number of responses and two rule violation errors (e g , provided proper noun responses); No intrusion or repetition errors    Visual Perception:   Visual perception and spatial orientation: Average (26-50%ile); Raw score of 16/20 on a line orientation task  Visual analysis and synthesis: Borderline (5%ile) performance on task incorporating manual manipulation of multi-colored blocks to match presented images  Visual organization/classification: Average (37%ile) on a matrix reasoning task  Perceptuo-motor abilities:   Moderate to complex figures: Average (Raw = 42/43; Base rate = 26-50%); The patients copy of a series of complex line drawings was notable for good appreciation for the gestalt of the figures with some inaccurately reproduced details (thought likely due to careless execution rather than misperception)  Complex figure: Extremely low (Raw = 22 5/36; <1%ile); His copy of a complex line drawing was notable for misrepresentation of the overall gestalt of the figure with notable distortions and omissions of fine details of the figure;  Poor score is thought due to possible misperception and/or poor visual planning; Execution is possibly somewhat careless, but this does not account for poor performance in entirety    Motor Functioning:  Gross motor speed: Low average for dominant (R) and non-dominant hand; No indications of lateralization     Memory and Learning:   Verbal:  List learning: Borderline to average range across learning trials; There were indications of benefit from repeated exposure to stimuli but learning was limited overall; Borderline (trial 5), low average (trials 2, 3, and 4) and average (trial 1) performances were noted; At best performance, the patient recalled 7/16 words from the list (trials 4 and 5/5)  List recall: Short delay - Free recall: Extremely low (2/16 words recalled)  List recall: Short delay - Cued recall: Borderline; Score was modestly improved with semantic cueing (4/16 words recalled)   List recall: Long delay - Free recall: Extremely low (2/16 words recalled)  List recall: Long delay - Cued recall: Borderline; Score was modestly improved with semantic cueing (4/16 words recalled)  List recognition: Borderline range discriminability between target and non-target words; 12/16 recognition hits; 10 false positive errors; There were indications of positive response bias overall  Story learning: Borderline (2%ile) immediate recall for a set of two short stories  Story recall: Borderline (2%ile) delayed recall for the stories; When considered relative to his borderline immediate recall performance, the patients score suggests intact retention for information learned (contrast ss = 10)  Story recognition: Borderline; The patients delayed free recall for information from the story is consistent with expectations given his borderline range performance on a recognition task (contrast ss = 9)  Word-pair learning: Borderline (2%ile) immediate recall for a set of semantically-related and unrelated words  Word-pair recall: Borderline (2%ile);  When considered relative to his borderline immediate recall performance, the patients score suggests intact retention for information learned (contrast ss = 9)  Word-pair recognition: Low average (base rate = 10-16%) performance on a yes/no recognition task for previously learned word pairs; The patients delayed free recall for word-pairs is weaker than expected given his low average range performance on a recognition task (contrast ss = 6)    Visual:  Simple shape learning: Extremely low (<1%ile) overall; There is inconsistent indication of learning across trials  Simple shape recall: Extremely low (<1%ile); Patient retained 75% from his best learning trial  Simple shape recognition: Extremely low (<1%ile) discriminability between target and non-target shapes; 4/6 recognition hits; 1 false positive error  Moderate to complex shape learning: Low average (9%ile) immediate recall for a series of increasingly complex line figures  Moderate to complex shape recall: Borderline (2%ile) delayed recall for a series of increasingly complex line figures when compared to the normative sample; When considered relative to his low average immediate recall performance, the patients score suggests some problems with information retention (contrast ss = 6)  Moderate to complex shape recognition: Low average (base rate = 17-25%ile); The patients delayed free recall for the figures is lower than expected given his low average range performance on a recognition task (contrast ss = 5)  Complex figure: Short delay: Extremely low (<1%ile)  Complex figure: Long delay: Extremely low (<1%ile)   Complex figure recognition: Average; 12/12 recognition hits; 3 false positive errors    Executive Functions:    Visual organization/classification: Average (37%ile) on a matrix reasoning task  Verbal reasoning: Average (25%ile) on a verbal concept formation task  Visual-motor sequencing/set-shifting: Trail making task requiring shifting between numbers and letters was discontinued at the time limit (300);  By the time the test was discontinued, there were 6 errors (both set-shifting and sequencing errors)  Phonemic fluency: Extremely low (<1%ile) on phoneme-based word fluency; Response set is notable for limited number of responses and 2 rule violation errors (e g , provided proper noun responses); No intrusion or repetition errors  Response inhibition/selective attention: Low average (12%ile); The patient made 0 errors on a Stroop task  Concept formation/problem solving: Low average (11-16%ile) number of categories completed on a card sorting task; Total error score was within the borderline range with indications of more perseverative errors made than non-perseverative errors     Rating Scales:    BDI-2: Patient self-report ratings on a measure used to assess symptoms commonly associated with depression suggest a minimal degree of symptomology  RUDY: Patient self-report ratings on a measure used to assess symptoms commonly associated with anxiety suggest a moderate degree of symptomology  QOLIE-31: The patients self-report on an inventory used to assess quality of life in epilepsy suggests a lower quality of life when compared to other patients with epilepsy; Areas that received lowest ratings (e g , areas that most negatively impact the patients quality of life) include social function, seizure worry, and medication effects   MMPI-2-RF: The patient completed a comprehensive self-report inventory designed to measure emotional and behavioral symptomology  The patient's score profile was notable for indications of probable over-reporting of somatic symptoms as evidenced by the assertion of a considerably larger number of somatic symptoms rarely described by individuals with genuine medical problems  Scores on the somatic symptom scales will not be interpreted  There were also indications of possible under-reporting of emotional and behavioral symptomology  As such, lack of elevation on the substantive clinical scales may not accurately reflect any experience of genuine emotional distress  Findings are interpreted with a degree of caution       Findings on the substantive clinical scales suggest some degree of thought dysfunction as characterized by a large number of unusual thought and perceptual processes  When considered in context of the patients reported history, this scale elevation is thought to reflect his reported experience of varied and persistent visual anomalies/hallucinations that could be related to his seizure history  While there were no clinically relevant score elevations on an overarching emotional/internalizing scale, there were some indications that the patient experiences symptoms of social disengagement, anhedonia, and a lack of positive emotional experiences  Summary and Interpretation:  Mr Connor Patiño is a 39 y o , right-handed, male with 11 years of education who was referred for neuropsychological evaluation by his neurology provider, Jaymie Delgado MD, for assessment of cognitive functioning in the context of a personal history that includes focal epilepsy, possible prior stroke (recent MRI negative for ischemic injury), mitral valve thrombotic mass removal (2015), essential hypertension, DM 2 without long-term use of insulin, and psoriasis  Neuropsychological evaluation is being conducted as part of a comprehensive work-up in consideration of epilepsy surgery candidacy  At the time of the present evaluation, the patient reported an approximate 5-to-6-year history of cognitive dysfunction that the patient attributes to his seizure history  Cognitive symptoms are noted to be of variable severity from one day to the next and there is no clear indication of continuous, gradual decline based on the patients description of symptoms  The patient reported numerous and varied cognitive symptoms that include recent and remote memory loss, difficulties with attention/concentration, and expressive language problems   The patient and his family described potential executive dysfunction manifesting as both cognitive and behavioral symptoms that could be worsening over time  Specifically, the patient was noted to be perseverative on ideas/topics/tasks, inflexible in his routine, and incapable of shifting attention when necessary  He was noted to be meticulously organized and to have very limited tolerance for any changes in his organizational systems  The patients family members feel that cognitive symptoms may be exacerbated by stress, but the patient does not agree with this  Emotionally, the patient denied experiences of significantly low mood and acknowledged some degree of stress at times  Other symptoms of note include reluctance to leave home (agoraphobia), preoccupation with fears of contamination (patient worries places are dirty and showers more than 1x per day), and social disengagement that is atypical of the patients previous social tendencies  He additionally reported varied and persistent interictal visual hallucinations/anomalies manifesting as well-formed figures (often of a Restoration nature; e g , angels, people praying) as well as shapes and patterns that distort his perception of space around him  The patient remains independent for ADLs and most IADLs  The patient does not drive due to his seizure history and has not held a job since 2016 due to seizures  Neuropsychological testing was completed  The overall cognitive profile is thought to be a grossly valid estimate of the patients current cognitive functioning, although the possibility of performance validity factors negatively impacting the patients performance cannot be entirely ruled out  Premorbid estimates suggest borderline to low average range functioning based on demographic factors and performance on measures that are resilient to neurological insult/injury   Estimates of premorbid functioning are viewed as being somewhat inconsistent with the patients reported developmental, academic, and vocational history which complicates interpretation of intra-individual change  However, relative to the normative sample, performance was within a normal, typically expected range across neurocognitive skill areas that include simple auditory attention, aspects of auditory working memory (with the exception of poor performance on a measure of mental arithmetic), gross motor speed (no lateralizing findings), and most measures of expressive language  The patients performance on measures of visual perception was suggestive of intact spatial orientation but weaker than expected skills related to visual analysis/synthesis  Visuoconstruction was notable for inconsistencies between measures as the patient demonstrated intact copy for a series of moderately complex line drawings but notably poor copy for a complex line figure with indications of poor visual planning/organization  Executive functioning was similarly variable with normal range visual/verbal reasoning scores, slightly weak selective attention/inhibition and concept formation scores, and extremely low scores on measures of phonemic fluency  A measure of set-shifting and sequencing was not able to be completed within a time limit and the patient made several errors prior to discontinuation  Additionally, there were indications of a tendency to make perseverative errors on a sorting task  The patients scores on measures of information processing speed were variable with some extremely low performances  The patients profile of scores on measures of learning and memory were noted to be variable  In terms of verbal measures, the patient demonstrated some limited learning with premature plateau on measures that incorporated repeated learning trials  Scores related to memory were variable with several inconsistencies between measures  Story and word-pair memory was intact relative to the patients weak initial learning scores, while word-list memory was very weak with only modest benefit from semantic and recognition cueing   In terms of visual measures, the patient demonstrated very poor learning on a measure with repeated learning trials  His immediate recall on other visual memory tasks was inconsistent with one low average score and one extremely low score  He demonstrated consistent difficulties with information retention on visual memory tasks with inconsistent benefit from recognition cueing  Emotionally, the patient endorsed a moderate degree of symptoms commonly associated with anxiety on a simple self-report measure  His response set on a more comprehensive measure of emotional and behavioral functioning was notable for possible underreporting of psychological symptoms; however, he also reported symptoms associated with thought dysfunction that likely reflect his experience of perceptual anomalies  The patients self-report on a measure of quality of life in epilepsy was suggestive of lower-than-average quality of life compared to a sample of other epilepsy patients  Mr Savannah Hein current neuropsychological profile is notable for indications of cognitive dysfunction across several cognitive skill areas that include visual perception/construction, learning/memory, complex attention, and executive functioning  Aside from his performance on measures designed to assess aspects of executive functioning, there were additional findings that would support impressions of executive dysfunction that include poor visual planning/organization on a visuoconstruction task and encoding deficits on learning tasks that can occur with executive control problems  Cognitive test results are viewed as being of limited lateralizing/localizing value with regard to potential seizure focus, and this is further complicated by the unclear nature of the patients cerebrovascular history (possible history of occipital stroke?, small frontal and parietal infarcts evidenced around the time of reported cognitive symptom onset)   While etiology is unclear, reported behavioral/psychiatric symptoms could reasonably suggest temporal lobe (particularly R) and/or frontal lobe pathology as these areas have been implicated in the development of hallucinations and obsessive-compulsive features in the context of neurological insult/injury and this would not be inconsistent with cognitive test results  Other considerations with regard to cognitive deficits evidenced on testing should be given to the impact of possible developmental factors given estimates of premorbid functioning in the borderline to low average range (though patient did not report history of LD, ID, ADHD, etc)  Additionally, medication effects, insufficient sleep quantity/quality, and questions of possible performance validity factors may also have contributed to the patients current neurocognitive profile  Recommendations:  1  Neurocognitive test findings from the present evaluation are of limited utility in the process of determining lateralization/localization of potential seizure focus  Further neurological/neuroradiological evaluation will likely be of higher yield for the purposes of localizing seizure focus  2  The patient endorsed a lower-than-average quality of life related to his epilepsy diagnosis  However, he reported additional hopes to continue management of seizures with medication alone and to not have to pursue epilepsy surgery  As such, he is encouraged to continue discussing treatment options and their associated risks/benefits with his neurology providers  He was encouraged to be open and honest in his discussion of his symptoms and fears about specific interventions with his neurology provider(s)  3  The patient reported having a limited support system and stated that he relies almost solely on his wife for emotional and physical assistance when needed   The patient is strongly encouraged to consider other, additional resources for support in case he should need assistance after surgery  He is encouraged to discuss risks of surgery with his family members/other social supports to develop plans for how he might most benefit from their ongoing involvement in his care  4  Continuation of neurological care is recommended to continue monitoring any changes in Mr Adriana Felix cognitive and/or emotional/psychiatric symptoms  5  Should the patients neurology providers desire repeat neuropsychological and for comparison purposes, re-evaluation can be conducted no sooner than one year from the time of the present evaluation (2022)     Vik Segura PsyD  Neuropsychologist  [place] Licensed Psychologist  [facility] [facility]  #[id number]    Tobias Lopez PSYD  2021  4:42 PM  Sign when Signing Visit  Neuropsychological Evaluation  [address]  [facility] [address]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

## 2021-04-30 NOTE — PROGRESS NOTES
Neuropsychological Evaluation  [facility]  [address]  [facility] [place], [place] [address]  P: [phone] F: [phone]    History and Clinical Interview    Patient Name: Connor Patiño     Age: 39 y o  MRN: [id number]   : 1975  DOS: 2021     Referral/Presenting Information:   Mr Connor Patiño is a 39 y o , right handed, male with an 11th grade level of education who presents for neuropsychological evaluation upon kind referral from his neurology provider, Jaymie Delgado MD, for assessment of cognitive functioning in the context of a personal history that includes focal epilepsy, possible prior stroke (recent MRI negative for ischemic injury), mitral valve thrombotic mass removal (), essential hypertension, DM 2 without long-term use of insulin, and psoriasis  Neuropsychological evaluation is being conducted as part of a comprehensive work-up in consideration of epilepsy surgery candidacy  The patient was accompanied to today's appointment by his wife, Tamir Thomason, and daughter, Marquita, who participated in the clinical interview with patient permission  The history, as reported below, incorporates information obtained through medical record review, patient report, and clinical observation, as well as informant report and outside record review as applicable  History of Presenting Problem(s):  Per review of records, the patient was initially seen by [facility] on 2018 for treatment of seizures since  after an electrical injury  Comprehensive history from intake with epilepsy specialist Dr Colin Waller on 2019 is as follows:  "   Briefly reviewing his history, in , he was walking in [place] and had an episode where he felt a shock, followed by trouble with function on the right side of his body and difficulty talking    He felt very weird after this happened, he was taken to local hospital, where it was felt that he likely had an electrocution injury from stepping on a manhole cover  After that, he had significant trouble feeling off balance, difficulty focusing, and overall not feeling correct  He denies any discrete episodes around this time, other than a consistent overall feeling  He was eventually seen by a neurologist for the symptoms, and was started on levetiracetam   He felt that with the addition of this, the symptoms gradually went away  He started having more discrete episodes in 2013  With his first episode, he was at work, was talking to coworkers, and became blank, was staring off, and not responsive  At some point, his levetiracetam was stopped, and in 2014, he had a larger seizure out of sleep  With this episode he became stiff all over, which shaking, fell out of bed and actually tore his left rotator cuff  He awoke in the hospital and was placed on phenytoin, but this was later changed by his PCP to levetiracetam   Despite being on levetiracetam he was still having occasional episodes both of staring and of presumed generalized tonic-clonic events at night  He is not sure what dose he was on of levetiracetam, but denies any side effects to this medication      On 12/13/2016, he had a stroke  He was at work, and was noted by his coworkers have difficulty talking, difficulty with the right side of his body  He was taken to a hospital in [place], and was found to have a left frontal infarction on MRI  Because of his prior history of a mitral valve mass versus thrombus that was removed in 2015, he was evaluated with an extensive evaluation including a transesophageal echocardiogram, which did not show any further evidence of cardiac mass  [MRI 2018 did not demonstrate evidence of ischemic injury; Notes from [facility] suggest TIA; MRI report from 2016 suggests small acute/subacute L frontal and chronic L parietal infarctions   Previous occipital stroke noted in outside records from 2016 but documentation directly related to stoke event does not appear to be available]     Since then, he has continued to have episodes of nighttime the presumed generalized tonic-clonic seizures and smaller episodes where he will blank out his blackout episodes are more common at night, but have occurred during the day  He recalls having 1 while at a swim park with his children and being in a wave pool  Fortunately there were people near him were able to keep him out of the water   "    The patient was re-started on levetiracetam (1000 mg twice a day) under the supervision of Dr Diogo Herrera on 01/22/2019 and the patient had not had any definite seizure recurrence as of 04/25/2019  Lamotrigine was added on 04/25/2019 (titration to 150 mg nightly) due to side effects of levetiracetam (irritability, insomnia, dizziness), but was discontinued due to rash  At follow-up on 08/08/2019, the patient reported multiple small spells that occurred daily but gradually slowed down and stopped  He also reported at least two "bigger seizures", one of which occurred after he received a letter notifying him that their house was in [facility] and he remained responsive with shaking all over for 8 minutes  Impressions were concerning for possible psychogenic nonepileptic spells vs  epileptic seizures vs  some combination of both  Levetiracetam was increased to 1500 mg twice per day  As of 12/12/2019, the patient reported some ongoing smaller episodes concerning for seizures (no reported generalized tonic-clonic seizures)  EEG was ordered and completed 01/16/2020 and results as below  At follow-up on 04/02/2020, the patient was noted to have ongoing episodes concerning for seizures with a major one on 02/01/2020 precipitated by a warm feeling and subsequent fall to the floor without memory for anything else that happened  EMU admission was recommended but deferred due to closure in the setting of the COVID-19 pandemic   He was prescribed oxcarbazepine (titration to 450mg twice a day) on 04/02/2020 but had not started this medication as of 08/13/2020 follow-up despite ongoing episodes and he was strongly recommended to start this medication  On 11/16/2020, the patient reported some improvement in seizures with addition of oxcarbazepine but with some ongoing episodes and dosage was increased (900 mg twice a day)  The patient had another seizure on 01/13/2021 concerning for focal unaware seizure (per video of this event) and Dr Oneyda Avilez increased his oxcarbazepine to 900 mg in the morning and 1200 mg at night  Given concerns of medically refractory epilepsy, the patient was recommended to start phase 1 of presurgical evaluation including routine EEG, EMU study, and neuropsychological assessment  Relevant Studies:   MRI Brain without contrast (11/14/2018)  "    IMPRESSION:  Normal MRI of the brain  No obvious IAC pathology on this noncontrast study   "     MRI Brain (12/20/2016)      Routine EEG (02/26/2021; Interpretation by Marcie Larios MD)  "  Shelby Barrett Interpretation: This is an abnormal 32 minutes awake and drowsy EEG due to occasional presence of low-moderate voltage 1-2 Hz delta activity  This finding may indicate focal cerebral dysfunction in the left temporal region; however, the relatively low amplitude and infrequency is not fully conclusive  If clinically indicated, a prolonged EEG study may improve diagnostic yield   "    Current Medications:     Current Outpatient Medications:     amLODIPine (NORVASC) 5 mg tablet, TAKE 1 TABLET BY MOUTH EVERY DAY, Disp: 90 tablet, Rfl: 1    atorvastatin (LIPITOR) 20 mg tablet, Take 1 tablet (20 mg total) by mouth daily, Disp: 90 tablet, Rfl: 3    betamethasone dipropionate (DIPROSONE) 0 05 % ointment, APPLY TO AREAS OF PSORIASIS ON BODY(AVIOD FACE)ONCE DAILY 3 TO 4 TIMES A WEEK AS NEEDED FOR FLARE UP, Disp: , Rfl: 2    calcipotriene (DOVONEX) 0 005 % cream, APPLY TO PSORIASIS ON DAYS THAT YOU ARE NOT USING STEROID CREAM PRN, Disp: , Rfl: 3    chlorhexidine (PERIDEX) 0 12 % solution, Apply 15 mL to the mouth or throat 2 (two) times a day, Disp: 120 mL, Rfl: 0    clobetasol (TEMOVATE) 0 05 % external solution, APPLY TWICE A DAY A FEW DAYS IF NEEDED FOR FLARES ON SCALP, Disp: , Rfl:     fluocinonide (LIDEX) 0 05 % external solution, APPLY TO SCALP 3-4 TIMES PER WEEK AS NEEDED FOR FLARE UP (AVOID FACE AND SKIN FOLDS), Disp: , Rfl:     hydrochlorothiazide (HYDRODIURIL) 12 5 mg tablet, TAKE 1 TABLET BY MOUTH EVERY DAY, Disp: 30 tablet, Rfl: 5    Januvia 100 MG tablet, TAKE 1 TABLET BY MOUTH EVERY DAY, Disp: 30 tablet, Rfl: 1    Lancets (FREESTYLE) lancets, Use as instructed (Patient taking differently: 1 each by Other route daily Twice a week), Disp: 100 each, Rfl: 0    levETIRAcetam (KEPPRA) 500 mg tablet, Take 1500 mg (3 tabs) twice a day , Disp: 180 tablet, Rfl: 11    lisinopril (ZESTRIL) 40 mg tablet, TAKE 1 TABLET BY MOUTH EVERY DAY, Disp: 90 tablet, Rfl: 1    naproxen (NAPROSYN) 500 mg tablet, Take 1 tablet (500 mg total) by mouth 2 (two) times a day with meals, Disp: 30 tablet, Rfl: 0    NIFEdipine ER (ADALAT CC) 60 MG 24 hr tablet, Take 60 mg by mouth daily, Disp: , Rfl: 3    OneTouch Ultra test strip, CHECK BLOOD SUGARS DAILY, Disp: 100 each, Rfl: 3    OXcarbazepine (TRILEPTAL) 600 mg tablet, Take 1 5 tabs (900 mg ) in the morning and 2 tabs (1200 mg) nightly , Disp: 105 tablet, Rfl: 11    traZODone (DESYREL) 50 mg tablet, TAKE 1-2 TABLETS ( MG TOTAL) BY MOUTH DAILY AT BEDTIME, Disp: 60 tablet, Rfl: 5    triamcinolone (KENALOG) 0 1 % cream, APPLY TO AREAS OF PSORIASIS ON FACE AND SKIN FOLDS ONCE DAILY 3-4 DAYS A WEEK AS NEEDED FOR FLARES, Disp: , Rfl: 3    Review of Current Symptoms:  Primary Concerns/Complaints: At the time of the present evaluation, the patient reported that he has developed some degree of cognitive dysfunction since the "height of [his] seizures" in approximately 2015 with gradual worsening over time  Most notably, the patient and his family described difficulties with both recent and remote memory that can be variable in severity from one day to the next  The patient cited that his memory for remote events often come back to him with a particular trigger (e g , a familiar smell or other environmental reminder), sometimes as instances of re-experiencing the past event (i e , feeling as though he is living the past moment in the present)  He reported that these past memories are not related to traumatic experiences and can be mundane  Symptoms of short-term memory loss were noted to include difficulties misplacing things around the house, forgetting conversations he has with others, repeating himself in conversation (unclear if due to forgetting or due to perseveration based on family description), and forgetting or mistaking names of close family members (e g , daughters)  He does not feel that he has any difficulty learning new things and actually indicated that he feels he is more capable of quickly learning new skills than he had been in the past  He denied any experience of disorientation in familiar locations outside of an acute seizure event  In terms of other cognitive symptoms, the patient endorsed difficulties with attention such that he feels unable to shift focus (gets fixated on certain things) and has no ability to concentrate on topics that he is not immediately interested in  He feels his ability to concentrate is good when it comes to topics of interest  He reported some regular experience of expressive language problems, including word-finding difficulties and word production problems that include stuttering  He reported that he will make word substitutions when writing at times  When thinking about any possibility of returning to work as a , he experiences significant fear about the 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 due to language problems   The patient denied any left vs  right confusion or symptoms of apraxia  He reported some visual anomalies that occur often, including seeing "bright orbs" and "black lines" in his vision, which typically happens when he is outside  The patient denied any experience of executive dysfunction, citing that he is meticulously organized and has no problems with planning  The patient's wife noted some significant inflexibility in the patient that has worsened over the past 5 years  She cited that the patient keeps a rigid schedule and any deviations are not tolerated well by the patient causing stress and irritability  She also noted a tendency for fixation on certain tasks or topics such that the patient seems incapable of shifting attention at times  The patient denied any clear pattern to his experience of cognitive symptoms and reported that they can be significantly variable from one day to the next  The patient's wife seemed to think that stress could play a role in the severity of the patient's cognitive symptoms, but the patient did not agree with this  Emotionally, the patient's wife feels that the patient is depressed and anxious  The patient expressed some questions about this impression  He denied significant experience of low mood, but acknowledged that he often finds he is "so focused on the next thing" that he does not feel he is present in the moment much of the time  He denied any experience of low motivation  He endorsed some degree of apathy at times, citing that this often happens when things feel "out of [his] control" (e g , his seizures)  He endorsed some degree of anhedonia  In terms of anxiety, the patient's wife reported that the patient often seems to experience stress and sometimes out of proportion to the actual situation   Particular stressors include an ongoing legal guajardo with his ex and financial difficulties in addition to the medical and functional status changes he has experienced in the context of his seizure history  The patient and his wife have noticed a relationship between stress and seizures, although they noted that not all of his seizures occur in the context of stress  There were also indications of possible agoraphobia as the patient reported significant reluctance to leave his house these days which is a significant change from his previous functioning  He has developed some preoccupation with places being "dirty" and feels he is more sensitive to feeling that his skin is dirty, sometimes showering more than once per day  He finds that he dislikes interacting with others outside of his home most of the time and attributed this to feelings of self-consciousness in the setting of his cognitive and functional status changes  When asked, the patient endorsed daily experiences of well-formed visual hallucinations that are generally of a Amish nature (e g , seeing angels, seeing people praying)  These experiences can happen at any time of day and are distressing to the patient at times  He also noted that he often sees "patterns" and "equations" in cloud formations or in the grass  He also endorsed having premonitions about future events which he notes seem to eventually come to fruition  He denied any experience of suicidal ideation or homicidal ideation  The patient continues to function independently for all ADLs and most IADLs  He does not cook, but this is not a change for the patient  He manages simple and complex chores around the house and his wife reported this is almost compulsory at times  He continues to manage his medications independently and is not missing medications due to forgetfulness  The patient's wife has taken over management of the household finances since the patient left his job in 2016  The patient does not drive due to seizures  When asked about his thoughts on the possibility of epilepsy surgery, the patient reported ambivalence   He indicated that he feels his current medication regimen is effective and seems to be continually improving his experience of seizures (feels that they are "80% improved" at the present time)  He reported that he hopes to continue to be able to manage his seizures with medications  He and his wife expressed concerns about what the surgery could mean from a cognitive and functional status perspective and it is unclear how willing they are to consider this option currently  When asked about his support network in the case of a possible surgery in the future, the patient reported that he is generally reliant on his wife as his primary source of emotional and social support  He reported that his daughters from a previous relationship (ages 12 and 25) might be available at times to assist with daily needs and emotional support, but they might not be consistently reliable for this if it were needed  Additional Symptoms:  Sensory/Motor:  Tremor/Shakiness: Yes - Occasional  Problems with walking/balance: Yes - Patient reported he has a hard time with balance; Uses walls to maintain balance; Finds he often feels afraid of falling  Problems with fine motor dexterity: Yes - Patient attributed this to numbness in fingertips  Changes in sense of smell/taste: Yes - Patient reported anosmia for several years  Changes in vision: Yes - Patient wears glasses  Changes in hearing: No  Numbness/Tingling: Yes - In bilateral hands    Physical:  Headaches: Yes - Takes OTC medications as needed with good effect  Dizziness/Vertigo: Yes - Often with palpatations  Shortness of breath: Yes - Can occur with or without exertion  Nausea: Yes - Frequent  Incontinence: No  Pain: No     Sleep:  Hours per night: Variable; 4 hrs  is typical; Patient reported some nights feeling as though he doesn't need to sleep at all;  He described this as  Being "almost manic" in nature  Difficulty falling asleep: Yes  Difficulty staying asleep: Yes  Quality of sleep: Poor since seizures  Medications: Patient does not take Trazodone due to contraindications with other medications; He currently takes Benadryl nightly to sleep; He reported that without this medication, he would be up for days on end  Snoring: Yes  Sleep apnea: Yes - Concerns for PAULA, did a sleep study within the past 6 years or so; Patient can't recall if he was recommended C-PAP  Acting out dreams: No  Daytime napping: No     Energy:   Daytime fatigue: No  Overall level of energy: Good    Appetite:   Changes from normal: Yes - Appetite is decreased overall  Odd/Unusual cravings: Yes - Patient's wife reported that he only is interested in certain things; Restricted from previous diet  Weight changes: Lost significant weight in the last 2 years (60+ lbs)    History:  Personal History:  Social:  Birthplace: [place] [address]; Grew up in the [place]  Developmental History: Noncontributory   Family of Origin: Raised by biological mother and step-father; 2 half-sisters; Biological father shared custody of patient; Early childhood was described as stable; Patient's mother was diagnosed with cancer during his adolescence; He was homeless during his senior year of high school; He went to live with other family after that time; He described his teenage years as tumultuous    Language(s) Spoken: English  Current Living Situation: Patient lives with wife and daughter Daphne Odom); Visits with his daughters from a previous relationship a few times per month (ages 12 and 25)  Relationship Status:  (11 years)  Children: 3 children  Educational:  Highest level of education: Patient finished 11th grade; He reported that he started training to be a  in high school; He reported that he has also completed some additional college courses in quantum physics until the start of the pandemic  School performance: Good performance;  Patient received average or better grades  Learning disability: No  Special education classes: No  Academic retention: No - Patient reported that he skipped 3rd grade due to exceptional academic achievement  Attention problems/ADHD: No  Behavior problems: No  Vocational:  Current occupation: Not working since 2016  Length of current employment: N/A  Work problems: N/A  Previous vocational history: Patient most recently worked as a  and   Applications for disability: Yes - Patient was denied and tried to appeal without any clear results   Service: No    Medical:     Patient Active Problem List   Diagnosis    Essential hypertension    Hemispheric carotid artery syndrome    Insomnia    Nonbacterial thrombotic endocarditis    S/P mitral valve repair    Focal epilepsy ([facility] )    Type 2 diabetes mellitus with diabetic peripheral angiopathy without gangrene, without long-term current use of insulin ([facility] )    History of CVA (cerebrovascular accident)    Obesity (BMI 30-39  9)    Eczema    Psoriasis    Dizziness and giddiness    Nonintractable epilepsy without status epilepticus (HCC)        Past Medical History:   Diagnosis Date    Diabetes mellitus ([facility] )     Hypertension     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, but path consistent with thrombotic fibrotic mass, marantic endocarditis    Seizure ([place] [address] )     possible, unclear history    TIA (transient ischemic attack)     Possible     Mood/Psychiatric:  Problem history: None  Treatment history: None  Psychiatric hospitalizations: None  Abuse history: None  History of self-harm / violence: None  Substance Use:     Social History     Tobacco Use    Smoking status: Former Smoker     Packs/day: 1 00     Years: 17 00     Pack years: 17 00     Types: Cigarettes     Start date: 1996     Quit date: 2016     Years since quittin 3    Smokeless tobacco: Never Used   Substance Use Topics    Alcohol use: Not Currently     Frequency: Patient refused     Drinks per session: Patient refused     Binge frequency: Never     Comment: rarely Family History:     Family History   Problem Relation Age of Onset    Heart attack Mother     Hypertension Mother     Eczema Mother     Uterine cancer Maternal Grandmother     Hypertension Maternal Grandmother     Diabetes Maternal Grandmother     Diabetes type II Paternal Grandmother     Hypertension Paternal Grandmother     Cancer Paternal Grandfather     Breast cancer Maternal Aunt     Diabetes Maternal Aunt     Eczema Sister     Hypertension Sister     Eczema Brother     Eczema Son     Eczema Daughter     Eczema Sister     Seizures Neg Hx       Other Family History:   · No known family history of neurological problems  · No known family history of psychiatric problems    Behavioral Observations/Mental Status:   Arousal: Awake; Alert; Oriented x3  Prosthetic Devices: Patient wore glasses during the evaluation; No hearing aids; No ambulatory assistive devices  Appearance: Appeared stated age; Of average stature; Clothing was casual and appropriate to the setting and weather conditions  Grooming/Hygiene: Adequately groomed and hygienic  Posture/Gait: Grossly WNL  Motor: No abnormalities noted  Social Relatedness: Patient was pleasant and cooperative throughout the evaluation process; Eye contact and facial expressiveness was WNL  Mood: Reported to be ok  Affect: Euthymic  Thought: Linear; Logical; Goal-directed; Patient reported some experience of visual anomalies during the present evaluation including patterns in the grass outside the windows of the office  Speech: Volume, clarity, rate, tone, and prosody were WNL  Insight: Adequate    Plan: Mr Juan Lakhani presented for comprehensive neuropsychological evaluation  Clinical interview was completed by the attending neuropsychologist  Test administration and scoring was conducted by a trained psychometrician under supervision and direct instruction of the attending provider   Tests are to be interpreted by the attending neuropsychologist with consideration given to the clinical history as described above  A detailed report of findings and impressions will follow  Kane Meade PsyD  Neuropsychologist  [place] Licensed Psychologist  Lic  #[id number]

## 2021-04-30 NOTE — PROGRESS NOTES
Aaliyah Coronado was seen by Michelle Adorno, neuropsychometrist, for neuropsychological assessment on 04/30/21

## 2021-05-07 NOTE — PROGRESS NOTES
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, who is returning to Neurology office for follow up of his seizures  Assessment/Plan:    Nonintractable epilepsy without status epilepticus ([address] )  He has had two additional events which are concerning for focal seizures  He does appear to likely have some mild alteration of awareness with these and they have progressed to generalized tonic clonic seizures in the past      I discussed that at this point, he does meet criteria for medically refractory epilepsy, and he would benefit from a presurgical evaluation  He did recently have neuropsychologic testing, with the results of this pending  He has been planned to be admitted to the epilepsy monitoring unit, but has not been able to get this scheduled because of schedule conflicts at home  He is planning to be admitted to the epilepsy monitoring unit in June  I stressed the importance of arranging this EMU admission so that we can better capture and characterize his events and look forward to the possibility of epilepsy surgery  -- he preferred to keep his medicines unchanged for now  We did discuss the possibility of increasing his oxcarbazepine a little bit further, but he will work to get admitted to the epilepsy monitoring unit  --pending the results of his epilepsy monitoring unit evaluation, he likely would benefit from getting a 3 verito MRI and a PET scan of his brain    --Following his admission, he likely would benefit from medication adjustments if seizures are confirmed, possibly with trying to change oxcarbazepine to a different medication such as zonisamide  He will Return in about 3 months (around 8/12/2021)  Subjective:    HPI  Current seizure medications:  1  Oxcarbazepine 900 mg in the morning and 1200 mg at night  2  Levetiracetam 1500 mg twice a day  Other medications as per Epic  Since his last visit, he did have 2 seizures which were consistent with his typical seizures in March  These did not progress to a larger seizure, but he had an intense odd sensation that is hard to describe, "wonderful" sensation, but very similar to how his seizures would always begin  He has still been experiencing some dizziness occasionally, but doesn't feel that it is more problematic since his last increase in Oxcarbazepine  He did get Neuropsychologic testing about 2 weeks ago, but results are still pending  He has been noting some odd sensations like he has been seeing images or people, like he is seeing spirits standing next to people  He feels like this is more like a premonition and sometimes knows true things about people  He has been experiencing this for a long time, but has been hesitant to mention this  Prior Seizure Medications: Phenytoin, Lamotrigine    His history was also obtained from his wife, who was present at today's visit  Objective:    Blood pressure (!) 164/108, pulse 82, height 5' 11" (1 803 m), weight 109 kg (240 lb)  Physical Exam    Neurological Exam      ROS:    Review of Systems   Constitutional: Negative  Negative for appetite change and fever  HENT: Negative  Negative for hearing loss, tinnitus, trouble swallowing and voice change  Eyes: Negative  Negative for photophobia and pain  Respiratory: Negative  Negative for shortness of breath  Cardiovascular: Negative  Negative for palpitations  Gastrointestinal: Negative  Negative for nausea and vomiting  Endocrine: Negative  Negative for cold intolerance  Genitourinary: Negative  Negative for dysuria, frequency and urgency  Musculoskeletal: Positive for neck pain (right side back of neck, 2-3x a week)  Negative for myalgias  Skin: Negative  Negative for rash     Neurological: Positive for seizures (March 22, Ora's lasting from March 23-24 total of 2 days  )  Negative for dizziness, tremors, syncope, facial asymmetry, speech difficulty, weakness, light-headedness, numbness and headaches  Hematological: Negative  Does not bruise/bleed easily  Psychiatric/Behavioral: Negative  Negative for confusion, hallucinations and sleep disturbance  All other systems reviewed and are negative        I personally reviewed the ROS that was entered by the medical assistant

## 2021-05-10 ENCOUNTER — TELEPHONE (OUTPATIENT)
Dept: NEUROLOGY | Facility: CLINIC | Age: 46
End: 2021-05-10

## 2021-05-10 NOTE — TELEPHONE ENCOUNTER
Patient returning call to office  (no messages or encounters found)  Patient has upcoming appt with Katt Massey in [facility] office on 5/12  Confirmed with patient  If anything further is needed, please call patient back

## 2021-05-12 ENCOUNTER — OFFICE VISIT (OUTPATIENT)
Dept: NEUROLOGY | Facility: CLINIC | Age: 46
End: 2021-05-12
Payer: COMMERCIAL

## 2021-05-12 VITALS
DIASTOLIC BLOOD PRESSURE: 108 MMHG | HEART RATE: 82 BPM | SYSTOLIC BLOOD PRESSURE: 164 MMHG | WEIGHT: 240 LBS | HEIGHT: 71 IN | BODY MASS INDEX: 33.6 KG/M2

## 2021-05-12 DIAGNOSIS — G40.909 NONINTRACTABLE EPILEPSY WITHOUT STATUS EPILEPTICUS (HCC): Primary | ICD-10-CM

## 2021-05-12 PROCEDURE — 3008F BODY MASS INDEX DOCD: CPT | Performed by: PSYCHIATRY & NEUROLOGY

## 2021-05-12 PROCEDURE — 3077F SYST BP >= 140 MM HG: CPT | Performed by: PSYCHIATRY & NEUROLOGY

## 2021-05-12 PROCEDURE — 99215 OFFICE O/P EST HI 40 MIN: CPT | Performed by: PSYCHIATRY & NEUROLOGY

## 2021-05-12 PROCEDURE — 1036F TOBACCO NON-USER: CPT | Performed by: PSYCHIATRY & NEUROLOGY

## 2021-05-12 PROCEDURE — 3080F DIAST BP >= 90 MM HG: CPT | Performed by: PSYCHIATRY & NEUROLOGY

## 2021-05-12 NOTE — PATIENT INSTRUCTIONS
-- It will be important to arrange admission to the Epilepsy Monitoring Unit    -- Continue your seizure medications unchanged

## 2021-05-12 NOTE — ASSESSMENT & PLAN NOTE
He has had two additional events which are concerning for focal seizures  He does appear to likely have some mild alteration of awareness with these and they have progressed to generalized tonic clonic seizures in the past      I discussed that at this point, he does meet criteria for medically refractory epilepsy, and he would benefit from a presurgical evaluation  He did recently have neuropsychologic testing, with the results of this pending  He has been planned to be admitted to the epilepsy monitoring unit, but has not been able to get this scheduled because of schedule conflicts at home  He is planning to be admitted to the epilepsy monitoring unit in June  I stressed the importance of arranging this EMU admission so that we can better capture and characterize his events and look forward to the possibility of epilepsy surgery  -- he preferred to keep his medicines unchanged for now  We did discuss the possibility of increasing his oxcarbazepine a little bit further, but he will work to get admitted to the epilepsy monitoring unit  --pending the results of his epilepsy monitoring unit evaluation, he likely would benefit from getting a 3 verito MRI and a PET scan of his brain    --Following his admission, he likely would benefit from medication adjustments if seizures are confirmed, possibly with trying to change oxcarbazepine to a different medication such as zonisamide

## 2021-05-22 DIAGNOSIS — I10 ESSENTIAL HYPERTENSION: ICD-10-CM

## 2021-05-22 PROCEDURE — 4010F ACE/ARB THERAPY RXD/TAKEN: CPT | Performed by: PSYCHIATRY & NEUROLOGY

## 2021-05-22 RX ORDER — LISINOPRIL 40 MG/1
TABLET ORAL
Qty: 90 TABLET | Refills: 1 | Status: SHIPPED | OUTPATIENT
Start: 2021-05-22 | End: 2021-11-18

## 2021-05-22 RX ORDER — AMLODIPINE BESYLATE 5 MG/1
TABLET ORAL
Qty: 90 TABLET | Refills: 1 | Status: SHIPPED | OUTPATIENT
Start: 2021-05-22 | End: 2021-11-20

## 2021-06-01 ENCOUNTER — TELEPHONE (OUTPATIENT)
Dept: NEUROLOGY | Facility: CLINIC | Age: 46
End: 2021-06-01

## 2021-06-01 ENCOUNTER — OFFICE VISIT (OUTPATIENT)
Dept: NEUROLOGY | Facility: CLINIC | Age: 46
End: 2021-06-01
Payer: COMMERCIAL

## 2021-06-01 DIAGNOSIS — G40.909 NONINTRACTABLE EPILEPSY WITHOUT STATUS EPILEPTICUS, UNSPECIFIED EPILEPSY TYPE (HCC): Primary | ICD-10-CM

## 2021-06-01 DIAGNOSIS — G40.909 NONINTRACTABLE EPILEPSY WITHOUT STATUS EPILEPTICUS (HCC): Primary | ICD-10-CM

## 2021-06-01 PROCEDURE — 96138 PSYCL/NRPSYC TECH 1ST: CPT | Performed by: CLINICAL NEUROPSYCHOLOGIST

## 2021-06-01 PROCEDURE — 96139 PSYCL/NRPSYC TST TECH EA: CPT | Performed by: CLINICAL NEUROPSYCHOLOGIST

## 2021-06-01 PROCEDURE — 96121 NUBHVL XM PHY/QHP EA ADDL HR: CPT | Performed by: CLINICAL NEUROPSYCHOLOGIST

## 2021-06-01 PROCEDURE — 96116 NUBHVL XM PHYS/QHP 1ST HR: CPT | Performed by: CLINICAL NEUROPSYCHOLOGIST

## 2021-06-01 PROCEDURE — 96132 NRPSYC TST EVAL PHYS/QHP 1ST: CPT | Performed by: CLINICAL NEUROPSYCHOLOGIST

## 2021-06-01 NOTE — TELEPHONE ENCOUNTER
----- Message from [facility], RN sent at 6/1/2021  1:48 PM EDT -----  Regarding: FW: EMU admission  Patient was to be admitted to the EMU sometime in June  He was waiting for school to end  Please reach back out to him and see if he can schedule his admission now that school should be coming to an end   ----- Message -----  From: Gerald Rush MD  Sent: 2/26/2021  11:24 AM EDT  To: [facility], RN, #  Subject: EMU admission                                    Hello,    I would like to admit Blakelykerry Azul to the EMU for a phase I presurgical evaluation  Please add him to the epilepsy surgery list      He does need a routine EEG prior to admission, which I am ordering today       Thanks,    Gerald Rush MD

## 2021-06-02 NOTE — TELEPHONE ENCOUNTER
Patient returning call  Scheduled for EMU admission on 6/30 at 8 AM  Reviewed EMU information packet  Packet placed in Mail  [id number] entered  EMU Checklist started      Patient added to epilepsy surgery list

## 2021-06-09 DIAGNOSIS — E11.51 TYPE 2 DIABETES MELLITUS WITH DIABETIC PERIPHERAL ANGIOPATHY WITHOUT GANGRENE, WITHOUT LONG-TERM CURRENT USE OF INSULIN (HCC): ICD-10-CM

## 2021-06-09 RX ORDER — ATORVASTATIN CALCIUM 20 MG/1
TABLET, FILM COATED ORAL
Qty: 90 TABLET | Refills: 0 | Status: SHIPPED | OUTPATIENT
Start: 2021-06-09 | End: 2021-08-26

## 2021-06-09 NOTE — TELEPHONE ENCOUNTER
EMU has been approved  Called [facility] [phone] spoke with Errol Sung  Who gave me the authorization number and date

## 2021-06-16 NOTE — PROGRESS NOTES
Neuropsychological Evaluation  [facility]  [address]  [place] [address]  P: [phone] F: [phone]    Feedback from Neuropsychological Evaluation    Mr Sanford Roman returned for a feedback appointment to review the findings and recommendations from a neuropsychological evaluation conducted on 4/30/2021  The patient was accompanied by his wife and daughter who participated in the feedback appointment with patient permission  Findings from the evaluation, including indications of cognitive dysfunction in several skill areas with limited findings to assist in the process of lateralizing/localizing seizure focus, were discussed and the patient expressed understanding  Recommendations were reviewed (see evaluation report from 4/30/2021 for full details)  The patient was given the opportunity to ask questions and expressed satisfaction with answers provided  Contact information for this provider was given to the patient and he was encouraged to contact the office with any further questions or concerns  The neuropsychological evaluation report was routed to the referring provider, Dr Colin Waller, for review and follow-up as needed  At the patient's request, a copy of the evaluation report will also be sent to the patient at his home address  Fanny Sandoval PsyD  Neuropsychologist  [place] Licensed Psychologist  Lic  #[id number]     Tasks Conducted Total Time Spent Associated CPT Codes   Clinical Interview 101 min  61994 x 1 unit  96121 x 5 units   Report Writing 247 min  Neuropsychological Test Administration (PhD/PsyD) 0 min  16450 x 0 unit  96137 x 0 units   Scoring (PhD/PsyD) 0 min  Neuropsychological Test Administration [facility] ) 260 min  76852 x 1 unit  96139 x 8 units   Scoring (Tech ) 55 min  Chart Review 32 min  38224 x 1 unit  96133 x 0 units   Interpretation of Test Data 29 min  Feedback to Patient/Family Members 26 min

## 2021-06-16 NOTE — PROGRESS NOTES
Neuropsychological Evaluation  [facility]  [address], [address], [address]  P: [phone] F: [phone]     Patient Name: Andre Magaña  Age: 39 y o  MRN: [id number]  : 1975 DOS: 2021    Results/Interpretation    Sources of Information:   [employer] (ACS): Test of Premorbid Functioning (TOPF)  Animal Naming Fluency  Auditory Naming Test  Gonzalez Anxiety Inventory (RUDY)  Gonzalez Depression Inventory-2 (BDI-2)  Brief Visuospatial Memory Test - Revised (BVMT-R) - Form 1  [place] Verbal Learning Test - 3 (CVLT-3)  Clinical Interview (see interview note below for history and presenting problems)  Controlled Oral Word Association Test (COWAT)  Finger Tapping Test  [place] Multiphasic Personality Inventory - 2 - Restructured Form (MMPI-2-RF)  Neuropsychological Assessment Battery (NAB) - Form 1   Selected Subtests: Naming  Quality of Life in Epilepsy- 31 Item (QoLIE-31)  Repeatable Battery for the Assessment of Neuropsychological Status (R-BANS) - Form A  Selected Subtests: Line Orientation  Mario Complex Figure Test and Recognition Trial (RCFT)  Stroop Color and Word Test (SCWT)  TOMM  Trail Making Test (TMT)  Wechsler Adult Intelligence Scale, Fourth Edition (WAIS-IV)  Selected Subtests: Block Design, Similarities, Digit Span, Matrix Reasoning, Vocabulary, Arithmetic, Symbol Search, Coding  Wechsler Memory Scale, Fourth Edition (WMS-IV):   Selected Subtests: Logical Memory I/II/Recognition, Verbal Paired Associates I/II/Recognition, Visual Reproduction I/II/Recognition/Copy  [employer] -64 Card Version (WCST-64)    Evaluation Findings:  Findings, as documented below, are presented with qualitative descriptors (adapted from the Wechsler system unless otherwise specified) based on age-appropriate normative data    For ease of readability, findings are categorized by cognitive domain with brief descriptions of the abilities targeted by tasks administered  Engagement/Symptom Validity:   Hearing/Seeing stimuli: No problems reported; Patient wore glasses throughout testing  Approach to testing: Pleasant and cooperative; Rapport was able to be established for the purposes of testing; At the outset of testing, the patient was alert and seemed slightly anxious about testing; Anxiety was perceived as easing over time but diminishing interest/engagement was also noted; The patient grasped most simple task instructions but seemed perplexed by instructions for more demanding tasks at times and benefitted from repetition; The patient sometimes seemed to stop prematurely on tasks but was receptive to redirection to continue; He was generally confident in/untroubled by his performance on testing; Affect was noted to be somewhat blunted during testing; Overall testing pace was sluggish    Fatigue and other factors: Patient was noted to demonstrate increasing fatigue as testing went on; The patient was offered breaks throughout testing but declined, expressing preference to get it done with  Formal assessment of performance validity: There were two marginal indicators on formal measures of performance validity; All others were within acceptable limits  Interpretation: Findings are thought to be grossly reliable and valid; Some consideration should be given to the possibility that fatigue could have negatively impacted individual test performances    Premorbid/General Intellectual Functioning:   Estimate per demographic factors: Low average  Estimate per word reading test: Borderline (5 5%ile)  General intellectual ability: Borderline to low average (FSIQ = 76; GAI = 82); Discrepancy between primary verbal comprehension (VCI) and perceptual reasoning (UHMERA) indices was not found to be statistically or clinically significant (Base Rate = 36 3%) and the FSIQ is thought to be a reasonable estimate of overall intellectual functioning;  There was a statistically significant index level discrepancy noted between the VCI and working memory index (WMI) that may potentially be of clinical significance (Base Rate = 10 8%); Index level comparisons were otherwise not noted to be of particular clinical significance; Index scores are as follows: VCI = 87 (19%ile); HUMERA = 82 (12%ile), WMI = 71 (3%ile), and processing speed index (PSI) = 76 (5%ile)  Interpretation: Broadly borderline to low average range functioning based upon demographic factors and performance-based measures used to estimate premorbid functioning; Findings are thought possibly inconsistent with patients self-report of developmental, academic, and vocational history; however, while potentially related, these factors cannot be equated with intellectual functioning overall   *The presence of some lower-than-expected scores on neuropsychological measures does not necessarily suggest decline from baseline cognitive functioning  *    Attention/Working Memory:   Simple auditory attention: Low average (16%ile) on a simple digit repetition task  Auditory attention/working memory: Low average (9%ile) on tasks requiring mental manipulation of digit strings; Borderline (5%ile) on a measure of mental arithmetic     Information Processing Speed:  Speeded visual scanning/sequencing: Extremely low (<1%ile) on a trail making test  Timed number-symbol matching: Borderline (2%ile)  Speeded symbol matching: Low average (16%ile)  Timed word reading: Extremely low (<1%ile)  Timed color identification: Extremely low (<1%ile)    Language:   Confrontation picture naming: Below average (24%ile; per NAB interpretive guidelines)  Auditory naming: Low average (21%ile)  Word knowledge/Verbal concept formation: Low average (16%ile) on a vocabulary task  Verbal reasoning: Average (25%ile) on a verbal concept formation task  Semantic fluency: Low average (10%ile) on category-based word fluency;  Response set is notable for 1 repetition error and no rule violation/intrusion errors   Phonemic fluency: Extremely low (<1%ile) on phoneme-based word fluency; Response set is notable for limited number of responses and two rule violation errors (e g , provided proper noun responses); No intrusion or repetition errors    Visual Perception:   Visual perception and spatial orientation: Average (26-50%ile); Raw score of 16/20 on a line orientation task  Visual analysis and synthesis: Borderline (5%ile) performance on task incorporating manual manipulation of multi-colored blocks to match presented images  Visual organization/classification: Average (37%ile) on a matrix reasoning task  Perceptuo-motor abilities:   Moderate to complex figures: Average (Raw = 42/43; Base rate = 26-50%); The patients copy of a series of complex line drawings was notable for good appreciation for the gestalt of the figures with some inaccurately reproduced details (thought likely due to careless execution rather than misperception)  Complex figure: Extremely low (Raw = 22 5/36; <1%ile); His copy of a complex line drawing was notable for misrepresentation of the overall gestalt of the figure with notable distortions and omissions of fine details of the figure; Poor score is thought due to possible misperception and/or poor visual planning; Execution is possibly somewhat careless, but this does not account for poor performance in entirety    Motor Functioning:  Gross motor speed: Low average for dominant (R) and non-dominant hand; No indications of lateralization     Memory and Learning:   Verbal:  List learning: Borderline to average range across learning trials; There were indications of benefit from repeated exposure to stimuli but learning was limited overall; Borderline (trial 5), low average (trials 2, 3, and 4) and average (trial 1) performances were noted;  At best performance, the patient recalled 7/16 words from the list (trials 4 and 5/5)  List recall: Short delay - Free recall: Extremely low (2/16 words recalled)  List recall: Short delay - Cued recall: Borderline; Score was modestly improved with semantic cueing (4/16 words recalled)   List recall: Long delay - Free recall: Extremely low (2/16 words recalled)  List recall: Long delay - Cued recall: Borderline; Score was modestly improved with semantic cueing (4/16 words recalled)  List recognition: Borderline range discriminability between target and non-target words; 12/16 recognition hits; 10 false positive errors; There were indications of positive response bias overall  Story learning: Borderline (2%ile) immediate recall for a set of two short stories  Story recall: Borderline (2%ile) delayed recall for the stories; When considered relative to his borderline immediate recall performance, the patients score suggests intact retention for information learned (contrast ss = 10)  Story recognition: Borderline; The patients delayed free recall for information from the story is consistent with expectations given his borderline range performance on a recognition task (contrast ss = 9)  Word-pair learning: Borderline (2%ile) immediate recall for a set of semantically-related and unrelated words  Word-pair recall: Borderline (2%ile); When considered relative to his borderline immediate recall performance, the patients score suggests intact retention for information learned (contrast ss = 9)  Word-pair recognition: Low average (base rate = 10-16%) performance on a yes/no recognition task for previously learned word pairs; The patients delayed free recall for word-pairs is weaker than expected given his low average range performance on a recognition task (contrast ss = 6)    Visual:  Simple shape learning: Extremely low (<1%ile) overall; There is inconsistent indication of learning across trials  Simple shape recall: Extremely low (<1%ile);  Patient retained 75% from his best learning trial  Simple shape recognition: Extremely low (<1%ile) discriminability between target and non-target shapes; 4/6 recognition hits; 1 false positive error  Moderate to complex shape learning: Low average (9%ile) immediate recall for a series of increasingly complex line figures  Moderate to complex shape recall: Borderline (2%ile) delayed recall for a series of increasingly complex line figures when compared to the normative sample; When considered relative to his low average immediate recall performance, the patients score suggests some problems with information retention (contrast ss = 6)  Moderate to complex shape recognition: Low average (base rate = 17-25%ile); The patients delayed free recall for the figures is lower than expected given his low average range performance on a recognition task (contrast ss = 5)  Complex figure: Short delay: Extremely low (<1%ile)  Complex figure: Long delay: Extremely low (<1%ile)   Complex figure recognition: Average; 12/12 recognition hits; 3 false positive errors    Executive Functions:    Visual organization/classification: Average (37%ile) on a matrix reasoning task  Verbal reasoning: Average (25%ile) on a verbal concept formation task  Visual-motor sequencing/set-shifting: Trail making task requiring shifting between numbers and letters was discontinued at the time limit (300); By the time the test was discontinued, there were 6 errors (both set-shifting and sequencing errors)  Phonemic fluency: Extremely low (<1%ile) on phoneme-based word fluency; Response set is notable for limited number of responses and 2 rule violation errors (e g , provided proper noun responses); No intrusion or repetition errors  Response inhibition/selective attention: Low average (12%ile); The patient made 0 errors on a Stroop task  Concept formation/problem solving: Low average (11-16%ile) number of categories completed on a card sorting task;  Total error score was within the borderline range with indications of more perseverative errors made than non-perseverative errors     Rating Scales:    BDI-2: Patient self-report ratings on a measure used to assess symptoms commonly associated with depression suggest a minimal degree of symptomology  RUDY: Patient self-report ratings on a measure used to assess symptoms commonly associated with anxiety suggest a moderate degree of symptomology  QOLIE-31: The patients self-report on an inventory used to assess quality of life in epilepsy suggests a lower quality of life when compared to other patients with epilepsy; Areas that received lowest ratings (e g , areas that most negatively impact the patients quality of life) include social function, seizure worry, and medication effects   MMPI-2-RF: The patient completed a comprehensive self-report inventory designed to measure emotional and behavioral symptomology  The patient's score profile was notable for indications of probable over-reporting of somatic symptoms as evidenced by the assertion of a considerably larger number of somatic symptoms rarely described by individuals with genuine medical problems  Scores on the somatic symptom scales will not be interpreted  There were also indications of possible under-reporting of emotional and behavioral symptomology  As such, lack of elevation on the substantive clinical scales may not accurately reflect any experience of genuine emotional distress  Findings are interpreted with a degree of caution  Findings on the substantive clinical scales suggest some degree of thought dysfunction as characterized by a large number of unusual thought and perceptual processes  When considered in context of the patients reported history, this scale elevation is thought to reflect his reported experience of varied and persistent visual anomalies/hallucinations that could be related to his seizure history   While there were no clinically relevant score elevations on an overarching emotional/internalizing scale, there were some indications that the patient experiences symptoms of social disengagement, anhedonia, and a lack of positive emotional experiences  Summary and Interpretation:  Mr Liam Rowland is a 39 y o , right-handed, male with 11 years of education who was referred for neuropsychological evaluation by his neurology provider, Cory Juarez MD, for assessment of cognitive functioning in the context of a personal history that includes focal epilepsy, possible prior stroke (recent MRI negative for ischemic injury), mitral valve thrombotic mass removal (2015), essential hypertension, DM 2 without long-term use of insulin, and psoriasis  Neuropsychological evaluation is being conducted as part of a comprehensive work-up in consideration of epilepsy surgery candidacy  At the time of the present evaluation, the patient reported an approximate 5-to-6-year history of cognitive dysfunction that the patient attributes to his seizure history  Cognitive symptoms are noted to be of variable severity from one day to the next and there is no clear indication of continuous, gradual decline based on the patients description of symptoms  The patient reported numerous and varied cognitive symptoms that include recent and remote memory loss, difficulties with attention/concentration, and expressive language problems  The patient and his family described potential executive dysfunction manifesting as both cognitive and behavioral symptoms that could be worsening over time  Specifically, the patient was noted to be perseverative on ideas/topics/tasks, inflexible in his routine, and incapable of shifting attention when necessary  He was noted to be meticulously organized and to have very limited tolerance for any changes in his organizational systems  The patients family members feel that cognitive symptoms may be exacerbated by stress, but the patient does not agree with this   Emotionally, the patient denied experiences of significantly low mood and acknowledged some degree of stress at times  Other symptoms of note include reluctance to leave home (agoraphobia), preoccupation with fears of contamination (patient worries places are dirty and showers more than 1x per day), and social disengagement that is atypical of the patients previous social tendencies  He additionally reported varied and persistent interictal visual hallucinations/anomalies manifesting as well-formed figures (often of a Gnosticist nature; e g , angels, people praying) as well as shapes and patterns that distort his perception of space around him  The patient remains independent for ADLs and most IADLs  The patient does not drive due to his seizure history and has not held a job since 2016 due to seizures  Neuropsychological testing was completed  The overall cognitive profile is thought to be a grossly valid estimate of the patients current cognitive functioning, although the possibility of performance validity factors negatively impacting the patients performance cannot be entirely ruled out  Premorbid estimates suggest borderline to low average range functioning based on demographic factors and performance on measures that are resilient to neurological insult/injury  Estimates of premorbid functioning are viewed as being somewhat inconsistent with the patients reported developmental, academic, and vocational history which complicates interpretation of intra-individual change  However, relative to the normative sample, performance was within a normal, typically expected range across neurocognitive skill areas that include simple auditory attention, aspects of auditory working memory (with the exception of poor performance on a measure of mental arithmetic), gross motor speed (no lateralizing findings), and most measures of expressive language   The patients performance on measures of visual perception was suggestive of intact spatial orientation but weaker than expected skills related to visual analysis/synthesis  Visuoconstruction was notable for inconsistencies between measures as the patient demonstrated intact copy for a series of moderately complex line drawings but notably poor copy for a complex line figure with indications of poor visual planning/organization  Executive functioning was similarly variable with normal range visual/verbal reasoning scores, slightly weak selective attention/inhibition and concept formation scores, and extremely low scores on measures of phonemic fluency  A measure of set-shifting and sequencing was not able to be completed within a time limit and the patient made several errors prior to discontinuation  Additionally, there were indications of a tendency to make perseverative errors on a sorting task  The patients scores on measures of information processing speed were variable with some extremely low performances  The patients profile of scores on measures of learning and memory were noted to be variable  In terms of verbal measures, the patient demonstrated some limited learning with premature plateau on measures that incorporated repeated learning trials  Scores related to memory were variable with several inconsistencies between measures  Story and word-pair memory was intact relative to the patients weak initial learning scores, while word-list memory was very weak with only modest benefit from semantic and recognition cueing  In terms of visual measures, the patient demonstrated very poor learning on a measure with repeated learning trials  His immediate recall on other visual memory tasks was inconsistent with one low average score and one extremely low score  He demonstrated consistent difficulties with information retention on visual memory tasks with inconsistent benefit from recognition cueing   Emotionally, the patient endorsed a moderate degree of symptoms commonly associated with anxiety on a simple self-report measure  His response set on a more comprehensive measure of emotional and behavioral functioning was notable for possible underreporting of psychological symptoms; however, he also reported symptoms associated with thought dysfunction that likely reflect his experience of perceptual anomalies  The patients self-report on a measure of quality of life in epilepsy was suggestive of lower-than-average quality of life compared to a sample of other epilepsy patients  Mr Fco Juares current neuropsychological profile is notable for indications of cognitive dysfunction across several cognitive skill areas that include visual perception/construction, learning/memory, complex attention, and executive functioning  Aside from his performance on measures designed to assess aspects of executive functioning, there were additional findings that would support impressions of executive dysfunction that include poor visual planning/organization on a visuoconstruction task and encoding deficits on learning tasks that can occur with executive control problems  Cognitive test results are viewed as being of limited lateralizing/localizing value with regard to potential seizure focus, and this is further complicated by the unclear nature of the patients cerebrovascular history (possible history of occipital stroke?, small frontal and parietal infarcts evidenced around the time of reported cognitive symptom onset)  While etiology is unclear, reported behavioral/psychiatric symptoms could reasonably suggest temporal lobe (particularly R) and/or frontal lobe pathology as these areas have been implicated in the development of hallucinations and obsessive-compulsive features in the context of neurological insult/injury and this would not be inconsistent with cognitive test results   Other considerations with regard to cognitive deficits evidenced on testing should be given to the impact of possible developmental factors given estimates of premorbid functioning in the borderline to low average range (though patient did not report history of LD, ID, ADHD, etc)  Additionally, medication effects, insufficient sleep quantity/quality, and questions of possible performance validity factors may also have contributed to the patients current neurocognitive profile  Recommendations:  1  Neurocognitive test findings from the present evaluation are of limited utility in the process of determining lateralization/localization of potential seizure focus  Further neurological/neuroradiological evaluation will likely be of higher yield for the purposes of localizing seizure focus  2  The patient endorsed a lower-than-average quality of life related to his epilepsy diagnosis  However, he reported additional hopes to continue management of seizures with medication alone and to not have to pursue epilepsy surgery  As such, he is encouraged to continue discussing treatment options and their associated risks/benefits with his neurology providers  He was encouraged to be open and honest in his discussion of his symptoms and fears about specific interventions with his neurology provider(s)  3  The patient reported having a limited support system and stated that he relies almost solely on his wife for emotional and physical assistance when needed  The patient is strongly encouraged to consider other, additional resources for support in case he should need assistance after surgery  He is encouraged to discuss risks of surgery with his family members/other social supports to develop plans for how he might most benefit from their ongoing involvement in his care  4  Continuation of neurological care is recommended to continue monitoring any changes in Mr Savannah Hein cognitive and/or emotional/psychiatric symptoms       5  Should the patients neurology providers desire repeat neuropsychological and for comparison purposes, re-evaluation can be conducted no sooner than one year from the time of the present evaluation (April 2022)     Josi Espitia PsyD  Neuropsychologist  [place] Licensed Psychologist  Lic  #[id number]

## 2021-06-17 ENCOUNTER — DOCUMENTATION (OUTPATIENT)
Dept: NEUROLOGY | Facility: CLINIC | Age: 46
End: 2021-06-17

## 2021-06-19 DIAGNOSIS — E11.51 TYPE 2 DIABETES MELLITUS WITH DIABETIC PERIPHERAL ANGIOPATHY WITHOUT GANGRENE, WITHOUT LONG-TERM CURRENT USE OF INSULIN (HCC): Chronic | ICD-10-CM

## 2021-06-19 DIAGNOSIS — G47.00 INSOMNIA, UNSPECIFIED TYPE: ICD-10-CM

## 2021-06-19 RX ORDER — TRAZODONE HYDROCHLORIDE 50 MG/1
50-100 TABLET ORAL
Qty: 60 TABLET | Refills: 1 | Status: ON HOLD | OUTPATIENT
Start: 2021-06-19 | End: 2021-06-30

## 2021-06-19 RX ORDER — SITAGLIPTIN 100 MG/1
TABLET, FILM COATED ORAL
Qty: 30 TABLET | Refills: 1 | Status: SHIPPED | OUTPATIENT
Start: 2021-06-19 | End: 2021-08-21

## 2021-06-28 NOTE — TELEPHONE ENCOUNTER
Called and discussed EMU with patient  Confirmed that he will be admitted on 6/30  No further questions

## 2021-06-30 ENCOUNTER — HOSPITAL ENCOUNTER (INPATIENT)
Facility: HOSPITAL | Age: 46
LOS: 1 days | Discharge: LEFT BEFORE DISCHARGE | DRG: 053 | End: 2021-06-30
Attending: PSYCHIATRY & NEUROLOGY | Admitting: PSYCHIATRY & NEUROLOGY
Payer: COMMERCIAL

## 2021-06-30 ENCOUNTER — APPOINTMENT (INPATIENT)
Dept: NEUROLOGY | Facility: CLINIC | Age: 46
DRG: 053 | End: 2021-06-30
Payer: COMMERCIAL

## 2021-06-30 DIAGNOSIS — R44.1 EPISODES OF FORMED VISUAL HALLUCINATIONS: Primary | ICD-10-CM

## 2021-06-30 PROBLEM — R42 DIZZINESS AND GIDDINESS: Status: RESOLVED | Noted: 2019-09-05 | Resolved: 2021-06-30

## 2021-06-30 PROBLEM — L30.9 ECZEMA: Status: RESOLVED | Noted: 2019-01-22 | Resolved: 2021-06-30

## 2021-06-30 PROBLEM — I38: Status: RESOLVED | Noted: 2018-07-26 | Resolved: 2021-06-30

## 2021-06-30 LAB
ALBUMIN SERPL BCP-MCNC: 3.8 G/DL (ref 3.5–5)
ALP SERPL-CCNC: 68 U/L (ref 46–116)
ALT SERPL W P-5'-P-CCNC: 36 U/L (ref 12–78)
ANION GAP SERPL CALCULATED.3IONS-SCNC: 5 MMOL/L (ref 4–13)
AST SERPL W P-5'-P-CCNC: 17 U/L (ref 5–45)
ATRIAL RATE: 64 BPM
BASOPHILS # BLD AUTO: 0.03 THOUSANDS/ΜL (ref 0–0.1)
BASOPHILS NFR BLD AUTO: 0 % (ref 0–1)
BILIRUB SERPL-MCNC: 0.45 MG/DL (ref 0.2–1)
BUN SERPL-MCNC: 12 MG/DL (ref 5–25)
CALCIUM SERPL-MCNC: 9 MG/DL (ref 8.3–10.1)
CHLORIDE SERPL-SCNC: 112 MMOL/L (ref 100–108)
CO2 SERPL-SCNC: 26 MMOL/L (ref 21–32)
CREAT SERPL-MCNC: 1 MG/DL (ref 0.6–1.3)
EOSINOPHIL # BLD AUTO: 0.22 THOUSAND/ΜL (ref 0–0.61)
EOSINOPHIL NFR BLD AUTO: 2 % (ref 0–6)
ERYTHROCYTE [DISTWIDTH] IN BLOOD BY AUTOMATED COUNT: 13 % (ref 11.6–15.1)
GFR SERPL CREATININE-BSD FRML MDRD: 106 ML/MIN/1.73SQ M
GLUCOSE SERPL-MCNC: 140 MG/DL (ref 65–140)
GLUCOSE SERPL-MCNC: 91 MG/DL (ref 65–140)
HCT VFR BLD AUTO: 47.6 % (ref 36.5–49.3)
HGB BLD-MCNC: 15.2 G/DL (ref 12–17)
IMM GRANULOCYTES # BLD AUTO: 0.02 THOUSAND/UL (ref 0–0.2)
IMM GRANULOCYTES NFR BLD AUTO: 0 % (ref 0–2)
LYMPHOCYTES # BLD AUTO: 3.13 THOUSANDS/ΜL (ref 0.6–4.47)
LYMPHOCYTES NFR BLD AUTO: 34 % (ref 14–44)
MCH RBC QN AUTO: 28 PG (ref 26.8–34.3)
MCHC RBC AUTO-ENTMCNC: 31.9 G/DL (ref 31.4–37.4)
MCV RBC AUTO: 88 FL (ref 82–98)
MONOCYTES # BLD AUTO: 0.54 THOUSAND/ΜL (ref 0.17–1.22)
MONOCYTES NFR BLD AUTO: 6 % (ref 4–12)
NEUTROPHILS # BLD AUTO: 5.41 THOUSANDS/ΜL (ref 1.85–7.62)
NEUTS SEG NFR BLD AUTO: 58 % (ref 43–75)
NRBC BLD AUTO-RTO: 0 /100 WBCS
P AXIS: 13 DEGREES
PLATELET # BLD AUTO: 118 THOUSANDS/UL (ref 149–390)
PLATELET # BLD AUTO: 121 THOUSANDS/UL (ref 149–390)
PMV BLD AUTO: 11.9 FL (ref 8.9–12.7)
PMV BLD AUTO: 12.7 FL (ref 8.9–12.7)
POTASSIUM SERPL-SCNC: 3.3 MMOL/L (ref 3.5–5.3)
PR INTERVAL: 142 MS
PROT SERPL-MCNC: 7.1 G/DL (ref 6.4–8.2)
QRS AXIS: 59 DEGREES
QRSD INTERVAL: 94 MS
QT INTERVAL: 402 MS
QTC INTERVAL: 414 MS
RBC # BLD AUTO: 5.42 MILLION/UL (ref 3.88–5.62)
SODIUM SERPL-SCNC: 143 MMOL/L (ref 136–145)
T WAVE AXIS: 34 DEGREES
VENTRICULAR RATE: 64 BPM
WBC # BLD AUTO: 9.35 THOUSAND/UL (ref 4.31–10.16)

## 2021-06-30 PROCEDURE — 80053 COMPREHEN METABOLIC PANEL: CPT | Performed by: NURSE PRACTITIONER

## 2021-06-30 PROCEDURE — 99236 HOSP IP/OBS SAME DATE HI 85: CPT | Performed by: PSYCHIATRY & NEUROLOGY

## 2021-06-30 PROCEDURE — 95700 EEG CONT REC W/VID EEG TECH: CPT

## 2021-06-30 PROCEDURE — 82948 REAGENT STRIP/BLOOD GLUCOSE: CPT

## 2021-06-30 PROCEDURE — 85049 AUTOMATED PLATELET COUNT: CPT | Performed by: NURSE PRACTITIONER

## 2021-06-30 PROCEDURE — 95718 EEG PHYS/QHP 2-12 HR W/VEEG: CPT | Performed by: PSYCHIATRY & NEUROLOGY

## 2021-06-30 PROCEDURE — 93010 ELECTROCARDIOGRAM REPORT: CPT | Performed by: INTERNAL MEDICINE

## 2021-06-30 PROCEDURE — 93005 ELECTROCARDIOGRAM TRACING: CPT

## 2021-06-30 PROCEDURE — 80177 DRUG SCRN QUAN LEVETIRACETAM: CPT | Performed by: NURSE PRACTITIONER

## 2021-06-30 PROCEDURE — NC001 PR NO CHARGE: Performed by: PSYCHIATRY & NEUROLOGY

## 2021-06-30 PROCEDURE — 80183 DRUG SCRN QUANT OXCARBAZEPIN: CPT | Performed by: NURSE PRACTITIONER

## 2021-06-30 PROCEDURE — 95712 VEEG 2-12 HR INTMT MNTR: CPT

## 2021-06-30 PROCEDURE — 85025 COMPLETE CBC W/AUTO DIFF WBC: CPT | Performed by: NURSE PRACTITIONER

## 2021-06-30 RX ORDER — ONDANSETRON 2 MG/ML
4 INJECTION INTRAMUSCULAR; INTRAVENOUS EVERY 6 HOURS PRN
Status: DISCONTINUED | OUTPATIENT
Start: 2021-06-30 | End: 2021-06-30 | Stop reason: HOSPADM

## 2021-06-30 RX ORDER — LEVETIRACETAM 750 MG/1
750 TABLET ORAL EVERY 12 HOURS SCHEDULED
Status: DISCONTINUED | OUTPATIENT
Start: 2021-06-30 | End: 2021-06-30 | Stop reason: HOSPADM

## 2021-06-30 RX ORDER — AMLODIPINE BESYLATE 5 MG/1
5 TABLET ORAL DAILY
Status: DISCONTINUED | OUTPATIENT
Start: 2021-07-01 | End: 2021-06-30 | Stop reason: HOSPADM

## 2021-06-30 RX ORDER — HYDROCHLOROTHIAZIDE 12.5 MG/1
12.5 TABLET ORAL DAILY
Status: DISCONTINUED | OUTPATIENT
Start: 2021-07-01 | End: 2021-06-30 | Stop reason: HOSPADM

## 2021-06-30 RX ORDER — LISINOPRIL 20 MG/1
40 TABLET ORAL DAILY
Status: DISCONTINUED | OUTPATIENT
Start: 2021-07-01 | End: 2021-06-30 | Stop reason: HOSPADM

## 2021-06-30 RX ORDER — OXCARBAZEPINE 300 MG/1
900 TABLET, FILM COATED ORAL EVERY 12 HOURS SCHEDULED
Status: DISCONTINUED | OUTPATIENT
Start: 2021-06-30 | End: 2021-06-30

## 2021-06-30 RX ORDER — POLYETHYLENE GLYCOL 3350 17 G/17G
17 POWDER, FOR SOLUTION ORAL DAILY PRN
Status: DISCONTINUED | OUTPATIENT
Start: 2021-06-30 | End: 2021-06-30 | Stop reason: HOSPADM

## 2021-06-30 RX ORDER — DIPHENHYDRAMINE HCL 25 MG
25 TABLET ORAL EVERY 8 HOURS PRN
Status: DISCONTINUED | OUTPATIENT
Start: 2021-06-30 | End: 2021-06-30 | Stop reason: HOSPADM

## 2021-06-30 RX ORDER — LORAZEPAM 2 MG/ML
2 INJECTION INTRAMUSCULAR EVERY 8 HOURS PRN
Status: DISCONTINUED | OUTPATIENT
Start: 2021-06-30 | End: 2021-06-30 | Stop reason: HOSPADM

## 2021-06-30 RX ORDER — OXCARBAZEPINE 300 MG/1
600 TABLET, FILM COATED ORAL EVERY 12 HOURS SCHEDULED
Status: DISCONTINUED | OUTPATIENT
Start: 2021-06-30 | End: 2021-06-30 | Stop reason: HOSPADM

## 2021-06-30 RX ORDER — ACETAMINOPHEN 325 MG/1
650 TABLET ORAL EVERY 6 HOURS PRN
Status: DISCONTINUED | OUTPATIENT
Start: 2021-06-30 | End: 2021-06-30 | Stop reason: HOSPADM

## 2021-06-30 RX ORDER — ATORVASTATIN CALCIUM 20 MG/1
20 TABLET, FILM COATED ORAL DAILY
Status: DISCONTINUED | OUTPATIENT
Start: 2021-07-01 | End: 2021-06-30 | Stop reason: HOSPADM

## 2021-06-30 RX ADMIN — LEVETIRACETAM 750 MG: 750 TABLET ORAL at 16:54

## 2021-06-30 RX ADMIN — OXCARBAZEPINE 600 MG: 300 TABLET, FILM COATED ORAL at 16:54

## 2021-06-30 RX ADMIN — ENOXAPARIN SODIUM 40 MG: 40 INJECTION SUBCUTANEOUS at 12:34

## 2021-06-30 NOTE — PLAN OF CARE
Problem: Nutrition/Hydration-ADULT  Goal: Nutrient/Hydration intake appropriate for improving, restoring or maintaining nutritional needs  Description: Monitor and assess patient's nutrition/hydration status for malnutrition  Collaborate with interdisciplinary team and initiate plan and interventions as ordered  Monitor patient's weight and dietary intake as ordered or per policy  Utilize nutrition screening tool and intervene as necessary  Determine patient's food preferences and provide high-protein, high-caloric foods as appropriate       INTERVENTIONS:  - Monitor oral intake, urinary output, labs, and treatment plans  - Assess nutrition and hydration status and recommend course of action  - Evaluate amount of meals eaten  - Assist patient with eating if necessary   - Allow adequate time for meals  - Recommend/ encourage appropriate diets, oral nutritional supplements, and vitamin/mineral supplements  - Order, calculate, and assess calorie counts as needed  - Recommend, monitor, and adjust tube feedings and TPN/PPN based on assessed needs  - Assess need for intravenous fluids  - Provide specific nutrition/hydration education as appropriate  - Include patient/family/caregiver in decisions related to nutrition  Outcome: Progressing     Problem: Potential for Falls  Goal: Patient will remain free of falls  Description: INTERVENTIONS:  - Educate patient/family on patient safety including physical limitations  - Instruct patient to call for assistance with activity   - Consult OT/PT to assist with strengthening/mobility   - Keep Call bell within reach  - Keep bed low and locked with side rails adjusted as appropriate  - Keep care items and personal belongings within reach  - Initiate and maintain comfort rounds  - Make Fall Risk Sign visible to staff  - Apply yellow socks and bracelet for high fall risk patients  - Consider moving patient to room near nurses station  Outcome: Progressing     Problem: PAIN - ADULT  Goal: Verbalizes/displays adequate comfort level or baseline comfort level  Description: Interventions:  - Encourage patient to monitor pain and request assistance  - Assess pain using appropriate pain scale  - Administer analgesics based on type and severity of pain and evaluate response  - Implement non-pharmacological measures as appropriate and evaluate response  - Consider cultural and social influences on pain and pain management  - Notify physician/advanced practitioner if interventions unsuccessful or patient reports new pain  Outcome: Progressing     Problem: INFECTION - ADULT  Goal: Absence or prevention of progression during hospitalization  Description: INTERVENTIONS:  - Assess and monitor for signs and symptoms of infection  - Monitor lab/diagnostic results  - Monitor all insertion sites, i e  indwelling lines, tubes, and drains  - Monitor endotracheal if appropriate and nasal secretions for changes in amount and color  - [place] appropriate cooling/warming therapies per order  - Administer medications as ordered  - Instruct and encourage patient and family to use good hand hygiene technique  - Identify and instruct in appropriate isolation precautions for identified infection/condition  Outcome: Progressing  Goal: Absence of fever/infection during neutropenic period  Description: INTERVENTIONS:  - Monitor WBC    Outcome: Progressing     Problem: SAFETY ADULT  Goal: Patient will remain free of falls  Description: INTERVENTIONS:  - Educate patient/family on patient safety including physical limitations  - Instruct patient to call for assistance with activity   - Consult OT/PT to assist with strengthening/mobility   - Keep Call bell within reach  - Keep bed low and locked with side rails adjusted as appropriate  - Keep care items and personal belongings within reach  - Initiate and maintain comfort rounds  - Make Fall Risk Sign visible to staff  - Apply yellow socks and bracelet for high fall risk patients  - Consider moving patient to room near nurses station  Outcome: Progressing  Goal: Maintain or return to baseline ADL function  Description: INTERVENTIONS:  -  Assess patient's ability to carry out ADLs; assess patient's baseline for ADL function and identify physical deficits which impact ability to perform ADLs (bathing, care of mouth/teeth, toileting, grooming, dressing, etc )  - Assess/evaluate cause of self-care deficits   - Assess range of motion  - Assess patient's mobility; develop plan if impaired  - Assess patient's need for assistive devices and provide as appropriate  - Encourage maximum independence but intervene and supervise when necessary  - Involve family in performance of ADLs  - Assess for home care needs following discharge   - Consider OT consult to assist with ADL evaluation and planning for discharge  - Provide patient education as appropriate  Outcome: Progressing  Goal: Maintains/Returns to pre admission functional level  Description: INTERVENTIONS:  - Perform BMAT or MOVE assessment daily    - Set and communicate daily mobility goal to care team and patient/family/caregiver     - Collaborate with rehabilitation services on mobility goals if consulted  - Record patient progress and toleration of activity level   Outcome: Progressing     Problem: DISCHARGE PLANNING  Goal: Discharge to home or other facility with appropriate resources  Description: INTERVENTIONS:  - Identify barriers to discharge w/patient and caregiver  - Arrange for needed discharge resources and transportation as appropriate  - Identify discharge learning needs (meds, wound care, etc )  - Arrange for interpretive services to assist at discharge as needed  - Refer to Case Management Department for coordinating discharge planning if the patient needs post-hospital services based on physician/advanced practitioner order or complex needs related to functional status, cognitive ability, or social support system  Outcome: Progressing     Problem: Knowledge Deficit  Goal: Patient/family/caregiver demonstrates understanding of disease process, treatment plan, medications, and discharge instructions  Description: Complete learning assessment and assess knowledge base    Interventions:  - Provide teaching at level of understanding  - Provide teaching via preferred learning methods  Outcome: Progressing

## 2021-06-30 NOTE — PROGRESS NOTES
Patient wants to leave and reschedule his EEG because he does not like being confined to his room  He does not want to call us each and every time that he wants to get up  Patient educated on the importance of safety and ambulating with assistance while on EEG  Dr Shawn Álvarez notified

## 2021-06-30 NOTE — CASE MANAGEMENT
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, pt does not drive and is not currently working  Pt reported no use of DME in the home  Pt confirmed hx of [facility] with [facility] ago  No reported hx of STR or OPPT  PCP is Dr Emmett Cooper; pharmacy of choice is [facility] on [address]  Pt denied hx of MH or D&A  No reported LW/POA on file  Family to transport home upon discharge  CM reviewed d/c planning process including the following: identifying help at home, patient preference for d/c planning needs, Discharge Lounge, [facility] Meds to Bed program, availability of treatment team to discuss questions or concerns patient and/or family may have regarding understanding medications and recognizing signs and symptoms once discharged  CM also encouraged patient to follow up with all recommended appointments after discharge  Patient advised of importance for patient and family to participate in managing patients medical well being
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
## 2021-06-30 NOTE — DISCHARGE INSTRUCTIONS
Patient refused to stay in the epilepsy monitoring unit  He is leaving against medical advice  Fortunately, there was no significant change to his antiseizure medications to provoke seizures  He will sign an AMA form acknowledging refusal to participate in study  Thus, by leaving AMA it affects his doctors ability to provide care for his intractable epilepsy  No recommendations or counseling can be provided due to lack of information for this admission

## 2021-07-01 ENCOUNTER — TELEPHONE (OUTPATIENT)
Dept: NEUROLOGY | Facility: CLINIC | Age: 46
End: 2021-07-01

## 2021-07-01 ENCOUNTER — TRANSITIONAL CARE MANAGEMENT (OUTPATIENT)
Dept: INTERNAL MEDICINE CLINIC | Facility: CLINIC | Age: 46
End: 2021-07-01

## 2021-07-01 VITALS
HEIGHT: 71 IN | DIASTOLIC BLOOD PRESSURE: 129 MMHG | RESPIRATION RATE: 18 BRPM | HEART RATE: 76 BPM | BODY MASS INDEX: 33.47 KG/M2 | OXYGEN SATURATION: 97 % | SYSTOLIC BLOOD PRESSURE: 179 MMHG | TEMPERATURE: 97.9 F

## 2021-07-01 DIAGNOSIS — G40.909 NONINTRACTABLE EPILEPSY WITHOUT STATUS EPILEPTICUS (HCC): Primary | ICD-10-CM

## 2021-07-01 NOTE — UTILIZATION REVIEW
Initial Clinical Review    Admission: Date/Time/Statement:   Admission Orders (From admission, onward)     Ordered        06/30/21 1103  Inpatient Admission  Once                   Orders Placed This Encounter   Procedures    Inpatient Admission     Standing Status:   Standing     Number of Occurrences:   1     Order Specific Question:   Level of Care     Answer:   Level 2 Stepdown / HOT [14]     Order Specific Question:   Bed Type     Answer:   EMU [8]     Order Specific Question:   Estimated length of stay     Answer:   More than 2 Midnights     Order Specific Question:   Certification     Answer:   I certify that inpatient services are medically necessary for this patient for a duration of greater than two midnights  See H&P and MD Progress Notes for additional information about the patient's course of treatment  Initial Presentation:   3 y o  man with intractable epilepsy  He describe two types of seizures  One that starts with a sense of euphoria followed by very violent generalized tonic clonic shaking  A second seizure type involves altered awareness with chewing type of automatisms  He continues to have seizures despite multiple medication trials  This EMU admission was for pre-surgical evaluation  Along with his seizures, he reports visual hallucinations of well formed objects and people, such as a man standing or running pass him, also there are "orbs" in the ramírez  He also reports feelings of messages from numbers that he sees that can be premonitions  VS  98 1  66   18   1449/108   Will initiate Video EEG monitoring (EMU) ,  Order psych consult,   Implement seizure precautions and safety measures        Hospital Course:  Lex Braun was admitted to the epilepsy monitoring unit  Patient had barely 10 hours of continuous EEG monitoring  He was too restless for this admission  He reports that he does not like being confined to a room   [facility]    after about 5 hours, he decided that he cannot stay confined to a hospital room any more and demanded to leave  He signed paper work regarding leaving against medical advice  Because he is leaving before we had a chance of achieving the goals of this study no recommendation or counseling regarding his intractable epilepsy can be provided      EMU CONCLUSION:  Occasional left temporal delta activity  Normal PDR  No clinical event          Results from last 7 days   Lab Units 06/30/21  1136 06/30/21  1135   WBC Thousand/uL  --  9 35   HEMOGLOBIN g/dL  --  15 2   HEMATOCRIT %  --  47 6   PLATELETS Thousands/uL 118* 121*   NEUTROS ABS Thousands/µL  --  5 41         Results from last 7 days   Lab Units 06/30/21  1136   SODIUM mmol/L 143   POTASSIUM mmol/L 3 3*   CHLORIDE mmol/L 112*   CO2 mmol/L 26   ANION GAP mmol/L 5   BUN mg/dL 12   CREATININE mg/dL 1 00   EGFR ml/min/1 73sq m 106   CALCIUM mg/dL 9 0     Results from last 7 days   Lab Units 06/30/21  1136   AST U/L 17   ALT U/L 36   ALK PHOS U/L 68   TOTAL PROTEIN g/dL 7 1   ALBUMIN g/dL 3 8   TOTAL BILIRUBIN mg/dL 0 45     Results from last 7 days   Lab Units 06/30/21  1559   POC GLUCOSE mg/dl 140     Results from last 7 days   Lab Units 06/30/21  1136   GLUCOSE RANDOM mg/dL 91     Past Medical History:   Diagnosis Date    Diabetes mellitus ([facility] )     Hypertension     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, but path consistent with thrombotic fibrotic mass, marantic endocarditis    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    [facility]     possible, unclear history    TIA (transient ischemic attack)     Possible     Present on Admission:   Intractable epilepsy without status epilepticus ([facility] )   Essential hypertension   Type 2 diabetes mellitus with diabetic peripheral angiopathy without gangrene, without long-term current use of insulin (HCC)      Admitting Diagnosis: Absence epileptic syndrome, intractable, with status epilepticus [G40  A11]  Age/Sex: 39 y o  male     Admission Orders:   EMU monitoring;  Seizure precautions; routine VS;  Neuro cks q 4 hr ;  Reg diet ;    Meds:  Norvasc, lipitor, lovenox sq daiy;  HCTZ, keppra, lisinopril, trileptal, januvia     Network Utilization Review Department  ATTENTION: Please call with any questions or concerns to [phone] and carefully listen to the prompts so that you are directed to the right person  All voicemails are confidential   Frederich Ing all requests for admission clinical reviews, approved or denied determinations and any other requests to dedicated fax number below belonging to the campus where the patient is receiving treatment   List of dedicated fax numbers for the Facilities:  [address] DENIALS (Administrative/Medical Necessity) [phone]   [address] (Maternity/NICU/Pediatrics) [phone]   [address] [address] [place] [address] Devika Ovi [address] [place], [place] [address] [phone]

## 2021-07-01 NOTE — UTILIZATION REVIEW
Inpatient Admission Authorization Request   NOTIFICATION OF INPATIENT ADMISSION/INPATIENT AUTHORIZATION REQUEST   SERVICING FACILITY:   [facility]  Address: [address] [place] [address], [address] [place] [address]  Tax ID: [id number]  NPI: [id number]  Place of Service: Inpatient [address] [facility] Code: 24     ATTENDING PROVIDER:  Attending Name and NPI#: Lolita Keene Md [[id number]]  Address: [address] [place] [address], [address] [place] [address]  Phone: [phone]     UTILIZATION REVIEW CONTACT:  Mackenzie Valentine Utilization   Network Utilization Review Department  Phone: [phone]  Fax: [phone]  Email: [email or web address]     PHYSICIAN ADVISORY SERVICES:  FOR [id number]-LVJJ REVIEW - MEDICAL NECESSITY DENIAL  Phone: [phone]  Fax: [phone]  Email: [email or web address]     TYPE OF REQUEST:  Inpatient Status     ADMISSION INFORMATION:  ADMISSION DATE/TIME: 6/30/21  8:24 AM  PATIENT DIAGNOSIS CODE/DESCRIPTION:  Absence epileptic syndrome, intractable, with status epilepticus [G40  A11]  DISCHARGE DATE/TIME: 6/30/2021  8:11 PM  DISCHARGE DISPOSITION (IF DISCHARGED): Left against medical advice or discontinued care     IMPORTANT INFORMATION:  Please contact the Mackenzie Valentine directly with any questions or concerns regarding this request  Department voicemails are confidential     Send requests for admission clinical reviews, concurrent reviews, approvals, and administrative denials due to lack of clinical to fax [phone]

## 2021-07-01 NOTE — DISCHARGE SUMMARY
EPILEPSY ATTENDING DISCHARGE SUMMARY    Patient Name: Waldron Ganser  YOB: 1975  MRN: [id number]  [facility] Number: [de-identified]  Date of Admission:   6/30/2021  Date of Discharge: 06/30/21  Attending on Admission: Fred Pérez MD PhD    Attending on Discharge: Fred Pérez MD PhD    Admission Diagnosis:    1  Localization related epilepsy, symptomatic, intractable without status  G40 219    Discharge Diagnosis:  1  Localization related epilepsy, symptomatic, intractable without status  G40 219    Secondary Diagnoses:  1  Hypertension  2  Diabetes, type 2    Procedures:  Continuous Video EEG    Medication Changes:  None    Discharge Medications:  Levetiracetam 1500mg twice a day  Oxcarbazepine 900mg in AM and 1200mg in PM  Amlodipine 5mg daily  Atorvastatin 20mg daily  Hydrochlorothiazide 12 5mg daily  Januvia 100mg daily  Lisinopril 40mg daily    Brief History:  Patient is a 39 y o  man with intractable epilepsy  He describe two types of seizures  One that starts with a sense of euphoria followed by very violent generalized tonic clonic shaking  A second seizure type involves altered awareness with chewing type of automatisms  He continues to have seizures despite multiple medication trials  This EMU admission was for pre-surgical evaluation  Along with his seizures, he reports visual hallucinations of well formed objects and people, such as a man standing or running pass him, also there are "orbs" in the ramírez  He also reports feelings of messages from numbers that he sees that can be premonitions  Hospital Course:  Waldron Ganser was admitted to the epilepsy monitoring unit  Patient had barely 10 hours of continuous EEG monitoring  He was too restless for this admission  He reports that he does not like being confined to a room    We had discussed that with his seizures that he has very little warning and is at risk for physical injuries should he have a seizure from a standing position as we reduce his medications  He was asked to minimize standing  However, he felt that this was too restrictive and did not want to adhere to EMU safety precautions  We allowed him to have more mobility has long as he stays in his room, if it would help us achieve this admission for presurgical evaluation  However, after about 5 hours, he decided that he cannot stay confined to a hospital room any more and demanded to leave  He signed paper work regarding leaving against medical advice  Because he is leaving before we had a chance of achieving the goals of this study no recommendation or counseling regarding his intractable epilepsy can be provided  EMU CONCLUSION  Summary interictal findings:  Occasional left temporal delta activity  Normal PDR  Summary event findings:  No clinical event    Seizure Classification: No seizure to classify  Epilepsy Classification:  Intractable focal epilepsy    Condition at Discharge: stable     Disposition: Left against medical advice    Discharge Statement:  I spent 20 minutes discharging the patient (Start:  8PM; Stop:  8:20PM), greater than 50% of total time spent counseling the patient, coordination of care, providing post-discharge instructions and follow-up  This time was spent on the day of discharge  I had direct contact with the patient on the day of discharge  Additional time then spent on discharge activities

## 2021-07-01 NOTE — TELEPHONE ENCOUNTER
Patient called and left a VM stating that the EMU is not going to work at this time  Said that his BP and diabetes are higher than normal  States that he is uncomfortable being confined to 1 room  Patient discharged himself from the EMU  Asking if you would be able to give him a call to discuss      Please advise    [phone]

## 2021-07-01 NOTE — PROGRESS NOTES
1st attempt- Home phone-no ans device/Cell-"Mailbox full"  unable to LVM asking pt to return call for TCM documentation and/or appointment          By Nicky De La Torre

## 2021-07-02 LAB — LEVETIRACETAM SERPL-MCNC: 9.9 UG/ML (ref 10–40)

## 2021-07-02 NOTE — TELEPHONE ENCOUNTER
I called Sharita Conteh at the number listed in [address]  He did discuss his reasons for leaving the hospital at length  I did apologize about the situation and we discussed some of what happened and why  He was fully understanding and apologized several times  He was willing to come into the hospital on a week when I would be in the hospital  I rediscuss the policies/procedures in the EMU and he was agreeable  Can we work to schedule him on a week when I am in the EMU? The week in August may be ideal, but the Monday of my July week could be an alternative  Thanks!

## 2021-07-06 NOTE — TELEPHONE ENCOUNTER
Called and discussed with patient  Patient is agreeable to admission in August August 23 is unavailable but he is agreeable to coming in Tuesday 8/24  ADT21 submitted  Reviewed EMU information  Patient does not need information packet sent as he was just admitted into the EMU  Checklist started

## 2021-07-07 LAB — OXCARBAZEPINE SERPL-MCNC: 23 UG/ML (ref 10–35)

## 2021-07-07 NOTE — UTILIZATION REVIEW
2ND REQUEST FOR INPATIENT AUTHORIZATION      Inpatient Admission Authorization Request   NOTIFICATION OF INPATIENT ADMISSION/INPATIENT AUTHORIZATION REQUEST   SERVICING FACILITY:   [facility]  Address: [address] [place] [address], [address] [place] [address]  Tax ID: [id number]  NPI: [id number]  Place of Service: Inpatient [address] [facility] Code: 24     ATTENDING PROVIDER:  Attending Name and NPI#: Shayna Benton Md [[id number]]  Address: [address] [place] [address], [address] [place] [address]  Phone: [phone]     UTILIZATION REVIEW CONTACT:  Lacinda Gosselin, Utilization   Network Utilization Review Department  Phone: [phone]  Fax: [phone]  Email: [email or web address]     PHYSICIAN ADVISORY SERVICES:  FOR [id number]-HS-BOHO REVIEW - MEDICAL NECESSITY DENIAL  Phone: [phone]  Fax: [phone]  Email: [email or web address]     TYPE OF REQUEST:  Inpatient Status     ADMISSION INFORMATION:  ADMISSION DATE/TIME: 6/30/21  8:24 AM  PATIENT DIAGNOSIS CODE/DESCRIPTION:  Absence epileptic syndrome, intractable, with status epilepticus [G40  A11]  DISCHARGE DATE/TIME: 6/30/2021  8:11 PM  DISCHARGE DISPOSITION (IF DISCHARGED): Left against medical advice or discontinued care     IMPORTANT INFORMATION:  Please contact the Lacinda Gosselin directly with any questions or concerns regarding this request  Department voicemails are confidential     Send requests for admission clinical reviews, concurrent reviews, approvals, and administrative denials due to lack of clinical to fax [phone]         Initial Clinical Review    Admission: Date/Time/Statement:   Admission Orders (From admission, onward)     Ordered        06/30/21 1103  Inpatient Admission  Once                   Orders Placed This Encounter   Procedures    Inpatient Admission     Standing Status:   Standing     Number of Occurrences:   1     Order Specific Question:   Level of Care     Answer:   Level 2 Stepdown / HOT [14]     Order Specific Question:   Bed Type     Answer:   EMU [8]     Order Specific Question:   Estimated length of stay     Answer:   More than 2 Midnights     Order Specific Question:   Certification     Answer:   I certify that inpatient services are medically necessary for this patient for a duration of greater than two midnights  See H&P and MD Progress Notes for additional information about the patient's course of treatment  Initial Presentation:   3 y o  man with intractable epilepsy  He describe two types of seizures  One that starts with a sense of euphoria followed by very violent generalized tonic clonic shaking  A second seizure type involves altered awareness with chewing type of automatisms  He continues to have seizures despite multiple medication trials  This EMU admission was for pre-surgical evaluation  Along with his seizures, he reports visual hallucinations of well formed objects and people, such as a man standing or running pass him, also there are "orbs" in the ramírez  He also reports feelings of messages from numbers that he sees that can be premonitions  VS  98 1  66   18   1449/108   Will initiate Video EEG monitoring (EMU) ,  Order psych consult,   Implement seizure precautions and safety measures        Hospital Course:  Christa Leach was admitted to the epilepsy monitoring unit  Patient had barely 10 hours of continuous EEG monitoring  He was too restless for this admission  He reports that he does not like being confined to a room   [facility]   after about 5 hours, he decided that he cannot stay confined to a hospital room any more and demanded to leave  He signed paper work regarding leaving against medical advice  Because he is leaving before we had a chance of achieving the goals of this study no recommendation or counseling regarding his intractable epilepsy can be provided      EMU CONCLUSION:  Occasional left temporal delta activity  Normal PDR    No clinical event                                  Past Medical History:   Diagnosis Date    Diabetes mellitus ([facility] )     Hypertension     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, but path consistent with thrombotic fibrotic mass, marantic endocarditis    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    Seizure ([facility] )     possible, unclear history    TIA (transient ischemic attack)     Possible     Present on Admission:   Intractable epilepsy without status epilepticus ([facility] )   Essential hypertension   Type 2 diabetes mellitus with diabetic peripheral angiopathy without gangrene, without long-term current use of insulin ([facility])      Admitting Diagnosis: Absence epileptic syndrome, intractable, with status epilepticus [G40  A11]  Age/Sex: 39 y o  male     Admission Orders:   EMU monitoring;  Seizure precautions; routine VS;  Neuro cks q 4 hr ;  Reg diet ;    Meds:  Norvasc, lipitor, lovenox sq daiy;  HCTZ, keppra, lisinopril, trileptal, januvia     Network Utilization Review Department  ATTENTION: Please call with any questions or concerns to [phone] and carefully listen to the prompts so that you are directed to the right person  All voicemails are confidential   [employer] all requests for admission clinical reviews, approved or denied determinations and any other requests to dedicated fax number below belonging to the campus where the patient is receiving treatment   List of dedicated fax numbers for the Facilities:  [address] DENIALS (Administrative/Medical Necessity) [phone]   [address] (Maternity/NICU/Pediatrics) [phone]   [address] Sonya Chamberlain 4982 [address] [facility] [address] Devika Dior [facility] 1481 [address] [facility] [phone]       Notification of Discharge   This is a Notification of Discharge from our facility [facility]  Please be advised that this patient has been discharge from our facility  Below you will find the admission and discharge date and time including the patients disposition  UTILIZATION REVIEW CONTACT:  Sasha Hong  Utilization   Network Utilization Review Department  Phone: [phone] x carefully listen to the prompts  All voicemails are confidential   Email: [email or web address]     PHYSICIAN ADVISORY SERVICES:  FOR DGLV-[id number]-IYCL REVIEW - MEDICAL NECESSITY DENIAL  Phone: [phone]  Fax: [phone]  Email: [email or web address]     PRESENTATION DATE: 6/30/2021  8:24 AM  OBERVATION ADMISSION DATE:   INPATIENT ADMISSION DATE: 6/30/21  8:24 AM   DISCHARGE DATE: 6/30/2021  8:11 PM  DISPOSITION: Left against medical advice or discontinued care Left against medical advice or discontinued care      IMPORTANT INFORMATION:  Send all requests for admission clinical reviews, approved or denied determinations and any other requests to dedicated fax number below belonging to the campus where the patient is receiving treatment   List of dedicated fax numbers:  FACILITY NAME UR FAX NUMBER   ADMISSION DENIALS (Administrative/Medical Necessity) [phone]   [facility] (Maternity/NICU/Pediatrics) [phone]   Ariana Fernández [phone]   [facility] [phone]   Cassi Orozco [address] [place] [address] [address] [phone]   [address] Yue Garcia [phone]

## 2021-07-09 ENCOUNTER — TELEPHONE (OUTPATIENT)
Dept: NEUROLOGY | Facility: CLINIC | Age: 46
End: 2021-07-09

## 2021-07-09 DIAGNOSIS — G40.109 FOCAL EPILEPSY (HCC): Primary | ICD-10-CM

## 2021-07-09 NOTE — TELEPHONE ENCOUNTER
Okay  Well the oxcarbazepine level was good (actually higher than his prior)  Why don't we just have him rechecked prior to his appointment with Dr Sam Turner in August (1 week prior he should have his blood work done)

## 2021-07-09 NOTE — TELEPHONE ENCOUNTER
----- Message from Skinny Mendoza sent at 7/9/2021  8:27 AM EDT -----  Keppra level was low when he was in the hospital  Please make sure that he is taking this medication appropriately as ordered at home       Thanks

## 2021-07-09 NOTE — TELEPHONE ENCOUNTER
Called and discussed with patient  Said that he has taken his medication as prescribed and does not think he has missed any doses  States that he has not recent seizures

## 2021-07-09 NOTE — TELEPHONE ENCOUNTER
Patient returned call  States he spoke to wife and missed a dose of oxcarbazepine day of EMU admission  That was the only medication missed  Patient stated he thought if he didn't take medication, he would have a seizure sooner and be able to get out of EMU  Had taken keppra as prescribed

## 2021-07-09 NOTE — TELEPHONE ENCOUNTER
Patient made aware  Said that he did not take any AED the morning of his EMU admission  Requesting labs be mailed to him  Labs mailed

## 2021-07-14 NOTE — UTILIZATION REVIEW
3RD  REQUEST FOR INPATIENT AUTHORIZATION       Inpatient Admission Authorization Request   NOTIFICATION OF INPATIENT ADMISSION/INPATIENT AUTHORIZATION REQUEST   SERVICING FACILITY:   [facility]  Address: [address] [place] [address], [address] [place] [address]  Tax ID: [id number]  NPI: [id number]  Place of Service: Inpatient [address] [facility] Code: 24     ATTENDING PROVIDER:  Attending Name and NPI#: Dominique Alfred Md [[id number]]  Address: [address] [place] [address], [address] [place] [address]  Phone: [phone]     UTILIZATION REVIEW CONTACT:  [facility] Utilization   Network Utilization Review Department  Phone: [phone]  Fax: [phone]  Email: [email or web address]     PHYSICIAN ADVISORY SERVICES:  FOR CRFC-VP-NPJC REVIEW - MEDICAL NECESSITY DENIAL  Phone: [phone]  Fax: [phone]  Email: [email or web address]     TYPE OF REQUEST:  Inpatient Status     ADMISSION INFORMATION:  ADMISSION DATE/TIME: 6/30/21  8:24 AM  PATIENT DIAGNOSIS CODE/DESCRIPTION:  Absence epileptic syndrome, intractable, with status epilepticus [G40  A11]  DISCHARGE DATE/TIME: 6/30/2021  8:11 PM  DISCHARGE DISPOSITION (IF DISCHARGED): Left against medical advice or discontinued care     IMPORTANT INFORMATION:  Please contact the [facility] directly with any questions or concerns regarding this request  Department voicemails are confidential     Send requests for admission clinical reviews, concurrent reviews, approvals, and administrative denials due to lack of clinical to fax [phone]         Initial Clinical Review    Admission: Date/Time/Statement:   Admission Orders (From admission, onward)     Ordered        06/30/21 1103  Inpatient Admission  Once                   Orders Placed This Encounter   Procedures    Inpatient Admission     Standing Status:   Standing     Number of Occurrences:   1     Order Specific Question:   Level of Care     Answer:   Level 2 Stepdown / HOT [14]     Order Specific Question:   Bed Type     Answer:   EMU [8]     Order Specific Question:   Estimated length of stay     Answer:   More than 2 Midnights     Order Specific Question:   Certification     Answer:   I certify that inpatient services are medically necessary for this patient for a duration of greater than two midnights  See H&P and MD Progress Notes for additional information about the patient's course of treatment  Initial Presentation:   3 y o  man with intractable epilepsy  He describe two types of seizures  One that starts with a sense of euphoria followed by very violent generalized tonic clonic shaking  A second seizure type involves altered awareness with chewing type of automatisms  He continues to have seizures despite multiple medication trials  This EMU admission was for pre-surgical evaluation  Along with his seizures, he reports visual hallucinations of well formed objects and people, such as a man standing or running pass him, also there are "orbs" in the ramírez  He also reports feelings of messages from numbers that he sees that can be premonitions  VS  98 1  66   18   1449/108   Will initiate Video EEG monitoring (EMU) ,  Order psych consult,   Implement seizure precautions and safety measures        Hospital Course:  Giacomo Habermann was admitted to the epilepsy monitoring unit  Patient had barely 10 hours of continuous EEG monitoring  He was too restless for this admission  He reports that he does not like being confined to a room   Syble Migue   after about 5 hours, he decided that he cannot stay confined to a hospital room any more and demanded to leave  He signed paper work regarding leaving against medical advice  Because he is leaving before we had a chance of achieving the goals of this study no recommendation or counseling regarding his intractable epilepsy can be provided      EMU CONCLUSION:  Occasional left temporal delta activity  Normal PDR    No clinical event                                  Past Medical History:   Diagnosis Date    Diabetes mellitus ([facility] )     Hypertension     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, but path consistent with thrombotic fibrotic mass, marantic endocarditis    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    [facility] )     possible, unclear history    TIA (transient ischemic attack)     Possible     Present on Admission:   Intractable epilepsy without status epilepticus ([facility] )   Essential hypertension   Type 2 diabetes mellitus with diabetic peripheral angiopathy without gangrene, without long-term current use of insulin ([facility])      Admitting Diagnosis: Absence epileptic syndrome, intractable, with status epilepticus [G40  A11]  Age/Sex: 39 y o  male     Admission Orders:   EMU monitoring;  Seizure precautions; routine VS;  Neuro cks q 4 hr ;  Reg diet ;    Meds:  Norvasc, lipitor, lovenox sq daiy;  HCTZ, keppra, lisinopril, trileptal, januvia     Network Utilization Review Department  ATTENTION: Please call with any questions or concerns to [phone] and carefully listen to the prompts so that you are directed to the right person  All voicemails are confidential   Claudell Patient all requests for admission clinical reviews, approved or denied determinations and any other requests to dedicated fax number below belonging to the campus where the patient is receiving treatment   List of dedicated fax numbers for the Facilities:  [address] DENIALS (Administrative/Medical Necessity) [phone]   [address] (Maternity/NICU/Pediatrics) [phone] [address] [place] [address] [address] [facility] [address] [facility] [phone]       Notification of Discharge   This is a Notification of Discharge from our facility [facility]  Please be advised that this patient has been discharge from our facility  Below you will find the admission and discharge date and time including the patients disposition  UTILIZATION REVIEW CONTACT:  Tracey Faustin  Utilization   Network Utilization Review Department  Phone: [phone] x carefully listen to the prompts  All voicemails are confidential   Email: [email or web address]     PHYSICIAN ADVISORY SERVICES:  FOR HOQE-BI-QKKT REVIEW - MEDICAL NECESSITY DENIAL  Phone: [phone]  Fax: [phone]  Email: [email or web address]     PRESENTATION DATE: 6/30/2021  8:24 AM  OBERVATION ADMISSION DATE:   INPATIENT ADMISSION DATE: 6/30/21  8:24 AM   DISCHARGE DATE: 6/30/2021  8:11 PM  DISPOSITION: Left against medical advice or discontinued care Left against medical advice or discontinued care      IMPORTANT INFORMATION:  Send all requests for admission clinical reviews, approved or denied determinations and any other requests to dedicated fax number below belonging to the campus where the patient is receiving treatment   List of dedicated fax numbers:  FACILITY NAME UR FAX NUMBER   ADMISSION DENIALS (Administrative/Medical Necessity) [phone]   [facility] (Maternity/NICU/Pediatrics) [phone]   Yi Roasrio [phone]   Mary Schmitt [phone]   Leif Benson [address] [address] [address] [phone]   [address] [place] [facility] [phone]

## 2021-07-20 NOTE — TELEPHONE ENCOUNTER
Spoke with Julianne Cavanaugh at [facility]  EMU admission for 08/24/21 has been approved  Authorization# [id number]

## 2021-07-22 NOTE — UTILIZATION REVIEW
3RD  REQUEST FOR INPATIENT AUTHORIZATION       Inpatient Admission Authorization Request   NOTIFICATION OF INPATIENT ADMISSION/INPATIENT AUTHORIZATION REQUEST   SERVICING FACILITY:   [facility]  Address: [address] [place] [address], [address] [place] [address]  Tax ID: [id number]  NPI: [id number]  Place of Service: Inpatient [address] [facility] Code: 24     ATTENDING PROVIDER:  Attending Name and NPI#: Taylor Elaine Md [[id number]]  Address: [address] [place] [address], [address] [place] [address]  Phone: [phone]     UTILIZATION REVIEW CONTACT:  Adrianne Dunlap Utilization   Network Utilization Review Department  Phone: [phone]  Fax: [phone]  Email: [email or web address]     PHYSICIAN ADVISORY SERVICES:  FOR RSWV-WR-IEDL REVIEW - MEDICAL NECESSITY DENIAL  Phone: [phone]  Fax: [phone]  Email: [email or web address]     TYPE OF REQUEST:  Inpatient Status     ADMISSION INFORMATION:  ADMISSION DATE/TIME: 6/30/21  8:24 AM  PATIENT DIAGNOSIS CODE/DESCRIPTION:  Absence epileptic syndrome, intractable, with status epilepticus [G40  A11]  DISCHARGE DATE/TIME: 6/30/2021  8:11 PM  DISCHARGE DISPOSITION (IF DISCHARGED): Left against medical advice or discontinued care     IMPORTANT INFORMATION:  Please contact the Adrianne Dunlap directly with any questions or concerns regarding this request  Department voicemails are confidential     Send requests for admission clinical reviews, concurrent reviews, approvals, and administrative denials due to lack of clinical to fax [phone]         Initial Clinical Review    Admission: Date/Time/Statement:   Admission Orders (From admission, onward)     Ordered        06/30/21 1103  Inpatient Admission  Once                   Orders Placed This Encounter   Procedures    Inpatient Admission     Standing Status:   Standing     Number of Occurrences:   1     Order Specific Question:   Level of Care     Answer:   Level 2 Stepdown / HOT [14]     Order Specific Question:   Bed Type     Answer:   EMU [8]     Order Specific Question:   Estimated length of stay     Answer:   More than 2 Midnights     Order Specific Question:   Certification     Answer:   I certify that inpatient services are medically necessary for this patient for a duration of greater than two midnights  See H&P and MD Progress Notes for additional information about the patient's course of treatment  Initial Presentation:   3 y o  man with intractable epilepsy  He describe two types of seizures  One that starts with a sense of euphoria followed by very violent generalized tonic clonic shaking  A second seizure type involves altered awareness with chewing type of automatisms  He continues to have seizures despite multiple medication trials  This EMU admission was for pre-surgical evaluation  Along with his seizures, he reports visual hallucinations of well formed objects and people, such as a man standing or running pass him, also there are "orbs" in the ramírez  He also reports feelings of messages from numbers that he sees that can be premonitions  VS  98 1  66   18   1449/108   Will initiate Video EEG monitoring (EMU) ,  Order psych consult,   Implement seizure precautions and safety measures        Hospital Course:  Derick Lopez was admitted to the epilepsy monitoring unit  Patient had barely 10 hours of continuous EEG monitoring  He was too restless for this admission  He reports that he does not like being confined to a room   [facility]   after about 5 hours, he decided that he cannot stay confined to a hospital room any more and demanded to leave  He signed paper work regarding leaving against medical advice  Because he is leaving before we had a chance of achieving the goals of this study no recommendation or counseling regarding his intractable epilepsy can be provided      EMU CONCLUSION:  Occasional left temporal delta activity  Normal PDR    No clinical event                                  Past Medical History:   Diagnosis Date    Diabetes mellitus ([facility] )     Hypertension     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, but path consistent with thrombotic fibrotic mass, marantic endocarditis    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    [facility] )     possible, unclear history    TIA (transient ischemic attack)     Possible     Present on Admission:   Intractable epilepsy without status epilepticus ([facility] )   Essential hypertension   Type 2 diabetes mellitus with diabetic peripheral angiopathy without gangrene, without long-term current use of insulin ([facility])      Admitting Diagnosis: Absence epileptic syndrome, intractable, with status epilepticus [G40  A11]  Age/Sex: 39 y o  male     Admission Orders:   EMU monitoring;  Seizure precautions; routine VS;  Neuro cks q 4 hr ;  Reg diet ;    Meds:  Norvasc, lipitor, lovenox sq daiy;  HCTZ, keppra, lisinopril, trileptal, januvia     Network Utilization Review Department  ATTENTION: Please call with any questions or concerns to [phone] and carefully listen to the prompts so that you are directed to the right person  All voicemails are confidential   Kenisha Nadeem all requests for admission clinical reviews, approved or denied determinations and any other requests to dedicated fax number below belonging to the campus where the patient is receiving treatment   List of dedicated fax numbers for the Facilities:  [address] DENIALS (Administrative/Medical Necessity) [phone]   [address] (Maternity/NICU/Pediatrics) [phone]   [address] Sonya Chamberlain [address] [facility] [address] [facility] [facility] [phone]       Notification of Discharge   This is a Notification of Discharge from our facility [facility]  Please be advised that this patient has been discharge from our facility  Below you will find the admission and discharge date and time including the patients disposition  UTILIZATION REVIEW CONTACT:  Iraj Marie  Utilization   Network Utilization Review Department  Phone: [phone] x carefully listen to the prompts  All voicemails are confidential   Email: [email or web address]     PHYSICIAN ADVISORY SERVICES:  FOR [id number]-[id number] REVIEW - MEDICAL NECESSITY DENIAL  Phone: [phone]  Fax: [phone]  Email: [email or web address]     PRESENTATION DATE: 6/30/2021  8:24 AM  OBERVATION ADMISSION DATE:   INPATIENT ADMISSION DATE: 6/30/21  8:24 AM   DISCHARGE DATE: 6/30/2021  8:11 PM  DISPOSITION: Left against medical advice or discontinued care Left against medical advice or discontinued care      IMPORTANT INFORMATION:  Send all requests for admission clinical reviews, approved or denied determinations and any other requests to dedicated fax number below belonging to the campus where the patient is receiving treatment   List of dedicated fax numbers:  FACILITY NAME UR FAX NUMBER   ADMISSION DENIALS (Administrative/Medical Necessity) [phone]   [facility] (Maternity/NICU/Pediatrics) [phone]   Zachery Moody [phone]   Flako Henriquez [phone]   Darcy Denise [address] [address] [address] [phone]   [address] [place] [phone]

## 2021-07-24 DIAGNOSIS — I10 ESSENTIAL HYPERTENSION: Chronic | ICD-10-CM

## 2021-07-24 RX ORDER — HYDROCHLOROTHIAZIDE 12.5 MG/1
TABLET ORAL
Qty: 30 TABLET | Refills: 0 | Status: SHIPPED | OUTPATIENT
Start: 2021-07-24 | End: 2021-08-17

## 2021-08-02 ENCOUNTER — APPOINTMENT (OUTPATIENT)
Dept: LAB | Facility: CLINIC | Age: 46
End: 2021-08-02
Payer: COMMERCIAL

## 2021-08-02 ENCOUNTER — OFFICE VISIT (OUTPATIENT)
Dept: INTERNAL MEDICINE CLINIC | Facility: CLINIC | Age: 46
End: 2021-08-02
Payer: COMMERCIAL

## 2021-08-02 VITALS
HEART RATE: 71 BPM | TEMPERATURE: 98.2 F | BODY MASS INDEX: 32.9 KG/M2 | OXYGEN SATURATION: 98 % | WEIGHT: 235 LBS | SYSTOLIC BLOOD PRESSURE: 132 MMHG | DIASTOLIC BLOOD PRESSURE: 82 MMHG | HEIGHT: 71 IN

## 2021-08-02 DIAGNOSIS — D69.6 THROMBOCYTOPENIA (HCC): ICD-10-CM

## 2021-08-02 DIAGNOSIS — G40.919 INTRACTABLE EPILEPSY WITHOUT STATUS EPILEPTICUS, UNSPECIFIED EPILEPSY TYPE (HCC): ICD-10-CM

## 2021-08-02 DIAGNOSIS — K02.9 DENTAL CARIES: ICD-10-CM

## 2021-08-02 DIAGNOSIS — Z11.59 NEED FOR HEPATITIS C SCREENING TEST: ICD-10-CM

## 2021-08-02 DIAGNOSIS — E11.51 TYPE 2 DIABETES MELLITUS WITH DIABETIC PERIPHERAL ANGIOPATHY WITHOUT GANGRENE, WITHOUT LONG-TERM CURRENT USE OF INSULIN (HCC): Primary | Chronic | ICD-10-CM

## 2021-08-02 DIAGNOSIS — E11.51 TYPE 2 DIABETES MELLITUS WITH DIABETIC PERIPHERAL ANGIOPATHY WITHOUT GANGRENE, WITHOUT LONG-TERM CURRENT USE OF INSULIN (HCC): Chronic | ICD-10-CM

## 2021-08-02 DIAGNOSIS — I10 ESSENTIAL HYPERTENSION: Chronic | ICD-10-CM

## 2021-08-02 DIAGNOSIS — G40.109 FOCAL EPILEPSY (HCC): ICD-10-CM

## 2021-08-02 LAB
ANION GAP SERPL CALCULATED.3IONS-SCNC: 9 MMOL/L (ref 4–13)
BUN SERPL-MCNC: 12 MG/DL (ref 5–25)
CALCIUM SERPL-MCNC: 9 MG/DL (ref 8.3–10.1)
CHLORIDE SERPL-SCNC: 108 MMOL/L (ref 100–108)
CO2 SERPL-SCNC: 24 MMOL/L (ref 21–32)
CREAT SERPL-MCNC: 1.2 MG/DL (ref 0.6–1.3)
CREAT UR-MCNC: 284 MG/DL
ERYTHROCYTE [DISTWIDTH] IN BLOOD BY AUTOMATED COUNT: 13.3 % (ref 11.6–15.1)
EST. AVERAGE GLUCOSE BLD GHB EST-MCNC: 114 MG/DL
GFR SERPL CREATININE-BSD FRML MDRD: 85 ML/MIN/1.73SQ M
GLUCOSE P FAST SERPL-MCNC: 92 MG/DL (ref 65–99)
HBA1C MFR BLD: 5.6 %
HCT VFR BLD AUTO: 50.7 % (ref 36.5–49.3)
HCV AB SER QL: NORMAL
HGB BLD-MCNC: 16.6 G/DL (ref 12–17)
MCH RBC QN AUTO: 28.7 PG (ref 26.8–34.3)
MCHC RBC AUTO-ENTMCNC: 32.7 G/DL (ref 31.4–37.4)
MCV RBC AUTO: 88 FL (ref 82–98)
MICROALBUMIN UR-MCNC: 10.6 MG/L (ref 0–20)
MICROALBUMIN/CREAT 24H UR: 4 MG/G CREATININE (ref 0–30)
PLATELET # BLD AUTO: 115 THOUSANDS/UL (ref 149–390)
PMV BLD AUTO: 13.2 FL (ref 8.9–12.7)
POTASSIUM SERPL-SCNC: 3.7 MMOL/L (ref 3.5–5.3)
RBC # BLD AUTO: 5.79 MILLION/UL (ref 3.88–5.62)
SODIUM SERPL-SCNC: 141 MMOL/L (ref 136–145)
WBC # BLD AUTO: 8.26 THOUSAND/UL (ref 4.31–10.16)

## 2021-08-02 PROCEDURE — 3075F SYST BP GE 130 - 139MM HG: CPT | Performed by: INTERNAL MEDICINE

## 2021-08-02 PROCEDURE — 82570 ASSAY OF URINE CREATININE: CPT

## 2021-08-02 PROCEDURE — 1036F TOBACCO NON-USER: CPT | Performed by: INTERNAL MEDICINE

## 2021-08-02 PROCEDURE — 80183 DRUG SCRN QUANT OXCARBAZEPIN: CPT

## 2021-08-02 PROCEDURE — 3725F SCREEN DEPRESSION PERFORMED: CPT | Performed by: INTERNAL MEDICINE

## 2021-08-02 PROCEDURE — 3061F NEG MICROALBUMINURIA REV: CPT | Performed by: INTERNAL MEDICINE

## 2021-08-02 PROCEDURE — 99214 OFFICE O/P EST MOD 30 MIN: CPT | Performed by: INTERNAL MEDICINE

## 2021-08-02 PROCEDURE — 80177 DRUG SCRN QUAN LEVETIRACETAM: CPT

## 2021-08-02 PROCEDURE — 3079F DIAST BP 80-89 MM HG: CPT | Performed by: INTERNAL MEDICINE

## 2021-08-02 PROCEDURE — 36415 COLL VENOUS BLD VENIPUNCTURE: CPT

## 2021-08-02 PROCEDURE — 3044F HG A1C LEVEL LT 7.0%: CPT | Performed by: INTERNAL MEDICINE

## 2021-08-02 PROCEDURE — 85027 COMPLETE CBC AUTOMATED: CPT

## 2021-08-02 PROCEDURE — 80048 BASIC METABOLIC PNL TOTAL CA: CPT

## 2021-08-02 PROCEDURE — 3008F BODY MASS INDEX DOCD: CPT | Performed by: INTERNAL MEDICINE

## 2021-08-02 PROCEDURE — 86803 HEPATITIS C AB TEST: CPT

## 2021-08-02 PROCEDURE — 83036 HEMOGLOBIN GLYCOSYLATED A1C: CPT

## 2021-08-02 PROCEDURE — 82043 UR ALBUMIN QUANTITATIVE: CPT

## 2021-08-02 RX ORDER — CHLORHEXIDINE GLUCONATE 0.12 MG/ML
15 RINSE ORAL 2 TIMES DAILY
Qty: 473 ML | Refills: 3 | Status: SHIPPED | OUTPATIENT
Start: 2021-08-02 | End: 2022-02-03 | Stop reason: CLARIF

## 2021-08-02 RX ORDER — CLINDAMYCIN HYDROCHLORIDE 300 MG/1
300 CAPSULE ORAL 4 TIMES DAILY
Qty: 40 CAPSULE | Refills: 0 | Status: SHIPPED | OUTPATIENT
Start: 2021-08-02 | End: 2021-08-12

## 2021-08-02 RX ORDER — NAPROXEN 500 MG/1
500 TABLET ORAL 2 TIMES DAILY WITH MEALS
Qty: 60 TABLET | Refills: 3 | Status: SHIPPED | OUTPATIENT
Start: 2021-08-02 | End: 2021-11-24

## 2021-08-02 NOTE — PROGRESS NOTES
[facility] SARITA LEVIN    NAME: Nirav Canales  AGE: 39 y o  SEX: male  : 1975     DATE: 2021     Assessment and Plan:     1  Type 2 diabetes mellitus with diabetic peripheral angiopathy without gangrene, without long-term current use of insulin ([facility] )    Most recent A1c was 5 5 % on 2020  Continue oral hypoglycemics  Discussed with him to follow-up with eye doctor to recheck for retinopathy  Check updated laboratory testing     - Hemoglobin A1C; Future  - Basic metabolic panel; Future  - Microalbumin / creatinine urine ratio; Future    2  Essential hypertension    BP stable on recheck  Continue anti-hypertensives  3  Intractable epilepsy without status epilepticus, unspecified epilepsy type ([facility] )    Continue f/u with seizure specialist  Has to get repeat AED levels today  Continue keppra and trileptal     4  Need for hepatitis C screening test  - Hepatitis C antibody; Future    5  Thrombocytopenia ([facility] )    Check updated CBC  No episodes of bleeding     - CBC; Future    6  Dental caries    Needs to f/u with dentist     - chlorhexidine (PERIDEX) 0 12 % solution; Apply 15 mL to the mouth or throat 2 (two) times a day  Dispense: 473 mL; Refill: 3  - naproxen (NAPROSYN) 500 mg tablet; Take 1 tablet (500 mg total) by mouth 2 (two) times a day with meals  Dispense: 60 tablet; Refill: 3  - clindamycin (CLEOCIN) 300 MG capsule; Take 1 capsule (300 mg total) by mouth 4 (four) times a day for 10 days  Dispense: 40 capsule; Refill: 0    BMI Counseling: Body mass index is 32 78 kg/m²  The BMI is above normal  Nutrition recommendations include decreasing portion sizes, encouraging healthy choices of fruits and vegetables, limiting drinks that contain sugar, moderation in carbohydrate intake and increasing intake of lean protein  Exercise recommendations include exercising 3-5 times per week  Return in about 6 months (around 2022) for Follow-up       Chief Complaint:     Chief Complaint   Patient presents with    Physical Exam     med refil        History of Present Illness:     Patient presents for follow-up  Continues to suffer with epilepsy  Not too long ago had to be admitted to [facility] unit  His seizure medications were increased  Diabetes has been controlled  Most recent A1c was 5 5 % on 8/18/2020  Due for updated A1c  He needs one of his teeth pulled  Has been suffering with a lot of pain  Review of Systems:     Review of Systems   Constitutional: Negative for activity change, appetite change and fatigue  HENT:        Tooth ache   Respiratory: Negative for apnea, cough, chest tightness, shortness of breath and wheezing  Cardiovascular: Negative for chest pain, palpitations and leg swelling  Gastrointestinal: Negative for abdominal distention, abdominal pain, blood in stool, constipation, diarrhea, nausea and vomiting  Neurological: Negative for dizziness, weakness, light-headedness, numbness and headaches  Psychiatric/Behavioral: Negative for behavioral problems, confusion, hallucinations, sleep disturbance and suicidal ideas  The patient is not nervous/anxious  Objective:     /82   Pulse 71   Temp 98 2 °F (36 8 °C)   Ht 5' 11" (1 803 m)   Wt 107 kg (235 lb)   SpO2 98%   BMI 32 78 kg/m²     Physical Exam  Vitals reviewed  Constitutional:       General: He is not in acute distress  Appearance: He is well-developed  He is not diaphoretic  HENT:      Mouth/Throat:      Comments: Poor dentition  Neck:      Thyroid: No thyromegaly  Vascular: No JVD  Cardiovascular:      Rate and Rhythm: Normal rate and regular rhythm  Pulses: no weak pulses          Dorsalis pedis pulses are 1+ on the right side and 1+ on the left side  Posterior tibial pulses are 1+ on the right side and 1+ on the left side  Heart sounds: Normal heart sounds  No murmur heard       Pulmonary:      Effort: Pulmonary effort is normal  No respiratory distress  Breath sounds: Normal breath sounds  No wheezing or rales  Abdominal:      General: Bowel sounds are normal  There is no distension  Palpations: Abdomen is soft  Tenderness: There is no abdominal tenderness  Musculoskeletal:      Cervical back: Neck supple  Right lower leg: No edema  Left lower leg: No edema  Feet:      Right foot:      Skin integrity: No ulcer, skin breakdown, erythema, warmth, callus or dry skin  Left foot:      Skin integrity: No ulcer, skin breakdown, erythema, warmth, callus or dry skin  Lymphadenopathy:      Cervical: No cervical adenopathy  Skin:     General: Skin is warm and dry  Neurological:      Mental Status: He is alert  Psychiatric:         Mood and Affect: Mood normal          Behavior: Behavior normal      Diabetic Foot Exam  Patient's shoes and socks removed  Right Foot/Ankle   Right Foot Inspection  Skin Exam: skin normal and skin intact no dry skin, no warmth, no callus, no erythema, no maceration, no abnormal color, no pre-ulcer, no ulcer and no callus                          Toe Exam: ROM and strength within normal limits  Sensory     Proprioception: intact   Monofilament testing: intact  Vascular  Capillary refills: < 3 seconds  The right DP pulse is 1+  The right PT pulse is 1+  Left Foot/Ankle  Left Foot Inspection  Skin Exam: skin normal and skin intactno dry skin, no warmth, no erythema, no maceration, normal color, no pre-ulcer, no ulcer and no callus                         Toe Exam: ROM and strength within normal limits                   Sensory     Proprioception: intact  Monofilament: intact  Vascular  Capillary refills: < 3 seconds  The left DP pulse is 1+  The left PT pulse is 1+  Assign Risk Category:  No deformity present; No loss of protective sensation;  No weak pulses       Risk: 0    Irais Yu,   MEDICAL [address]

## 2021-08-02 NOTE — PATIENT INSTRUCTIONS
Type 2 Diabetes Management for Adults   WHAT YOU NEED TO KNOW:   What is type 2 diabetes? Type 2 diabetes is a disease that affects how your body uses glucose (sugar)  Either your body cannot make enough insulin, or it cannot use the insulin correctly  It is important to keep diabetes controlled to prevent damage to your heart, blood vessels, and other organs  What can I do to manage my blood sugar levels? · Talk to your care team if you become stressed about diabetes care  Sometimes being able to fit diabetes care into your life can cause increased stress  The stress can cause you not to take care of yourself properly  Your care team can help by offering tips about self-care  Your care team may suggest you talk to a mental health provider  The provider can listen and offer help with self-care issues  · Make healthy food choices  Work with a dietitian to develop a meal plan that works for you and your schedule  A dietitian can help you learn how to eat the right amount of carbohydrates during your meals and snacks  Carbohydrates can raise your blood sugar if you eat too many at one time  Some foods that contain carbohydrates include breads, cereals, rice, pasta, and sweets  · Drink water  Water can help your kidneys function properly  Decrease the amount of drinks with sugar substitutes you have, such as diet sodas  Avoid sugary drinks, such as regular sodas and fruit juice  · Get regular physical activity  Physical activity can help you get to your target blood sugar level goal and manage your weight  Get at least 150 minutes of moderate to vigorous aerobic physical activity each week  Do not miss more than 2 days in a row  Do not sit longer than 30 minutes at a time  Your healthcare provider can help you create an activity plan  The plan can include the best activities for you and can help you build your strength and endurance  · Maintain a healthy weight    Ask your healthcare provider how much you should weigh  Ask him or her to help you create a safe weight loss plan if you are overweight  · Check your blood sugar level as directed and as needed  Ask your healthcare provider what your blood sugar levels should be  Ask him or her to help you create a plan for times to check your level  · Take your diabetes medicine or insulin as directed  You may need diabetes medicine, insulin, or both to help control your blood sugar levels  Your healthcare provider will teach you how and when to take your diabetes medicine or insulin  · Go to all follow-up appointments  Your healthcare provider may need to check your A1c every 3 months  An A1c test shows the average amount of sugar in your blood over the past 2 to 3 months  Your healthcare provider will tell you what your A1c level should be  What do I need to know about high blood sugar? High blood sugar may not cause any symptoms  It may cause you to feel more thirsty than usual or urinate more often than usual  Over time, high blood sugar levels can damage your nerves, blood vessels, tissues, and organs  The following can increase your blood sugar levels:  · Large meals or large amounts of carbohydrates at one time    · Decreased physical activity    · Stress    · Illness     · A lower dose of medicine or insulin, or a late dose    What do I need to know about low blood sugar? You can prevent symptoms such as shakiness, dizziness, irritability, or confusion by preventing your blood sugar from going too low  · Treat low blood sugar right away  ? Drink 4 ounces of juice or have 1 tube of glucose gel  ? Check your blood sugar again 10 to 15 minutes later  ? When your blood sugar goes back to normal, eat a meal or snack to prevent another decrease  · Keep glucose gel, raisins, or even hard candy with you at all times to treat low blood sugar       · Your blood sugar can get too low if you take diabetes medicine or insulin and do not eat enough food  · If you use insulin, check your blood sugar before you exercise  ? If your blood sugar is below 100 mg/dL, eat 4 crackers, 2 ounces of raisins, or drink 4 ounces of juice  ? Check your blood sugar every 30 minutes if you exercise more than 1 hour  ? You may need a snack during or after exercise  What else can I do to manage my diabetes? · Wear medical alert jewelry or carry a card that says you have diabetes  Your provider can tell you where to get the items  · Be safe when you drive  If you feel like your blood sugar is low while you are driving, pull over and check your blood sugar level  Treat low blood sugar before you start driving again, if needed  Keep snacks such as raisins and crackers in your car  You can also keep glucose gel in your car  · Know the risks if you choose to drink alcohol  Alcohol can cause your blood sugar levels to be low if you use insulin  Alcohol can cause high blood sugar levels and weight gain if you drink too much  Women should limit alcohol to 1 drink a day  Men should limit alcohol to 2 drinks a day  A drink of alcohol is 12 ounces of beer, 5 ounces of wine, or 1½ ounces of liquor  · Do not smoke  Nicotine can damage blood vessels and make it more difficult to manage your diabetes  Do not use e-cigarettes or smokeless tobacco in place of cigarettes or to help you quit  They still contain nicotine  Ask your healthcare provider for information if you currently smoke and need help quitting  · Have screenings for complications of diabetes and other conditions that happen with diabetes  You will need to be screened for kidney problems, high cholesterol, high blood pressure, blood vessel problems, eye problems, and eating disorders  Some screenings may begin right away and some may happen within the first 5 years of diagnosis  You will need to continue screenings through your lifetime   Keep your follow-up appointments with all providers  · Ask about vaccines  You have a higher risk for serious illness if you get the flu, pneumonia, COVID-19, or hepatitis  Ask your healthcare provider if you should get a flu, pneumonia, or hepatitis B vaccine, and when to get the COVID-19 vaccine  CARE AGREEMENT:   You have the right to help plan your care  Learn about your health condition and how it may be treated  Discuss treatment options with your healthcare providers to decide what care you want to receive  You always have the right to refuse treatment  The above information is an  only  It is not intended as medical advice for individual conditions or treatments  Talk to your doctor, nurse or pharmacist before following any medical regimen to see if it is safe and effective for you  © Copyright [employer] 2021 Information is for End User's use only and may not be sold, redistributed or otherwise used for commercial purposes   All illustrations and images included in CareNotes® are the copyrighted property of A D A M , Inc  or [address] [place] [address]

## 2021-08-03 ENCOUNTER — TELEPHONE (OUTPATIENT)
Dept: INTERNAL MEDICINE CLINIC | Facility: CLINIC | Age: 46
End: 2021-08-03

## 2021-08-03 NOTE — TELEPHONE ENCOUNTER
----- Message from Norma Higgins DO sent at 8/3/2021  7:37 AM EDT -----  Labs are looking good  A1c very well controlled at 5 6%

## 2021-08-04 LAB
LEVETIRACETAM SERPL-MCNC: 34.6 UG/ML (ref 10–40)
OXCARBAZEPINE SERPL-MCNC: 34 UG/ML (ref 10–35)

## 2021-08-05 ENCOUNTER — TELEPHONE (OUTPATIENT)
Dept: INTERNAL MEDICINE CLINIC | Facility: CLINIC | Age: 46
End: 2021-08-05

## 2021-08-05 DIAGNOSIS — G40.919 INTRACTABLE EPILEPSY WITHOUT STATUS EPILEPTICUS, UNSPECIFIED EPILEPSY TYPE (HCC): Primary | ICD-10-CM

## 2021-08-05 NOTE — TELEPHONE ENCOUNTER
Pt has an appt w/neurology 8/16 and needs a Dr's referral put into Epic         DX code -   [phone]

## 2021-08-13 ENCOUNTER — TELEPHONE (OUTPATIENT)
Dept: NEUROLOGY | Facility: CLINIC | Age: 46
End: 2021-08-13

## 2021-08-16 ENCOUNTER — OFFICE VISIT (OUTPATIENT)
Dept: NEUROLOGY | Facility: CLINIC | Age: 46
End: 2021-08-16
Payer: COMMERCIAL

## 2021-08-16 VITALS
BODY MASS INDEX: 33.04 KG/M2 | WEIGHT: 236 LBS | HEIGHT: 71 IN | DIASTOLIC BLOOD PRESSURE: 106 MMHG | SYSTOLIC BLOOD PRESSURE: 168 MMHG

## 2021-08-16 DIAGNOSIS — R26.89 IMBALANCE: Primary | ICD-10-CM

## 2021-08-16 DIAGNOSIS — G40.919 INTRACTABLE EPILEPSY WITHOUT STATUS EPILEPTICUS, UNSPECIFIED EPILEPSY TYPE (HCC): ICD-10-CM

## 2021-08-16 PROCEDURE — 3077F SYST BP >= 140 MM HG: CPT | Performed by: PSYCHIATRY & NEUROLOGY

## 2021-08-16 PROCEDURE — 99214 OFFICE O/P EST MOD 30 MIN: CPT | Performed by: PSYCHIATRY & NEUROLOGY

## 2021-08-16 PROCEDURE — 3080F DIAST BP >= 90 MM HG: CPT | Performed by: PSYCHIATRY & NEUROLOGY

## 2021-08-16 RX ORDER — CLINDAMYCIN HYDROCHLORIDE 300 MG/1
300 CAPSULE ORAL 4 TIMES DAILY
COMMUNITY
End: 2022-02-03 | Stop reason: CLARIF

## 2021-08-16 NOTE — PATIENT INSTRUCTIONS
· We will see you next week Tuesday for the extended EEG monitoring  · Please take your early morning medications prior to arriving that day  · Please call the office if you have any questions

## 2021-08-16 NOTE — ASSESSMENT & PLAN NOTE
Patient presenting in follow up for seizures  Since last visit has had three additional seizures in late March/early April  These have followed the typical pattern of his seizures including aura of euphoria/giddiness followed by automatisms of lip smacking/licking and eye movements  Seizures are followed by a post-ictal period  Patient continues to meet criteria for medically-intractable seizures  He is scheduled for inpatient continuous EEG monitoring on 8/24/21 to better characterize and localize seizures  - Recent labs from 8/2/21: Levertiracetam level 34 6, Oxcarbazepine level 34  - Reviewed Neuropsychological testing  Noted to have cognitive dysfunction in several skill areas however these were unable to localize potential seizure focus  - Continue with current medications for now:   Levertiracetam 1500 mg BID   Oxcarbazepine 900 mg in AM and 1200 mg in PM  - EMU admission starting 8/24  - Patient will likely need additional imaging after EMU admission including possible 3T MRI and PET  - Close follow up after EMU admission for medication adjustments   May consider transitioning Oxcarbazepine to Zonisamide

## 2021-08-16 NOTE — ASSESSMENT & PLAN NOTE
Reports feeling of imbalance for many years  Has difficulty with standing still in one place and coordination  No significant change in vibratory sensation in lower extremities to suggest a neuropathy  Difficulty with tandem walk but relatively normal Romberg  Suspect that this is likely due to his chronic sodium-channel blocker activity    - Reviewed safety precautions with patient  - Can consider balance PT if this continues to be a problem  - May also need to consider transitioning from Oxcarbazepine to a different AED with different mechanism of action

## 2021-08-16 NOTE — PROGRESS NOTES
Patient ID: Lisette Osler is a 39 y o  male with history of HTN, seizure disorder, and DM2, who is returning to Neurology office for follow up of his seizure disorder  Assessment/Plan:    Intractable epilepsy without status epilepticus [facility])  Patient presenting in follow up for seizures  Since last visit has had three additional seizures in late March/early April  These have followed the typical pattern of his seizures including aura of euphoria/giddiness followed by automatisms of lip smacking/licking and eye movements  Seizures are followed by a post-ictal period  Patient continues to meet criteria for medically-intractable seizures  He is scheduled for inpatient continuous EEG monitoring on 8/24/21 to better characterize and localize seizures  - Recent labs from 8/2/21: Levertiracetam level 34 6, Oxcarbazepine level 34  - Reviewed Neuropsychological testing  Noted to have cognitive dysfunction in several skill areas however these were unable to localize potential seizure focus  - Continue with current medications for now:   Levertiracetam 1500 mg BID   Oxcarbazepine 900 mg in AM and 1200 mg in PM  - EMU admission starting 8/24  - Patient will likely need additional imaging after EMU admission including possible 3T MRI and PET  - Close follow up after EMU admission for medication adjustments  May consider transitioning Oxcarbazepine to Zonisamide    Imbalance  Reports feeling of imbalance for many years  Has difficulty with standing still in one place and coordination  No significant change in vibratory sensation in lower extremities to suggest a neuropathy  Difficulty with tandem walk but relatively normal Romberg  Suspect that this is likely due to his chronic sodium-channel blocker activity    - Reviewed safety precautions with patient  - Can consider balance PT if this continues to be a problem  - May also need to consider transitioning from Oxcarbazepine to a different AED with different mechanism of action        He will Return in about 3 months (around 11/16/2021) for Next scheduled follow up  Subjective:    HPI  I had the pleasure of seeing your patient, Kamran Wheeler, today in the Neurology clinic in follow up regarding seizures  He was last seen in office on 5/12/21 by Dr Hall Meckel  Also recently had a brief EMU admission on 6/30/21 under the care of Dr Jihan Mclean  Current AED Medications:  1  Levetiracetam 1500 mg BID  2  Oxcarbazepine 900 mg in AM and 1200 mg in PM  Other medications as per Epic  Prior Medications:   Phenytoin, Lamotrigine    Interval History:  Since last office visit, Ann Marie Goldman had a brief EMU admission for presurgical evaluation for intractable epilepsy  Patient preented to the hospital morning of 6/30/21 for monitoring  Unfortunately, Ann Marie Goldman was unable to tolerate continuous EEG monitoring due to restlessness and feeling too confined  Approxinately 10 hours of EEG monitoring was obtained which was inadequte to make any recommendations regarding his epilepsy  He ultimately left AMA evening of 6/30/21  Since then he has been in contact with the office  He is now scheduled for EMU admission starting 8/24/21  Ann Marie Goldman reports having 3 seizures since last visit  These occurred at the end of March and early April  One seizure was a "small" seizure and two were "bigger" seizures  He reports that all of the seizures "did not kick all the way in " Describes seizures as being preceded by episode of euphoria/giddiness which is then followed by lip automatisms and eye movements  Followed by post-ictal period which lasts approximately 30 minutes after a small seizure and up to 1 day for a bigger seizure event  Continues to see objects and orbs in his vision and in dreams which may be related to his seizure activity    Feeling more off-balanced and "dizzy " Feels like he is clumsy and feels like he is stumbling  Has a hard time standing still in one place for any extended period of time   This has been going on since the beginning of his seizures, before starting any of the medications  This is limiting his daily life as he is unable to exercise or run which he used to do on a daily basis  Also reports having dental pain again  He has previously had a seizure with dental pain in the past  He saw his PCP and is currently on clindamycin, chlorhexidine, and Naproxen  He is scheduled to see a dentist in September  Patient had neuropsychological evaluation in 4/2021 with Dr Mago Mcclelland  This testing revealed cognitive dysfunction in several skill areas  This study was limited in localizing potential seizure focus  Objective:    Blood pressure (!) 168/106, height 5' 11" (1 803 m), weight 107 kg (236 lb)  Physical Exam  Vitals and nursing note reviewed  Constitutional:       Appearance: Normal appearance  He is obese  HENT:      Head: Normocephalic and atraumatic  Right Ear: Hearing and external ear normal       Left Ear: Hearing and external ear normal       Nose: Nose normal       Mouth/Throat:      Mouth: Mucous membranes are moist       Pharynx: Oropharynx is clear  Eyes:      General: Lids are normal       Extraocular Movements: Extraocular movements intact  Conjunctiva/sclera: Conjunctivae normal       Pupils: Pupils are equal, round, and reactive to light  Cardiovascular:      Rate and Rhythm: Normal rate  Pulses: Normal pulses  Pulmonary:      Effort: Pulmonary effort is normal  No respiratory distress  Musculoskeletal:      Cervical back: Normal range of motion and neck supple  Right lower leg: No edema  Left lower leg: No edema  Neurological:      General: No focal deficit present  Mental Status: He is oriented to person, place, and time  Coordination: Romberg sign negative  Deep Tendon Reflexes: Strength normal       Reflex Scores:       Bicep reflexes are 2+ on the right side and 2+ on the left side         Brachioradialis reflexes are 2+ on the right side and 2+ on the left side  Patellar reflexes are 2+ on the right side and 2+ on the left side  Achilles reflexes are 2+ on the right side and 2+ on the left side  Psychiatric:         Mood and Affect: Mood normal          Speech: Speech normal          Behavior: Behavior normal          Neurological Exam  Mental Status  Awake, alert and oriented to person, place and time  Speech is normal  Language is fluent with no aphasia  Cranial Nerves  CN II: Visual acuity is normal  Visual fields full to confrontation  CN III, IV, VI: Extraocular movements intact bilaterally  Normal lids and orbits bilaterally  Pupils equal round and reactive to light bilaterally  CN V: Facial sensation is normal   CN VII: Full and symmetric facial movement  CN VIII:  Right: Hearing is normal   Left: Hearing is normal   CN IX, X: Palate elevates symmetrically  CN XI: Shoulder shrug strength is normal   CN XII: Tongue midline without atrophy or fasciculations  Motor  Normal muscle bulk throughout  Strength is 5/5 throughout all four extremities  Sensory  Light touch is normal in upper and lower extremities  Temperature is normal in upper and lower extremities  Vibration is normal in upper and lower extremities  Proprioception is normal in upper and lower extremities  Reflexes                                           Right                      Left  Brachioradialis                    2+                         2+  Biceps                                 2+                         2+  Patellar                                2+                         2+  Achilles                                2+                         2+    Coordination  Right: Finger-to-nose normal  Rapid alternating movement normal   Left: Finger-to-nose normal  Rapid alternating movement normal     Gait  Casual gait is normal including stance, stride, and arm swing   Tandem gait abnormality: Imbalance and clumsiness, needed to hold on to examination table to take 2 steps with tandem gait  Unable to complete without holding on to something for support  Romberg is absent  ROS:    Review of Systems   Constitutional: Negative  Negative for appetite change and fever  HENT: Negative  Negative for hearing loss, tinnitus, trouble swallowing and voice change  Eyes: Negative  Negative for photophobia and pain  Respiratory: Negative  Negative for shortness of breath  Cardiovascular: Negative  Negative for palpitations  Gastrointestinal: Negative  Negative for nausea and vomiting  Endocrine: Negative  Negative for cold intolerance  Genitourinary: Negative  Negative for dysuria, frequency and urgency  Musculoskeletal: Negative  Negative for myalgias and neck pain  Skin: Negative  Negative for rash  Neurological: Positive for dizziness (patient cannot  one place because the dizziness becomes severe) and seizures (3 total seizures since last visit)  Negative for tremors, syncope, facial asymmetry, speech difficulty, weakness, light-headedness, numbness and headaches  Hematological: Negative  Does not bruise/bleed easily  Psychiatric/Behavioral: Positive for sleep disturbance  Negative for confusion and hallucinations  All other systems reviewed and are negative  I personally reviewed the ROS that was entered by the medical assistant    ======    Thank you for allowing me to participate in the care of your patient, Ryley Fischer      DO [facility] Res[facility], PGY-2

## 2021-08-17 DIAGNOSIS — I10 ESSENTIAL HYPERTENSION: Chronic | ICD-10-CM

## 2021-08-17 RX ORDER — HYDROCHLOROTHIAZIDE 12.5 MG/1
TABLET ORAL
Qty: 30 TABLET | Refills: 0 | Status: SHIPPED | OUTPATIENT
Start: 2021-08-17 | End: 2021-09-18

## 2021-08-20 DIAGNOSIS — E11.51 TYPE 2 DIABETES MELLITUS WITH DIABETIC PERIPHERAL ANGIOPATHY WITHOUT GANGRENE, WITHOUT LONG-TERM CURRENT USE OF INSULIN (HCC): Chronic | ICD-10-CM

## 2021-08-20 DIAGNOSIS — G40.909 NONINTRACTABLE EPILEPSY WITHOUT STATUS EPILEPTICUS, UNSPECIFIED EPILEPSY TYPE (HCC): Chronic | ICD-10-CM

## 2021-08-20 RX ORDER — LEVETIRACETAM 500 MG/1
TABLET ORAL
Qty: 180 TABLET | Refills: 11 | Status: SHIPPED | OUTPATIENT
Start: 2021-08-20

## 2021-08-21 RX ORDER — SITAGLIPTIN 100 MG/1
TABLET, FILM COATED ORAL
Qty: 30 TABLET | Refills: 1 | Status: SHIPPED | OUTPATIENT
Start: 2021-08-21 | End: 2021-10-16

## 2021-08-24 ENCOUNTER — APPOINTMENT (INPATIENT)
Dept: NEUROLOGY | Facility: CLINIC | Age: 46
DRG: 053 | End: 2021-08-24
Payer: COMMERCIAL

## 2021-08-24 ENCOUNTER — HOSPITAL ENCOUNTER (INPATIENT)
Facility: HOSPITAL | Age: 46
LOS: 1 days | Discharge: HOME | DRG: 053 | End: 2021-08-25
Attending: PSYCHIATRY & NEUROLOGY | Admitting: PSYCHIATRY & NEUROLOGY
Payer: COMMERCIAL

## 2021-08-24 LAB
ALBUMIN SERPL BCP-MCNC: 3.8 G/DL (ref 3.5–5)
ALP SERPL-CCNC: 77 U/L (ref 46–116)
ALT SERPL W P-5'-P-CCNC: 25 U/L (ref 12–78)
ANION GAP SERPL CALCULATED.3IONS-SCNC: 3 MMOL/L (ref 4–13)
AST SERPL W P-5'-P-CCNC: 21 U/L (ref 5–45)
BASOPHILS # BLD AUTO: 0.04 THOUSANDS/ΜL (ref 0–0.1)
BASOPHILS NFR BLD AUTO: 1 % (ref 0–1)
BILIRUB SERPL-MCNC: 0.8 MG/DL (ref 0.2–1)
BUN SERPL-MCNC: 17 MG/DL (ref 5–25)
CALCIUM SERPL-MCNC: 8.4 MG/DL (ref 8.3–10.1)
CHLORIDE SERPL-SCNC: 111 MMOL/L (ref 100–108)
CO2 SERPL-SCNC: 28 MMOL/L (ref 21–32)
CREAT SERPL-MCNC: 1.03 MG/DL (ref 0.6–1.3)
EOSINOPHIL # BLD AUTO: 0.24 THOUSAND/ΜL (ref 0–0.61)
EOSINOPHIL NFR BLD AUTO: 3 % (ref 0–6)
ERYTHROCYTE [DISTWIDTH] IN BLOOD BY AUTOMATED COUNT: 13.3 % (ref 11.6–15.1)
GFR SERPL CREATININE-BSD FRML MDRD: 102 ML/MIN/1.73SQ M
GLUCOSE SERPL-MCNC: 92 MG/DL (ref 65–140)
HCT VFR BLD AUTO: 45.8 % (ref 36.5–49.3)
HGB BLD-MCNC: 15 G/DL (ref 12–17)
IMM GRANULOCYTES # BLD AUTO: 0.01 THOUSAND/UL (ref 0–0.2)
IMM GRANULOCYTES NFR BLD AUTO: 0 % (ref 0–2)
LYMPHOCYTES # BLD AUTO: 2.78 THOUSANDS/ΜL (ref 0.6–4.47)
LYMPHOCYTES NFR BLD AUTO: 37 % (ref 14–44)
MCH RBC QN AUTO: 28.6 PG (ref 26.8–34.3)
MCHC RBC AUTO-ENTMCNC: 32.8 G/DL (ref 31.4–37.4)
MCV RBC AUTO: 87 FL (ref 82–98)
MONOCYTES # BLD AUTO: 0.55 THOUSAND/ΜL (ref 0.17–1.22)
MONOCYTES NFR BLD AUTO: 7 % (ref 4–12)
NEUTROPHILS # BLD AUTO: 3.91 THOUSANDS/ΜL (ref 1.85–7.62)
NEUTS SEG NFR BLD AUTO: 52 % (ref 43–75)
NRBC BLD AUTO-RTO: 0 /100 WBCS
PLATELET # BLD AUTO: 122 THOUSANDS/UL (ref 149–390)
PMV BLD AUTO: 12.6 FL (ref 8.9–12.7)
POTASSIUM SERPL-SCNC: 3.3 MMOL/L (ref 3.5–5.3)
PROT SERPL-MCNC: 7.3 G/DL (ref 6.4–8.2)
RBC # BLD AUTO: 5.24 MILLION/UL (ref 3.88–5.62)
SODIUM SERPL-SCNC: 142 MMOL/L (ref 136–145)
WBC # BLD AUTO: 7.53 THOUSAND/UL (ref 4.31–10.16)

## 2021-08-24 PROCEDURE — 95715 VEEG EA 12-26HR INTMT MNTR: CPT

## 2021-08-24 PROCEDURE — 99223 1ST HOSP IP/OBS HIGH 75: CPT | Performed by: PSYCHIATRY & NEUROLOGY

## 2021-08-24 PROCEDURE — 80053 COMPREHEN METABOLIC PANEL: CPT | Performed by: NURSE PRACTITIONER

## 2021-08-24 PROCEDURE — 80183 DRUG SCRN QUANT OXCARBAZEPIN: CPT | Performed by: NURSE PRACTITIONER

## 2021-08-24 PROCEDURE — 95700 EEG CONT REC W/VID EEG TECH: CPT

## 2021-08-24 PROCEDURE — 80177 DRUG SCRN QUAN LEVETIRACETAM: CPT | Performed by: NURSE PRACTITIONER

## 2021-08-24 PROCEDURE — 85025 COMPLETE CBC W/AUTO DIFF WBC: CPT | Performed by: NURSE PRACTITIONER

## 2021-08-24 RX ORDER — LISINOPRIL 20 MG/1
40 TABLET ORAL DAILY
Status: DISCONTINUED | OUTPATIENT
Start: 2021-08-25 | End: 2021-08-25 | Stop reason: HOSPADM

## 2021-08-24 RX ORDER — POLYETHYLENE GLYCOL 3350 17 G/17G
17 POWDER, FOR SOLUTION ORAL DAILY
Status: DISCONTINUED | OUTPATIENT
Start: 2021-08-24 | End: 2021-08-25 | Stop reason: HOSPADM

## 2021-08-24 RX ORDER — ACETAMINOPHEN 325 MG/1
650 TABLET ORAL EVERY 6 HOURS PRN
Status: DISCONTINUED | OUTPATIENT
Start: 2021-08-24 | End: 2021-08-25 | Stop reason: HOSPADM

## 2021-08-24 RX ORDER — LEVETIRACETAM 750 MG/1
750 TABLET ORAL EVERY 12 HOURS SCHEDULED
Status: DISCONTINUED | OUTPATIENT
Start: 2021-08-24 | End: 2021-08-25 | Stop reason: HOSPADM

## 2021-08-24 RX ORDER — ONDANSETRON 2 MG/ML
4 INJECTION INTRAMUSCULAR; INTRAVENOUS EVERY 6 HOURS PRN
Status: DISCONTINUED | OUTPATIENT
Start: 2021-08-24 | End: 2021-08-25 | Stop reason: HOSPADM

## 2021-08-24 RX ORDER — AMLODIPINE BESYLATE 5 MG/1
5 TABLET ORAL DAILY
Status: DISCONTINUED | OUTPATIENT
Start: 2021-08-25 | End: 2021-08-25 | Stop reason: HOSPADM

## 2021-08-24 RX ORDER — LEVETIRACETAM 750 MG/1
1500 TABLET ORAL EVERY 12 HOURS SCHEDULED
Status: DISCONTINUED | OUTPATIENT
Start: 2021-08-24 | End: 2021-08-24

## 2021-08-24 RX ORDER — CHLORHEXIDINE GLUCONATE 0.12 MG/ML
15 RINSE ORAL 2 TIMES DAILY
Status: DISCONTINUED | OUTPATIENT
Start: 2021-08-24 | End: 2021-08-25 | Stop reason: HOSPADM

## 2021-08-24 RX ORDER — ATORVASTATIN CALCIUM 20 MG/1
20 TABLET, FILM COATED ORAL DAILY
Status: DISCONTINUED | OUTPATIENT
Start: 2021-08-25 | End: 2021-08-25 | Stop reason: HOSPADM

## 2021-08-24 RX ORDER — OXCARBAZEPINE 300 MG/1
900 TABLET, FILM COATED ORAL EVERY MORNING
Status: DISCONTINUED | OUTPATIENT
Start: 2021-08-25 | End: 2021-08-25 | Stop reason: HOSPADM

## 2021-08-24 RX ORDER — OXCARBAZEPINE 300 MG/1
1200 TABLET, FILM COATED ORAL
Status: DISCONTINUED | OUTPATIENT
Start: 2021-08-24 | End: 2021-08-25 | Stop reason: HOSPADM

## 2021-08-24 RX ORDER — HYDROCHLOROTHIAZIDE 12.5 MG/1
12.5 TABLET ORAL DAILY
Status: DISCONTINUED | OUTPATIENT
Start: 2021-08-25 | End: 2021-08-25 | Stop reason: HOSPADM

## 2021-08-24 RX ORDER — DIPHENHYDRAMINE HCL 25 MG
25 TABLET ORAL EVERY 8 HOURS PRN
Status: DISCONTINUED | OUTPATIENT
Start: 2021-08-24 | End: 2021-08-25 | Stop reason: HOSPADM

## 2021-08-24 RX ORDER — NAPROXEN 500 MG/1
500 TABLET ORAL 2 TIMES DAILY WITH MEALS
Status: DISCONTINUED | OUTPATIENT
Start: 2021-08-24 | End: 2021-08-25 | Stop reason: HOSPADM

## 2021-08-24 RX ORDER — LORAZEPAM 2 MG/ML
2 INJECTION INTRAMUSCULAR EVERY 8 HOURS PRN
Status: DISCONTINUED | OUTPATIENT
Start: 2021-08-24 | End: 2021-08-25 | Stop reason: HOSPADM

## 2021-08-24 RX ORDER — CLINDAMYCIN HYDROCHLORIDE 150 MG/1
300 CAPSULE ORAL 4 TIMES DAILY
Status: DISCONTINUED | OUTPATIENT
Start: 2021-08-24 | End: 2021-08-25 | Stop reason: HOSPADM

## 2021-08-24 RX ADMIN — CLINDAMYCIN HYDROCHLORIDE 300 MG: 150 CAPSULE ORAL at 17:25

## 2021-08-24 RX ADMIN — CLINDAMYCIN HYDROCHLORIDE 300 MG: 150 CAPSULE ORAL at 14:05

## 2021-08-24 RX ADMIN — CLINDAMYCIN HYDROCHLORIDE 300 MG: 150 CAPSULE ORAL at 21:12

## 2021-08-24 RX ADMIN — LEVETIRACETAM 750 MG: 750 TABLET ORAL at 21:12

## 2021-08-24 RX ADMIN — OXCARBAZEPINE 1200 MG: 300 TABLET, FILM COATED ORAL at 21:11

## 2021-08-24 RX ADMIN — NAPROXEN 500 MG: 500 TABLET ORAL at 17:26

## 2021-08-24 RX ADMIN — ENOXAPARIN SODIUM 40 MG: 40 INJECTION SUBCUTANEOUS at 14:05
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
## 2021-08-24 NOTE — PLAN OF CARE
Problem: Potential for Falls  Goal: Patient will remain free of falls  Description: INTERVENTIONS:  - Educate patient/family on patient safety including physical limitations  - Instruct patient to call for assistance with activity   - Consult OT/PT to assist with strengthening/mobility   - Keep Call bell within reach  - Keep bed low and locked with side rails adjusted as appropriate  - Keep care items and personal belongings within reach  - Initiate and maintain comfort rounds  - Make Fall Risk Sign visible to staff  - Offer Toileting every  Hours, in advance of need  - Initiate/Maintain alarm  - Obtain necessary fall risk management equipment:   - Apply yellow socks and bracelet for high fall risk patients  - Consider moving patient to room near nurses station  Outcome: Progressing     Problem: SAFETY ADULT  Goal: Patient will remain free of falls  Description: INTERVENTIONS:  - Educate patient/family on patient safety including physical limitations  - Instruct patient to call for assistance with activity   - Consult OT/PT to assist with strengthening/mobility   - Keep Call bell within reach  - Keep bed low and locked with side rails adjusted as appropriate  - Keep care items and personal belongings within reach  - Initiate and maintain comfort rounds  - Make Fall Risk Sign visible to staff  - Offer Toileting every  Hours, in advance of need  - Initiate/Maintain alarm  - Obtain necessary fall risk management equipment:   - Apply yellow socks and bracelet for high fall risk patients  - Consider moving patient to room near nurses station  Outcome: Progressing  Goal: Maintain or return to baseline ADL function  Description: INTERVENTIONS:  -  Assess patient's ability to carry out ADLs; assess patient's baseline for ADL function and identify physical deficits which impact ability to perform ADLs (bathing, care of mouth/teeth, toileting, grooming, dressing, etc )  - Assess/evaluate cause of self-care deficits   - Assess range of motion  - Assess patient's mobility; develop plan if impaired  - Assess patient's need for assistive devices and provide as appropriate  - Encourage maximum independence but intervene and supervise when necessary  - Involve family in performance of ADLs  - Assess for home care needs following discharge   - Consider OT consult to assist with ADL evaluation and planning for discharge  - Provide patient education as appropriate  Outcome: Progressing  Goal: Maintains/Returns to pre admission functional level  Description: INTERVENTIONS:  - Perform BMAT or MOVE assessment daily    - Set and communicate daily mobility goal to care team and patient/family/caregiver  - Collaborate with rehabilitation services on mobility goals if consulted  - Perform Range of Motion  times a day  - Reposition patient every  hours    - Dangle patient  times a day  - Stand patient  times a day  - Ambulate patient  times a day  - Out of bed to chair  times a day   - Out of bed for meals times a day  - Out of bed for toileting  - Record patient progress and toleration of activity level   Outcome: Progressing     Problem: DISCHARGE PLANNING  Goal: Discharge to home or other facility with appropriate resources  Description: INTERVENTIONS:  - Identify barriers to discharge w/patient and caregiver  - Arrange for needed discharge resources and transportation as appropriate  - Identify discharge learning needs (meds, wound care, etc )  - Arrange for interpretive services to assist at discharge as needed  - Refer to Case Management Department for coordinating discharge planning if the patient needs post-hospital services based on physician/advanced practitioner order or complex needs related to functional status, cognitive ability, or social support system  Outcome: Progressing     Problem: Knowledge Deficit  Goal: Patient/family/caregiver demonstrates understanding of disease process, treatment plan, medications, and discharge instructions  Description: Complete learning assessment and assess knowledge base  Interventions:  - Provide teaching at level of understanding  - Provide teaching via preferred learning methods  Outcome: Progressing     Problem: NEUROSENSORY - ADULT  Goal: Achieves stable or improved neurological status  Description: INTERVENTIONS  - Monitor and report changes in neurological status  - Monitor vital signs such as temperature, blood pressure, glucose, and any other labs ordered   - Initiate measures to prevent increased intracranial pressure  - Monitor for seizure activity and implement precautions if appropriate      Outcome: Progressing  Goal: Remains free of injury related to seizures activity  Description: INTERVENTIONS  - Maintain airway, patient safety  and administer oxygen as ordered  - Monitor patient for seizure activity, document and report duration and description of seizure to physician/advanced practitioner  - If seizure occurs,  ensure patient safety during seizure  - Reorient patient post seizure  - Seizure pads on all 4 side rails  - Instruct patient/family to notify RN of any seizure activity including if an aura is experienced  - Instruct patient/family to call for assistance with activity based on nursing assessment  - Administer anti-seizure medications if ordered    Outcome: Progressing  Goal: Achieves maximal functionality and self care  Description: INTERVENTIONS  - Monitor swallowing and airway patency with patient fatigue and changes in neurological status  - Encourage and assist patient to increase activity and self care     - Encourage visually impaired, hearing impaired and aphasic patients to use assistive/communication devices  Outcome: Progressing

## 2021-08-25 ENCOUNTER — TRANSITIONAL CARE MANAGEMENT (OUTPATIENT)
Dept: INTERNAL MEDICINE CLINIC | Facility: CLINIC | Age: 46
End: 2021-08-25

## 2021-08-25 VITALS
HEART RATE: 73 BPM | BODY MASS INDEX: 33.04 KG/M2 | OXYGEN SATURATION: 96 % | HEIGHT: 71 IN | SYSTOLIC BLOOD PRESSURE: 168 MMHG | WEIGHT: 236 LBS | RESPIRATION RATE: 18 BRPM | DIASTOLIC BLOOD PRESSURE: 95 MMHG | TEMPERATURE: 97.4 F

## 2021-08-25 LAB — GLUCOSE SERPL-MCNC: 86 MG/DL (ref 65–140)

## 2021-08-25 PROCEDURE — 99239 HOSP IP/OBS DSCHRG MGMT >30: CPT | Performed by: PSYCHIATRY & NEUROLOGY

## 2021-08-25 PROCEDURE — 95720 EEG PHY/QHP EA INCR W/VEEG: CPT | Performed by: PSYCHIATRY & NEUROLOGY

## 2021-08-25 PROCEDURE — 82948 REAGENT STRIP/BLOOD GLUCOSE: CPT

## 2021-08-25 RX ORDER — LEVETIRACETAM 750 MG/1
750 TABLET ORAL ONCE
Status: COMPLETED | OUTPATIENT
Start: 2021-08-25 | End: 2021-08-25

## 2021-08-25 RX ADMIN — SITAGLIPTIN 100 MG: 100 TABLET, FILM COATED ORAL at 08:31

## 2021-08-25 RX ADMIN — AMLODIPINE BESYLATE 5 MG: 5 TABLET ORAL at 08:35

## 2021-08-25 RX ADMIN — ATORVASTATIN CALCIUM 20 MG: 20 TABLET, FILM COATED ORAL at 08:35

## 2021-08-25 RX ADMIN — HYDROCHLOROTHIAZIDE 12.5 MG: 12.5 TABLET ORAL at 08:35

## 2021-08-25 RX ADMIN — CHLORHEXIDINE GLUCONATE 15 ML: 1.2 SOLUTION ORAL at 08:32

## 2021-08-25 RX ADMIN — ENOXAPARIN SODIUM 40 MG: 40 INJECTION SUBCUTANEOUS at 08:30

## 2021-08-25 RX ADMIN — LEVETIRACETAM 750 MG: 750 TABLET ORAL at 08:31

## 2021-08-25 RX ADMIN — CLINDAMYCIN HYDROCHLORIDE 300 MG: 150 CAPSULE ORAL at 08:31

## 2021-08-25 RX ADMIN — OXCARBAZEPINE 900 MG: 300 TABLET, FILM COATED ORAL at 08:35

## 2021-08-25 RX ADMIN — LEVETIRACETAM 750 MG: 750 TABLET ORAL at 10:25

## 2021-08-25 RX ADMIN — LISINOPRIL 40 MG: 20 TABLET ORAL at 08:35

## 2021-08-25 RX ADMIN — NAPROXEN 500 MG: 500 TABLET ORAL at 08:31

## 2021-08-25 NOTE — UTILIZATION REVIEW
Initial Clinical Review    Admission: Date/Time/Statement:   Admission Orders (From admission, onward)     Ordered        08/24/21 0823  Inpatient Admission  Once                   Orders Placed This Encounter   Procedures    Inpatient Admission     Standing Status:   Standing     Number of Occurrences:   1     Order Specific Question:   Level of Care     Answer:   Level 2 Stepdown / HOT [14]     Order Specific Question:   Bed Type     Answer:   EMU [8]     Order Specific Question:   Estimated length of stay     Answer:   More than 2 Midnights     Order Specific Question:   Certification     Answer:   I certify that inpatient services are medically necessary for this patient for a duration of greater than two midnights  See H&P and MD Progress Notes for additional information about the patient's course of treatment  Initial Presentation:    39year old male presents for direct inpatient admission for evaluation and treatment of  intractable epilepsy  Patient continues to experience seizures on appropriate dosing of oxcarbazepine and levetiracetam   Dilantin ineffective and allergic to lamotrigine  Goal to capture and characterize events to guide further treatment of seizures  Plan   Spells/Seizures:   1) Will start continuous video EEG monitoring to capture and characterize events  Plan to localize seizures as part of a phase I presurgical evaluation  2) Will start single lead ECG monitoring   3) Medications:  Prior to admission, patient was taking levetiracetam 1500 mg BID and oxcarbazepine 900 mg in the morning and 1200 mg at night                - 8/24 Decrease levetiracetam to 750 mg BID   4) Additional diagnostic tests:  None at this time   Consider sleep deprivation tomorrow  5) Seizure/fall/status epilepticus precautions  6) Will order Ativan 2 mg as needed for more than two complex partial seizures or 1 Generalized tonic clonic seizure in a 24 hour period     7) Check labs with AED drug levels, CBC and CMP        Date: 8-25-21   Day 2: inpatient   Discharged  Admitted to the Epilepsy Monitoring Unit and monitored on continuous video EEG  Over the course of the admission, his dose of levetiracetam was decreased by 50% and home dose of oxcarbazepine was continued  Due to patient note being able to tolerate continued video EEG monitoring and admission he was given his home dose of levetiracetam and oxcarbazepine prior to being discharged home this morning  Video EEG     Start time: 8/24/2021: 09:29   End time: 8/25/2021:  09:51      This prolonged, continuous video-EEG recording captured one spell of reported premonition feeling, which was without EEG changes  The lack of EEG change does not fully exclude the possibility of a focal unaware seizure  Intermittent left temporal delta activities suggest an underlying area of neuronal dysfunction      Arrival    08/24/21 1327 08/24/21 1327 08/24/21 1327 08/24/21 1327 08/24/21 1327   98 4 °F (36 9 °C) 74 16 127/82 97 %      Oral          No Pain          08/24/21 107 kg (236 lb)     Additional Vital Signs:       Date/Time  Temp  Pulse  Resp  BP  MAP   SpO2  O2 Device   08/25/21 06:59:39  97 4 °F (36 3 °C)  Abnormal   73  --  168/95  119  96 %  --   08/25/21 0100  --  --  --  158/92  --  --  --   08/24/21 2300  --  --  --  170/85  --  --  --   08/24/21 22:43:34  98 1 °F (36 7 °C)  78  --  179/127  Abnormal   144  93 %  --   08/24/21 19:26:32  98 1 °F (36 7 °C)  68  --  172/117  Abnormal   135  98 %  --   08/24/21 15:45:23  97 9 °F (36 6 °C)  79  18  127/86  100  96 %  None (Room air)           Pertinent Labs/Diagnostic Test Results:       Results from last 7 days   Lab Units 08/24/21  1244   WBC Thousand/uL 7 53   HEMOGLOBIN g/dL 15 0   HEMATOCRIT % 45 8   PLATELETS Thousands/uL 122*   NEUTROS ABS Thousands/µL 3 91         Results from last 7 days   Lab Units 08/24/21  1244   SODIUM mmol/L 142   POTASSIUM mmol/L 3 3*   CHLORIDE mmol/L 111*   CO2 mmol/L 28   ANION GAP mmol/L 3*   BUN mg/dL 17   CREATININE mg/dL 1 03   EGFR ml/min/1 73sq m 102   CALCIUM mg/dL 8 4     Results from last 7 days   Lab Units 08/24/21  1244   AST U/L 21   ALT U/L 25   ALK PHOS U/L 77   TOTAL PROTEIN g/dL 7 3   ALBUMIN g/dL 3 8   TOTAL BILIRUBIN mg/dL 0 80     Results from last 7 days   Lab Units 08/25/21  0605   POC GLUCOSE mg/dl 86     Results from last 7 days   Lab Units 08/24/21  1244   GLUCOSE RANDOM mg/dL 92       ED Treatment:   Medication Administration - No Administrations Displayed (No Start Event Found)     None        Past Medical History:   Diagnosis Date    Diabetes mellitus ([place] [address] )     Hypertension     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, but path consistent with thrombotic fibrotic mass, marantic endocarditis    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    Seizure ([place] [address] )     possible, unclear history    TIA (transient ischemic attack)     Possible     Present on Admission:   Essential hypertension   Type 2 diabetes mellitus with diabetic peripheral angiopathy without gangrene, without long-term current use of insulin ([facility])   Intractable epilepsy without status epilepticus ([place] [address] )      Admitting Diagnosis: Epilepsy ([place] [address] ) [G40 909]     Age/Sex: 39 y o  male    Scheduled Medications:  amLODIPine, 5 mg, Oral, Daily  atorvastatin, 20 mg, Oral, Daily  chlorhexidine, 15 mL, Mouth/Throat, BID  clindamycin, 300 mg, Oral, 4x Daily  enoxaparin, 40 mg, Subcutaneous, Daily  hydrochlorothiazide, 12 5 mg, Oral, Daily  levETIRAcetam, 750 mg, Oral, Q12H SHELLIE  lisinopril, 40 mg, Oral, Daily  naproxen, 500 mg, Oral, BID With Meals  OXcarbazepine, 1,200 mg, Oral, HS  OXcarbazepine, 900 mg, Oral, QAM  polyethylene glycol, 17 g, Oral, Daily  sitaGLIPtin, 100 mg, Oral, Daily      Continuous IV Infusions:     PRN Meds:  acetaminophen, 650 mg, Oral, Q6H PRN  diphenhydrAMINE, 25 mg, Oral, Q8H PRN  LORazepam, 2 mg, Intravenous, Q8H PRN  ondansetron, 4 mg, Intravenous, Q6H PRN        None    Network Utilization Review Department  ATTENTION: Please call with any questions or concerns to [phone] and carefully listen to the prompts so that you are directed to the right person  All voicemails are confidential   Claudell Patient all requests for admission clinical reviews, approved or denied determinations and any other requests to dedicated fax number below belonging to the campus where the patient is receiving treatment   List of dedicated fax numbers for the Facilities:  [address] DENIALS (Administrative/Medical Necessity) [phone]   [address] (Maternity/NICU/Pediatrics) [phone]   [address] [place] [address] [address] [place] [address] [id number] [place] [address] Devika Olson [address] [facility] [place] [address] [phone]

## 2021-08-25 NOTE — UTILIZATION REVIEW
Inpatient Admission Authorization Request   NOTIFICATION OF INPATIENT ADMISSION/INPATIENT AUTHORIZATION REQUEST   SERVICING FACILITY:   [facility]  Address: [address] [place] [address], [address] [place] [address]  Tax ID: [id number]  NPI: [id number]  Place of Service: Inpatient [address] [facility] Code: 24     ATTENDING PROVIDER:  Attending Name and NPI#: Master Hager Md [[id number]]  Address: [address] [place] [address], [address] [place] [address]  Phone: [phone]     UTILIZATION REVIEW CONTACT:  Radha Molina Utilization   Network Utilization Review Department  Phone: [phone]  Fax: [phone]  Email: [email or web address]     PHYSICIAN ADVISORY SERVICES:  FOR CYIP-QM-XQTA REVIEW - MEDICAL NECESSITY DENIAL  Phone: [phone]  Fax: [phone]  Email: [email or web address]     TYPE OF REQUEST:  Inpatient Status     ADMISSION INFORMATION:  ADMISSION DATE/TIME: 8/24/21  8:17 AM  PATIENT DIAGNOSIS CODE/DESCRIPTION:  Epilepsy ([facility] [address] ) [B58 931]  DISCHARGE DATE/TIME: No discharge date for patient encounter  DISCHARGE DISPOSITION (IF DISCHARGED): Left against medical advice or discontinued care     IMPORTANT INFORMATION:  Please contact the Radha Molina directly with any questions or concerns regarding this request  Department voicemails are confidential     Send requests for admission clinical reviews, concurrent reviews, approvals, and administrative denials due to lack of clinical to fax [phone]

## 2021-08-25 NOTE — DISCHARGE SUMMARY
Epilepsy Attending Discharge Summary  Ryley Fischer 39 y o  male   : 1975  MRN: [id number]     Unit/Bed#: [facility] 716-01    Encounter: [id number]    Date of Admission:   2021  Date of Discharge: 2021  Attending on Admission: Dr Naveen Syed  Attending on Discharge: Dr Diana Julio    Admission Diagnosis:    1  Intractable epilepsy without status epilepticus  Discharge Diagnosis:  1  Intractable epilepsy without status epilepticus    Secondary Diagnoses:  1  HTN  2  DM2  3  Stroke history    Consultations:  1  none    Procedures:  1  Continuous Video EEG  2  Single lead ECG monitoring    Disposition:  Discharge to Home    Follow-up Appointments/Referrals:  1  [facility]up with [facility] as scheduled after discharge with Dr Diana Julio    Medication Changes:  1  None at this time    Discharge Medications:  See after visit summary for reconciled discharge medications provided to patient and family  Recommended follow-up testin  None at this time    Brief History:  Per admission H&P:  Abiel Navarro a 39 y  o  right handed male with intractable epilepsy in the setting of DM2 , HTN, 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, insomnia, obesity, and possible CVA in 2016 (no evidence on MRI) who is admitted to the Epilepsy Monitoring Unit for evaluation of seizures  He is a patient of Dr Valeriano Kinney as an outpatient  Seen most recently in the office by Tammy Sanderson (Neurology Resident) and Dr Diana Julio  At that time, he had been event free since EMU admission at the end of  which was not tolerated by the patient and resulted in early discharge prior to events being captured       The patient reports that his seizures started in  when he was on his way home from work  He was walking and stepped on a manhole which electrocuted him  Afterwards he had difficulty talking and with balance   His family took him to [facility] where it took a while to figure out what was going on  He was started on levetiracetam and his symptoms subsided  He was on levetiracetam for about 6 months before stopping it and was event free until a big seizure in 2014 out of sleep  Prior to that he was having episodes at work where coworkers would note he was blank, staring off and not responsive       The event in 2014 caused him to tear his left rotator cuff   He was started on phenytoin which was ineffective and transitioned to levetriacetam  While on levetiracetam he continued to have episodes of staring and possible GTC seizures  He reports that then in 2016 he had a stroke while he was aggravated with some emplyoees  He suddenly unable to move the right side of his face and upper/lower body  They took him to the ER and found a left frontal infarction on the MRI (per prior note from Dr Katrina Espitia on 1/22/2019)    Because of his prior history of a mitral valve mass versus thrombus that was removed in 2015, he was evaluated with an extensive evaluation including a transesophageal echocardiogram, which did not show any further evidence of cardiac mass      When seen by Dr Katrina Espitia for original consult, he had continued to have events from then on  He does meet criteria for medically refractory epilepsy and would benefit from pre-surgical evaluation as well as to better capture and characterize his events  Neuropsychological testing has been completed by Dr Sebastián Ramires (impression below)  He has been maintained on oxcarbazepine 900 mg in the morning and 1200 mg at night as well as levetiracetam 1500 mg BID  At prior visit, it was noted by Dr Katrina Espitia that following admission, and if seizures are confirmed, noted possibility of transitioning from oxcarbazepine to another medication such as zonisamide       The patient reports today that he continues to have "small ones" about every 3 months  Last seizure per patient was March or April and feels that he is due for a seizure   His "big ones" are much less frequent than in the past while on medication  Reports that his "small ones" may trigger his "big ones"       He does continue to report that he has been having daily visual and auditory hallucinations  There is no history of psychiatric diagnoses  He reports that he sees the number 7 everywhere  He sees things that have not happened yet and considers them as warnings of things to come (refers to them as premonitions)  He has seen a man in his house as well and has heard voices warning him  He is aware that these are not real  We reviewed to press the EMU button whenever he has any hallucinations or episodes concerning for seizures  He does report that these hallucinations may occur prior to his "small ones "    Hospital Course:  Brook Becerra was admitted to the Epilepsy Monitoring Unit and monitored on continuous video EEG  Over the course of the admission, his dose of levetiracetam was decreased by 50% and home dose of oxcarbazepine was continued  Due to patient note being able to tolerate continued video EEG monitoring and admission he was given his home dose of levetiracetam and oxcarbazepine prior to being discharged home this morning  Activation procedures:  1  Medication tapering      EMU CONCLUSION  Summary interictal findings: Intermittent left temporal slowing    Summary event findings: Event of premonition was without EEG changes    Seizure Classification: N/A    Epilepsy Classification: localization related epilepsy    EMU findings and discussion:  Patient was unable to tolerate prolonged EMU admission  He was able to be monitored for about 24 hours  While he did have one of his "premonitions," there was no EEG change associated with this event  It is possible that this event represents a focal onset seizure as at times these events are not appreciated on scalp EEG   We discussed continuing EMU admission to capture larger events that may be appreciated on scalp EEG but after long discussion with patient and wife, the patient is not agreeable to stay for further monitoring  His levetiracetam dose was decreased by 50 % on admission so he was given his full dose this morning and home dose of oxcarbazepine prior to discharge home  He will follow up with Dr Leonora Alvarez as scheduled in the office  Diet:  Regular    Activity:  as tolerated    Restrictions:  No driving    Discharge Statement   I spent 40 minutes discharging the patient  This time was spent on the day of discharge  More than 50% of the time was spent counseling/coordinating care  I had direct contact with the patient on the day of discharge   Time was spent specifically discussing further care    AR Bishop   Date: 8/25/2021

## 2021-08-25 NOTE — PROGRESS NOTES
Refusing to ring call bell for assistance when getting out of bed and will not allow staff to put the alarm on  Dr Beatriz Santana and eeg techs notified

## 2021-08-26 ENCOUNTER — TELEPHONE (OUTPATIENT)
Dept: INTERNAL MEDICINE CLINIC | Facility: CLINIC | Age: 46
End: 2021-08-26

## 2021-08-26 DIAGNOSIS — E11.51 TYPE 2 DIABETES MELLITUS WITH DIABETIC PERIPHERAL ANGIOPATHY WITHOUT GANGRENE, WITHOUT LONG-TERM CURRENT USE OF INSULIN (HCC): ICD-10-CM

## 2021-08-26 RX ORDER — ATORVASTATIN CALCIUM 20 MG/1
TABLET, FILM COATED ORAL
Qty: 90 TABLET | Refills: 1 | Status: SHIPPED | OUTPATIENT
Start: 2021-08-26 | End: 2022-02-03 | Stop reason: SDUPTHER

## 2021-08-26 NOTE — TELEPHONE ENCOUNTER
Pt c/o toothache  Has dental appt for extraction on 9/14    Pt asking for pain med    SLB 8/25-Intractable epilepsy   Appt scheduled

## 2021-08-26 NOTE — TELEPHONE ENCOUNTER
This has to be done by a dentist  Our office does not prescribe opiates for toothache  He can take OTC pain meds

## 2021-08-26 NOTE — UTILIZATION REVIEW
Notification of Discharge   This is a Notification of Discharge from our facility [address]  Please be advised that this patient has been discharge from our facility  Below you will find the admission and discharge date and time including the patients disposition  UTILIZATION REVIEW CONTACT:  Sasha Hong  Utilization   Network Utilization Review Department  Phone: [phone] x carefully listen to the prompts  All voicemails are confidential   Email: [email or web address]     PHYSICIAN ADVISORY SERVICES:  FOR [id number] REVIEW - MEDICAL NECESSITY DENIAL  Phone: [phone]  Fax: [phone]  Email: [email or web address]     PRESENTATION DATE: 8/24/2021  8:17 AM  OBERVATION ADMISSION DATE:   INPATIENT ADMISSION DATE: 8/24/21  8:17 AM   DISCHARGE DATE: 8/25/2021 10:30 AM  DISPOSITION: Home/Self Care Home/Self Care      IMPORTANT INFORMATION:  Send all requests for admission clinical reviews, approved or denied determinations and any other requests to dedicated fax number below belonging to the campus where the patient is receiving treatment   List of dedicated fax numbers:  [address] DENIALS (Administrative/Medical Necessity) [phone]   [address] (Maternity/NICU/Pediatrics) [phone]   Ariana Fernández [phone]   Rel[facility] [phone]   Cassi Orozco [phone]   Vikki Soares Skinny Demetris [address] [facility] [phone]   [facility]  [phone]   [address] [facility] [address] [facility] [address] [facility] [phone]

## 2021-08-27 LAB
LEVETIRACETAM SERPL-MCNC: 23.4 UG/ML (ref 10–40)
OXCARBAZEPINE SERPL-MCNC: 29 UG/ML (ref 10–35)

## 2021-08-27 NOTE — UTILIZATION REVIEW
2ND REQUEST FOR AUTHORIZATION        Inpatient Admission Authorization Request   NOTIFICATION OF INPATIENT ADMISSION/INPATIENT AUTHORIZATION REQUEST   SERVICING FACILITY:   [facility]  Address: [address] [place] [address], [address] [place] [address]  Tax ID: [id number]  NPI: [id number]  Place of Service: Inpatient [address] [facility] Code: 24     ATTENDING PROVIDER:  Attending Name and NPI#: Janay Ashton Md [[id number]]  Address: [address] [place] [address], [address] [place] [address]  Phone: [phone]     UTILIZATION REVIEW CONTACT:  Jocelynn Oliveros Utilization   Network Utilization Review Department  Phone: [phone]  Fax: [phone]  Email: [email or web address]     PHYSICIAN ADVISORY SERVICES:  FOR AEZG-SA-ZYNY REVIEW - MEDICAL NECESSITY DENIAL  Phone: [phone]  Fax: [phone]  Email: [email or web address]     TYPE OF REQUEST:  Inpatient Status     ADMISSION INFORMATION:  ADMISSION DATE/TIME: 8/24/21  8:17 AM  PATIENT DIAGNOSIS CODE/DESCRIPTION:  Epilepsy ([facility] [address] ) [K20 619]  DISCHARGE DATE/TIME: 8/25/2021 10:30 AM  DISCHARGE DISPOSITION (IF DISCHARGED): Home/Self Care     IMPORTANT INFORMATION:  Please contact the Jocelynn Oliveros directly with any questions or concerns regarding this request  Department voicemails are confidential     Send requests for admission clinical reviews, concurrent reviews, approvals, and administrative denials due to lack of clinical to fax [phone]             Initial Clinical Review    Admission: Date/Time/Statement:   Admission Orders (From admission, onward)     Ordered        08/24/21 0823  Inpatient Admission  Once                   Orders Placed This Encounter   Procedures    Inpatient Admission     Standing Status:   Standing     Number of Occurrences:   1     Order Specific Question:   Level of Care     Answer:   Level 2 Stepdown / HOT [14]     Order Specific Question:   Bed Type     Answer:   EMU [8]     Order Specific Question:   Estimated length of stay     Answer:   More than 2 Midnights     Order Specific Question:   Certification     Answer:   I certify that inpatient services are medically necessary for this patient for a duration of greater than two midnights  See H&P and MD Progress Notes for additional information about the patient's course of treatment  Initial Presentation:    39year old male presents for direct inpatient admission for evaluation and treatment of  intractable epilepsy  Patient continues to experience seizures on appropriate dosing of oxcarbazepine and levetiracetam   Dilantin ineffective and allergic to lamotrigine  Goal to capture and characterize events to guide further treatment of seizures  Plan   Spells/Seizures:   1) Will start continuous video EEG monitoring to capture and characterize events  Plan to localize seizures as part of a phase I presurgical evaluation  2) Will start single lead ECG monitoring   3) Medications:  Prior to admission, patient was taking levetiracetam 1500 mg BID and oxcarbazepine 900 mg in the morning and 1200 mg at night                - 8/24 Decrease levetiracetam to 750 mg BID   4) Additional diagnostic tests:  None at this time   Consider sleep deprivation tomorrow  5) Seizure/fall/status epilepticus precautions  6) Will order Ativan 2 mg as needed for more than two complex partial seizures or 1 Generalized tonic clonic seizure in a 24 hour period  7) Check labs with AED drug levels, CBC and CMP        Date: 8-25-21   Day 2: inpatient   Discharged  Admitted to the Epilepsy Monitoring Unit and monitored on continuous video EEG  Over the course of the admission, his dose of levetiracetam was decreased by 50% and home dose of oxcarbazepine was continued  Due to patient note being able to tolerate continued video EEG monitoring and admission he was given his home dose of levetiracetam and oxcarbazepine prior to being discharged home this morning  Video EEG     Start time: 8/24/2021: 09:29   End time: 8/25/2021:  09:51      This prolonged, continuous video-EEG recording captured one spell of reported premonition feeling, which was without EEG changes  The lack of EEG change does not fully exclude the possibility of a focal unaware seizure  Intermittent left temporal delta activities suggest an underlying area of neuronal dysfunction      Arrival    08/24/21 1327 08/24/21 1327 08/24/21 1327 08/24/21 1327 08/24/21 1327   98 4 °F (36 9 °C) 74 16 127/82 97 %      Oral          No Pain          08/24/21 107 kg (236 lb)     Additional Vital Signs:       Date/Time  Temp  Pulse  Resp  BP  MAP   SpO2  O2 Device   08/25/21 06:59:39  97 4 °F (36 3 °C)  Abnormal   73  --  168/95  119  96 %  --   08/25/21 0100  --  --  --  158/92  --  --  --   08/24/21 2300  --  --  --  170/85  --  --  --   08/24/21 22:43:34  98 1 °F (36 7 °C)  78  --  179/127  Abnormal   144  93 %  --   08/24/21 19:26:32  98 1 °F (36 7 °C)  68  --  172/117  Abnormal   135  98 %  --   08/24/21 15:45:23  97 9 °F (36 6 °C)  79  18  127/86  100  96 %  None (Room air)           Pertinent Labs/Diagnostic Test Results:       Results from last 7 days   Lab Units 08/24/21  1244   WBC Thousand/uL 7 53   HEMOGLOBIN g/dL 15 0   HEMATOCRIT % 45 8   PLATELETS Thousands/uL 122*   NEUTROS ABS Thousands/µL 3 91         Results from last 7 days   Lab Units 08/24/21  1244   SODIUM mmol/L 142   POTASSIUM mmol/L 3 3*   CHLORIDE mmol/L 111*   CO2 mmol/L 28   ANION GAP mmol/L 3*   BUN mg/dL 17   CREATININE mg/dL 1 03   EGFR ml/min/1 73sq m 102   CALCIUM mg/dL 8 4     Results from last 7 days   Lab Units 08/24/21  1244   AST U/L 21   ALT U/L 25   ALK PHOS U/L 77   TOTAL PROTEIN g/dL 7 3   ALBUMIN g/dL 3 8   TOTAL BILIRUBIN mg/dL 0 80     Results from last 7 days   Lab Units 08/25/21  0605   POC GLUCOSE mg/dl 86     Results from last 7 days   Lab Units 08/24/21  1244   GLUCOSE RANDOM mg/dL 92       ED Treatment: Medication Administration - No Administrations Displayed (No Start Event Found)     None        Past Medical History:   Diagnosis Date    Diabetes mellitus ([place] [address] )     Hypertension     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, but path consistent with thrombotic fibrotic mass, marantic endocarditis    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    Seizure ([place] [address] )     possible, unclear history    TIA (transient ischemic attack)     Possible     Present on Admission:   Essential hypertension   Type 2 diabetes mellitus with diabetic peripheral angiopathy without gangrene, without long-term current use of insulin ([facility])   Intractable epilepsy without status epilepticus ([place] [address] )      Admitting Diagnosis: Epilepsy ([place] [address] ) [G40 909]     Age/Sex: 39 y o  male    Scheduled Medications:  No current facility-administered medications for this encounter  Continuous IV Infusions:  No current facility-administered medications for this encounter  PRN Meds:  No current facility-administered medications for this encounter  None    Network Utilization Review Department  ATTENTION: Please call with any questions or concerns to [phone] and carefully listen to the prompts so that you are directed to the right person  All voicemails are confidential   Bigg Fetch all requests for admission clinical reviews, approved or denied determinations and any other requests to dedicated fax number below belonging to the campus where the patient is receiving treatment   List of dedicated fax numbers for the Facilities:  [address] DENIALS (Administrative/Medical Necessity) [phone]   [address] (Maternity/NICU/Pediatrics) [phone] [address] 40 [address] [facility] [address] Sonya Chamberlain 1277 [phone] 34[address] [facility] [place] [address] Devika Olson 1481 [address] [facility] [phone]     Notification of Discharge   This is a Notification of Discharge from our facility [address]  Please be advised that this patient has been discharge from our facility  Below you will find the admission and discharge date and time including the patients disposition  UTILIZATION REVIEW CONTACT:  Kelli Gottlieb  Utilization   Network Utilization Review Department  Phone: [phone] x carefully listen to the prompts  All voicemails are confidential   Email: [email or web address]     PHYSICIAN ADVISORY SERVICES:  FOR SCOS-ZH-LMHT REVIEW - MEDICAL NECESSITY DENIAL  Phone: [phone]  Fax: [phone]  Email: [email or web address]     PRESENTATION DATE: 8/24/2021  8:17 AM  OBERVATION ADMISSION DATE:   INPATIENT ADMISSION DATE: 8/24/21  8:17 AM   DISCHARGE DATE: 8/25/2021 10:30 AM  DISPOSITION: Home/Self Care Home/Self Care      IMPORTANT INFORMATION:  Send all requests for admission clinical reviews, approved or denied determinations and any other requests to dedicated fax number below belonging to the campus where the patient is receiving treatment   List of dedicated fax numbers:  FACILITY NAME UR FAX NUMBER   ADMISSION DENIALS (Administrative/Medical Necessity) [phone]   [facility] (Maternity/NICU/Pediatrics) [phone]   Gilberto Dorado [phone]   Rodrigo Ontiveros [address] [facility] [address] 128 Bennie Escalante [place] [phone]   [facility] [phone]   [address] [place] [address] [facility] [facility] [phone]

## 2021-09-01 ENCOUNTER — TELEPHONE (OUTPATIENT)
Dept: NEUROLOGY | Facility: CLINIC | Age: 46
End: 2021-09-01

## 2021-09-01 DIAGNOSIS — G40.919 INTRACTABLE EPILEPSY WITHOUT STATUS EPILEPTICUS, UNSPECIFIED EPILEPSY TYPE (HCC): Primary | ICD-10-CM

## 2021-09-01 NOTE — TELEPHONE ENCOUNTER
Called and discussed with patient  Said that he has been checking his BP and it has been within his normal range  Last checked today 143/99, yesterday 131/91, and 140/94  He does think that he did have a seizure because he has had this symptom before after a seizure  He is not aware of having a seizure so thinks that he may have had it while he was sleeping

## 2021-09-01 NOTE — TELEPHONE ENCOUNTER
Patient calling because he is getting calls from [facility] for a wellness check  Said that he missed the calls  Unsure why they are calling  Susan Traore that he is still dizzy  Dizziness started as he was getting discharged  Susan Traore that it is tolerable but if it gets worse he will go to the ED  Unsure if he had a seizure when getting discharged  Said that the symptoms got worse when he got home and have continued  States he has had this dizziness in the past after having a seizure  Asking if you have any recommendations  Called [facility] and spoke with Louis Olivier but was told that they were returning his call  Nothing further needed with [facility]

## 2021-09-01 NOTE — TELEPHONE ENCOUNTER
I wasn't aware that he was having dizziness  When I rounded on him in the morning, he was okay and mainly just wanted to go home  Does he recall if he had a seizure? We hadn't changed anything with his medications at discharge and he only got one dose of a lower amount of Levetiracetam     He has been having trouble with elevated BP   It may be best to start with checking BP at home and/or seeing his PCP to reassess his dizziness first

## 2021-09-02 RX ORDER — ZONISAMIDE 100 MG/1
CAPSULE ORAL
Qty: 90 CAPSULE | Refills: 11 | Status: SHIPPED | OUTPATIENT
Start: 2021-09-02 | End: 2021-11-16

## 2021-09-02 NOTE — TELEPHONE ENCOUNTER
He is already on maximal doses of his current medications  we weren't able to capture his events, but I am concerned they are seizures, so I would recommend that we start a different medication  I would recommend we start Zonisamide, and increase as follows: take 100 mg nightly for 1 week, then 200 mg nightly for 1 week, then take 300 mg nightly  This medication can potentially make people sleepy, so we typically have folks take it at night before going to bed  It also can potentially decrease appetite and rarely can contribute to kidney stones  Is important that he stays well hydrated while taking this medication  I will send a new prescription to his pharmacy for this medication

## 2021-09-03 NOTE — UTILIZATION REVIEW
3RD REQUEST FOR INPATIENT AUTHORIZATION        Inpatient Admission Authorization Request   NOTIFICATION OF INPATIENT ADMISSION/INPATIENT AUTHORIZATION REQUEST   SERVICING FACILITY:   [facility]  Address: [address] [place] [address], [address] [place] [address]  Tax ID: [id number]  NPI: [id number]  Place of Service: Inpatient [address] [facility] Code: 24     ATTENDING PROVIDER:  Attending Name and NPI#: Vicente Stock Md [[id number]]  Address: [address] [place] [address], [address] [place] [address]  Phone: [phone]     UTILIZATION REVIEW CONTACT:  Alton Haynes Utilization   Network Utilization Review Department  Phone: [phone]  Fax: [phone]  Email: [email or web address]     PHYSICIAN ADVISORY SERVICES:  FOR CFEH-GP-FUOK REVIEW - MEDICAL NECESSITY DENIAL  Phone: [phone]  Fax: [phone]  Email: [email or web address]     TYPE OF REQUEST:  Inpatient Status     ADMISSION INFORMATION:  ADMISSION DATE/TIME: 8/24/21  8:17 AM  PATIENT DIAGNOSIS CODE/DESCRIPTION:  Epilepsy ([facility] [address] ) [B01 284]  DISCHARGE DATE/TIME: 8/25/2021 10:30 AM  DISCHARGE DISPOSITION (IF DISCHARGED): Home/Self Care     IMPORTANT INFORMATION:  Please contact the Alton Haynes directly with any questions or concerns regarding this request  Department voicemails are confidential     Send requests for admission clinical reviews, concurrent reviews, approvals, and administrative denials due to lack of clinical to fax [phone]             Initial Clinical Review    Admission: Date/Time/Statement:   Admission Orders (From admission, onward)     Ordered        08/24/21 0823  Inpatient Admission  Once                   Orders Placed This Encounter   Procedures    Inpatient Admission     Standing Status:   Standing     Number of Occurrences:   1     Order Specific Question:   Level of Care     Answer:   Level 2 Stepdown / HOT [14]     Order Specific Question:   Bed Type     Answer:   EMU [8]     Order Specific Question:   Estimated length of stay     Answer:   More than 2 Midnights     Order Specific Question:   Certification     Answer:   I certify that inpatient services are medically necessary for this patient for a duration of greater than two midnights  See H&P and MD Progress Notes for additional information about the patient's course of treatment  Initial Presentation:    39year old male presents for direct inpatient admission for evaluation and treatment of  intractable epilepsy  Patient continues to experience seizures on appropriate dosing of oxcarbazepine and levetiracetam   Dilantin ineffective and allergic to lamotrigine  Goal to capture and characterize events to guide further treatment of seizures  Plan   Spells/Seizures:   1) Will start continuous video EEG monitoring to capture and characterize events  Plan to localize seizures as part of a phase I presurgical evaluation  2) Will start single lead ECG monitoring   3) Medications:  Prior to admission, patient was taking levetiracetam 1500 mg BID and oxcarbazepine 900 mg in the morning and 1200 mg at night                - 8/24 Decrease levetiracetam to 750 mg BID   4) Additional diagnostic tests:  None at this time   Consider sleep deprivation tomorrow  5) Seizure/fall/status epilepticus precautions  6) Will order Ativan 2 mg as needed for more than two complex partial seizures or 1 Generalized tonic clonic seizure in a 24 hour period  7) Check labs with AED drug levels, CBC and CMP        Date: 8-25-21   Day 2: inpatient   Discharged  Admitted to the Epilepsy Monitoring Unit and monitored on continuous video EEG  Over the course of the admission, his dose of levetiracetam was decreased by 50% and home dose of oxcarbazepine was continued   Due to patient note being able to tolerate continued video EEG monitoring and admission he was given his home dose of levetiracetam and oxcarbazepine prior to being discharged home this morning  Video EEG     Start time: 8/24/2021: 09:29   End time: 8/25/2021:  09:51      This prolonged, continuous video-EEG recording captured one spell of reported premonition feeling, which was without EEG changes  The lack of EEG change does not fully exclude the possibility of a focal unaware seizure  Intermittent left temporal delta activities suggest an underlying area of neuronal dysfunction      Arrival    08/24/21 1327 08/24/21 1327 08/24/21 1327 08/24/21 1327 08/24/21 1327   98 4 °F (36 9 °C) 74 16 127/82 97 %      Oral          No Pain          08/24/21 107 kg (236 lb)     Additional Vital Signs:       Date/Time  Temp  Pulse  Resp  BP  MAP   SpO2  O2 Device   08/25/21 06:59:39  97 4 °F (36 3 °C)  Abnormal   73  --  168/95  119  96 %  --   08/25/21 0100  --  --  --  158/92  --  --  --   08/24/21 2300  --  --  --  170/85  --  --  --   08/24/21 22:43:34  98 1 °F (36 7 °C)  78  --  179/127  Abnormal   144  93 %  --   08/24/21 19:26:32  98 1 °F (36 7 °C)  68  --  172/117  Abnormal   135  98 %  --   08/24/21 15:45:23  97 9 °F (36 6 °C)  79  18  127/86  100  96 %  None (Room air)           Pertinent Labs/Diagnostic Test Results:                                 ED Treatment:   Medication Administration - No Administrations Displayed (No Start Event Found)     None        Past Medical History:   Diagnosis Date    Diabetes mellitus ([facility] )     Hypertension     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, but path consistent with thrombotic fibrotic mass, marantic endocarditis    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    Seizure ([place] [address] )     possible, unclear history    TIA (transient ischemic attack)     Possible     Present on Admission:   Essential hypertension   Type 2 diabetes mellitus with diabetic peripheral angiopathy without gangrene, without long-term current use of insulin (HCC)   Intractable epilepsy without status epilepticus ([place] [address] )      Admitting Diagnosis: Epilepsy ([place] [address] ) [C12 984] Age/Sex: 42 y o  male    Scheduled Medications:  No current facility-administered medications for this encounter  Continuous IV Infusions:  No current facility-administered medications for this encounter  PRN Meds:  No current facility-administered medications for this encounter  None    Network Utilization Review Department  ATTENTION: Please call with any questions or concerns to [phone] and carefully listen to the prompts so that you are directed to the right person  All voicemails are confidential   Jayne Dull all requests for admission clinical reviews, approved or denied determinations and any other requests to dedicated fax number below belonging to the campus where the patient is receiving treatment  List of dedicated fax numbers for the Facilities:  [address] DENIALS (Administrative/Medical Necessity) [phone]   [address] (Maternity/NICU/Pediatrics) [phone]   [address] [place] [address] [address] [address] [place] [address] Devika Dior Graeme 1481 [address] [facility] 951 [phone]     Notification of Discharge   This is a Notification of Discharge from our facility [address]  Please be advised that this patient has been discharge from our facility  Below you will find the admission and discharge date and time including the patients disposition     UTILIZATION REVIEW CONTACT:  Moraima Muse  Utilization   Network Utilization Review Department  Phone: [phone] x carefully listen to the prompts  All voicemails are confidential   Email: [email or web address]     PHYSICIAN ADVISORY SERVICES:  FOR QHFK-RO-TBDZ REVIEW - MEDICAL NECESSITY DENIAL  Phone: [phone]  Fax: [phone]  Email: [email or web address]     PRESENTATION DATE: 8/24/2021  8:17 AM  OBERVATION ADMISSION DATE:   INPATIENT ADMISSION DATE: 8/24/21  8:17 AM   DISCHARGE DATE: 8/25/2021 10:30 AM  DISPOSITION: Home/Self Care Home/Self Care      IMPORTANT INFORMATION:  Send all requests for admission clinical reviews, approved or denied determinations and any other requests to dedicated fax number below belonging to the campus where the patient is receiving treatment   List of dedicated fax numbers:  [address] DENIALS (Administrative/Medical Necessity) [phone]   [address]  (Maternity/NICU/Pediatrics) [phone]   Win Thompson [phone]   Kelly Julio [phone]   Rosa Ardon [phone]   [address] [phone]   [facility]  [phone]   [address] [facility]  2401 [facility] [address] [facility] [phone]

## 2021-09-03 NOTE — TELEPHONE ENCOUNTER
ERINN    Called and discussed with patient  Said that he would like to keep his medications as is for now  Said that his seizures are getting less frequent and less severe  Feels like his current regime is working well  Said that if he would have another "big" seizure he will start another medication

## 2021-09-18 DIAGNOSIS — I10 ESSENTIAL HYPERTENSION: Chronic | ICD-10-CM

## 2021-09-18 RX ORDER — HYDROCHLOROTHIAZIDE 12.5 MG/1
TABLET ORAL
Qty: 30 TABLET | Refills: 0 | Status: SHIPPED | OUTPATIENT
Start: 2021-09-18 | End: 2021-09-19

## 2021-09-19 RX ORDER — HYDROCHLOROTHIAZIDE 12.5 MG/1
12.5 TABLET ORAL DAILY
Qty: 90 TABLET | Refills: 3 | Status: SHIPPED | OUTPATIENT
Start: 2021-09-19 | End: 2021-10-15

## 2021-09-21 DIAGNOSIS — E11.51 TYPE 2 DIABETES MELLITUS WITH DIABETIC PERIPHERAL ANGIOPATHY WITHOUT GANGRENE, WITHOUT LONG-TERM CURRENT USE OF INSULIN (HCC): Chronic | ICD-10-CM

## 2021-09-21 RX ORDER — BLOOD SUGAR DIAGNOSTIC
STRIP MISCELLANEOUS
Qty: 50 STRIP | Refills: 7 | Status: SHIPPED | OUTPATIENT
Start: 2021-09-21 | End: 2022-05-28

## 2021-10-16 DIAGNOSIS — E11.51 TYPE 2 DIABETES MELLITUS WITH DIABETIC PERIPHERAL ANGIOPATHY WITHOUT GANGRENE, WITHOUT LONG-TERM CURRENT USE OF INSULIN (HCC): Chronic | ICD-10-CM

## 2021-10-16 RX ORDER — SITAGLIPTIN 100 MG/1
TABLET, FILM COATED ORAL
Qty: 30 TABLET | Refills: 1 | Status: SHIPPED | OUTPATIENT
Start: 2021-10-16 | End: 2021-12-17

## 2021-11-10 ENCOUNTER — TELEPHONE (OUTPATIENT)
Dept: NEUROLOGY | Facility: CLINIC | Age: 46
End: 2021-11-10

## 2021-11-16 ENCOUNTER — OFFICE VISIT (OUTPATIENT)
Dept: NEUROLOGY | Facility: CLINIC | Age: 46
End: 2021-11-16
Payer: COMMERCIAL

## 2021-11-16 VITALS
SYSTOLIC BLOOD PRESSURE: 155 MMHG | BODY MASS INDEX: 31.78 KG/M2 | WEIGHT: 227 LBS | DIASTOLIC BLOOD PRESSURE: 95 MMHG | HEIGHT: 71 IN

## 2021-11-16 DIAGNOSIS — G40.919 INTRACTABLE EPILEPSY WITHOUT STATUS EPILEPTICUS, UNSPECIFIED EPILEPSY TYPE (HCC): Primary | ICD-10-CM

## 2021-11-16 PROCEDURE — 3077F SYST BP >= 140 MM HG: CPT | Performed by: PSYCHIATRY & NEUROLOGY

## 2021-11-16 PROCEDURE — 3080F DIAST BP >= 90 MM HG: CPT | Performed by: PSYCHIATRY & NEUROLOGY

## 2021-11-16 PROCEDURE — 99214 OFFICE O/P EST MOD 30 MIN: CPT | Performed by: PSYCHIATRY & NEUROLOGY

## 2021-11-16 RX ORDER — DIVALPROEX SODIUM 500 MG/1
TABLET, DELAYED RELEASE ORAL
Qty: 60 TABLET | Refills: 5 | Status: SHIPPED | OUTPATIENT
Start: 2021-11-16 | End: 2022-02-03

## 2021-11-18 DIAGNOSIS — I10 ESSENTIAL HYPERTENSION: ICD-10-CM

## 2021-11-18 RX ORDER — LISINOPRIL 40 MG/1
TABLET ORAL
Qty: 90 TABLET | Refills: 1 | Status: SHIPPED | OUTPATIENT
Start: 2021-11-18 | End: 2022-05-14

## 2021-11-20 DIAGNOSIS — I10 ESSENTIAL HYPERTENSION: ICD-10-CM

## 2021-11-20 RX ORDER — AMLODIPINE BESYLATE 5 MG/1
TABLET ORAL
Qty: 90 TABLET | Refills: 1 | Status: SHIPPED | OUTPATIENT
Start: 2021-11-20 | End: 2022-05-14

## 2021-11-24 DIAGNOSIS — K02.9 DENTAL CARIES: ICD-10-CM

## 2021-11-24 RX ORDER — NAPROXEN 500 MG/1
TABLET ORAL
Qty: 60 TABLET | Refills: 3 | Status: SHIPPED | OUTPATIENT
Start: 2021-11-24

## 2021-12-17 DIAGNOSIS — E11.51 TYPE 2 DIABETES MELLITUS WITH DIABETIC PERIPHERAL ANGIOPATHY WITHOUT GANGRENE, WITHOUT LONG-TERM CURRENT USE OF INSULIN (HCC): Chronic | ICD-10-CM

## 2021-12-17 RX ORDER — SITAGLIPTIN 100 MG/1
TABLET, FILM COATED ORAL
Qty: 30 TABLET | Refills: 1 | Status: SHIPPED | OUTPATIENT
Start: 2021-12-17 | End: 2022-02-03 | Stop reason: SDUPTHER

## 2021-12-28 ENCOUNTER — VBI (OUTPATIENT)
Dept: ADMINISTRATIVE | Facility: OTHER | Age: 46
End: 2021-12-28

## 2022-01-25 ENCOUNTER — OFFICE VISIT (OUTPATIENT)
Dept: NEUROLOGY | Facility: CLINIC | Age: 47
End: 2022-01-25
Payer: COMMERCIAL

## 2022-01-25 ENCOUNTER — APPOINTMENT (OUTPATIENT)
Dept: LAB | Facility: CLINIC | Age: 47
End: 2022-01-25
Payer: COMMERCIAL

## 2022-01-25 VITALS — WEIGHT: 216 LBS | BODY MASS INDEX: 30.24 KG/M2 | HEIGHT: 71 IN

## 2022-01-25 DIAGNOSIS — G40.919 INTRACTABLE EPILEPSY WITHOUT STATUS EPILEPTICUS, UNSPECIFIED EPILEPSY TYPE (HCC): ICD-10-CM

## 2022-01-25 DIAGNOSIS — G40.919 INTRACTABLE EPILEPSY WITHOUT STATUS EPILEPTICUS, UNSPECIFIED EPILEPSY TYPE (HCC): Primary | ICD-10-CM

## 2022-01-25 LAB
ALBUMIN SERPL BCP-MCNC: 3.9 G/DL (ref 3.5–5)
ALP SERPL-CCNC: 55 U/L (ref 46–116)
ALT SERPL W P-5'-P-CCNC: 24 U/L (ref 12–78)
ANION GAP SERPL CALCULATED.3IONS-SCNC: 5 MMOL/L (ref 4–13)
AST SERPL W P-5'-P-CCNC: 13 U/L (ref 5–45)
BILIRUB SERPL-MCNC: 0.53 MG/DL (ref 0.2–1)
BUN SERPL-MCNC: 12 MG/DL (ref 5–25)
CALCIUM SERPL-MCNC: 9.3 MG/DL (ref 8.3–10.1)
CHLORIDE SERPL-SCNC: 107 MMOL/L (ref 100–108)
CO2 SERPL-SCNC: 30 MMOL/L (ref 21–32)
CREAT SERPL-MCNC: 1.14 MG/DL (ref 0.6–1.3)
ERYTHROCYTE [DISTWIDTH] IN BLOOD BY AUTOMATED COUNT: 13.5 % (ref 11.6–15.1)
GFR SERPL CREATININE-BSD FRML MDRD: 77 ML/MIN/1.73SQ M
GLUCOSE SERPL-MCNC: 99 MG/DL (ref 65–140)
HCT VFR BLD AUTO: 47.6 % (ref 36.5–49.3)
HGB BLD-MCNC: 15.2 G/DL (ref 12–17)
MCH RBC QN AUTO: 28.4 PG (ref 26.8–34.3)
MCHC RBC AUTO-ENTMCNC: 31.9 G/DL (ref 31.4–37.4)
MCV RBC AUTO: 89 FL (ref 82–98)
PLATELET # BLD AUTO: 126 THOUSANDS/UL (ref 149–390)
PMV BLD AUTO: 13.1 FL (ref 8.9–12.7)
POTASSIUM SERPL-SCNC: 4.2 MMOL/L (ref 3.5–5.3)
PROT SERPL-MCNC: 7.4 G/DL (ref 6.4–8.2)
RBC # BLD AUTO: 5.35 MILLION/UL (ref 3.88–5.62)
SODIUM SERPL-SCNC: 142 MMOL/L (ref 136–145)
VALPROATE SERPL-MCNC: 67 UG/ML (ref 50–100)
WBC # BLD AUTO: 8.16 THOUSAND/UL (ref 4.31–10.16)

## 2022-01-25 PROCEDURE — 80165 DIPROPYLACETIC ACID FREE: CPT

## 2022-01-25 PROCEDURE — 80183 DRUG SCRN QUANT OXCARBAZEPIN: CPT

## 2022-01-25 PROCEDURE — 99215 OFFICE O/P EST HI 40 MIN: CPT | Performed by: NURSE PRACTITIONER

## 2022-01-25 PROCEDURE — 80177 DRUG SCRN QUAN LEVETIRACETAM: CPT

## 2022-01-25 PROCEDURE — 36415 COLL VENOUS BLD VENIPUNCTURE: CPT

## 2022-01-25 PROCEDURE — 85027 COMPLETE CBC AUTOMATED: CPT

## 2022-01-25 PROCEDURE — 80164 ASSAY DIPROPYLACETIC ACD TOT: CPT

## 2022-01-25 PROCEDURE — 80053 COMPREHEN METABOLIC PANEL: CPT

## 2022-01-25 NOTE — PROGRESS NOTES
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, who is returning to Neurology office for follow up of his seizures  Assessment/Plan:    Intractable epilepsy without status epilepticus [facility]  Patient with intractable epilepsy who continues to have events concerning for seizures  Currently on oxcarbazepine 900-1200, levetiracetam 1500 mg BID, and Depakote 500 mg BID  He has been having some issues with Depakote (nausea, vomiting, shaky)  Levetiracetam may be contributing to mood  Recommended that the patient have blood work done  Plan to likely transition to zonisamide or onfi and off of Depakote which was discussed with patient  Side effects reviewed and given information about both of these medications  Recommended 72 hour ambulatory EEG to capture events concerning for seizures  Reports daily premonitions  Prior attempts at [facility] admission were not tolerated by patient  Patient will call the office with continued events and concerns  Follow up in 3 months or sooner if needed with Dr Shayna Felix  He will Return for 2-3 months with Dr Sahyna Felix  Subjective:   Natalie Waters is a 42 y o  male with  prior stroke (although recent MRI negative for ischemic injury), mitral valve thrombotic mass removal in 2015, and 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, who is returning to Neurology office for follow up of his seizures  Last seen in the office by Dr Shayna Felix on 11/16/2021  At that time, he had increased frequency of events concerning for seizures  Prior attempts to capture events in the EMU were unable to be tolerated by patient  Recommended transition to another medication as levetiracetam may be contributing to mood changes   He was to continue levetiracetam and oxcarbazepine unchanged and add Depakote to goal dose of 500 mg BID, then decrease levetiracetam to 1000 mg BID  Prior to next appointment he was to have blood work done  Recommended 2 month follow up  Unfortunately blood work was not completed after his last visit  Since his last visit, he has had three seizures  They occurred around the time of his move and losing his house  There was one seizure during sleep, there was one that he felt coming on while awake  He had another one at his cousins wedding (December 30th most recent)  He reports that the Depakote does help him to sleep but it does make him feel nauseous and sometimes he does vomit  His appetite is not great but is unsure if it is related to Depakote  He also notices that it makes him shaky  He did not decrease his levetiracetam dose so he is taking 1500 mg BID  Typically takes his medications with some food  He does not drink so much water  He drinks mostly juice or soda  Continues to have daily premonitions  It tells him what is going to happen in the next three days  This has been occurring for many years  Mostly occurs before going to sleep  Current seizure medications:  - oxcarbazepine 900-1200 mg  - levetiracetam 1500 mg BID  - Depakote 500 mg BID  Other medications as per Epic  Prior Seizure Medications:  Phenytoin, Lamotrigine       I reviewed prior neurology notes, EEG reports, MRI brain report, as documented in Epic/[facility], and summarized above  Objective:    Height 5' 11" (1 803 m), weight 98 kg (216 lb)  Physical Exam  No apparent distress  Appears well nourished  Mood appropriate for situation     Neurologic Exam  Mental status- alert and oriented to person, place, and time  Speech appropriate for conversation  Good attention and knowledge  Cranial Nerves- PERRL, EOMS normal, facial sensation and muscles symmetric, hearing intact bilaterally to finger rubs, tongue midline, palate rise symmetrical, shoulder shrug symmetrical     Motor- No pronator drift   Appropriate strength  Moves all extremities freely  No tremor  Sensory-  Intact distally in all extremities to light touch  DTRs- 2+ and symmetric in all extremities  Gait- normal casual gait  Coordination- FNF intact  ROS:  Review of Systems   Constitutional: Negative  Negative for appetite change and fever  HENT: Negative  Negative for hearing loss, tinnitus, trouble swallowing and voice change  Eyes: Negative  Negative for photophobia and pain  Respiratory: Negative  Negative for shortness of breath  Cardiovascular: Negative  Negative for palpitations  Gastrointestinal: Positive for nausea (from new medication, Depakote ) and vomiting  Endocrine: Negative  Negative for cold intolerance  Genitourinary: Negative  Negative for dysuria, frequency and urgency  Musculoskeletal: Negative  Negative for myalgias and neck pain  Skin: Negative  Negative for rash  Neurological: Positive for seizures (3 seizures, December 30th had a big episode)  Negative for dizziness, tremors, syncope, facial asymmetry, speech difficulty, weakness, light-headedness, numbness and headaches  Hematological: Negative  Does not bruise/bleed easily  Psychiatric/Behavioral: Negative  Negative for confusion, hallucinations and sleep disturbance  All other systems reviewed and are negative  ROS obtained by MA and reviewed by myself

## 2022-01-25 NOTE — PATIENT INSTRUCTIONS
1  Continue with current medications  2  Have blood work done  3  We will most likely discontinue the Depakote and start something different once the blood work is back  - Medications that we will consider include zonisamide and onfi    Information about zonisamide:  Start zonisamide by taking: zonisamide 100mg tablets- 1 tablet daily x 2 weeks, then increase to two tablets (200mg) daily x 2 week, then increase to  three tablets (300mg) daily- after one week on this dose have zonisamide level drawn  Make sure patient is drinking adequate water to prevent kidney stones  If the patient develops a rash, call the office immediately  Common reactions include somnolence, dizziness, anorexia/ weight loss, nausea, HA, irritability, fatigue, impaired concentration/memory, depression, speech disturbance, and constipation  Serious reactions include gonzalez-mau syndrome/ toxic epidermal necrolysis (rash), aplastic anemia, heat stroke, glaucoma, kidney stones, elevated ammonia level causing encephalopathy, pancreatitis, depression, suicidality, psychosis, rhabdomyolysis, and withdrawal seizures if abrupt discontinuation  Do not stop this medication without calling the office  Information about Onfi:  Start on Onfi 10mg tablets:  start by taking a half tablet (5mg) at night and after one week increase to half a tablet twice per day  Review common side effects:  -serious- respiratory depression, dependency, suicidality, Gonzalez-Mau syndrome (severe rash), anemia, thrombocytopenia, withdrawal seizures with abrupt stopping of medication  - common- sleepiness, fever, lethargy, aggression, irritability, contipation, fatigue, ataxia, difficulty sleeping, hyperactivity, appetite change, confusion, double/blurred vision, and itchiness  Stop the medication immediately if you develop a rash and call the office  4  Call the office with any breakthrough seizures or concerns  5   Follow up with Dr Lisa Ames in 2-3 months  6   Have your take home EEG done

## 2022-01-25 NOTE — PROGRESS NOTES
Review of Systems   Constitutional: Negative  Negative for appetite change and fever  HENT: Negative  Negative for hearing loss, tinnitus, trouble swallowing and voice change  Eyes: Negative  Negative for photophobia and pain  Respiratory: Negative  Negative for shortness of breath  Cardiovascular: Negative  Negative for palpitations  Gastrointestinal: Positive for nausea (from new medication, Depakote ) and vomiting  Endocrine: Negative  Negative for cold intolerance  Genitourinary: Negative  Negative for dysuria, frequency and urgency  Musculoskeletal: Negative  Negative for myalgias and neck pain  Skin: Negative  Negative for rash  Neurological: Positive for seizures (3 seizures, December 30th had a big episode)  Negative for dizziness, tremors, syncope, facial asymmetry, speech difficulty, weakness, light-headedness, numbness and headaches  Hematological: Negative  Does not bruise/bleed easily  Psychiatric/Behavioral: Negative  Negative for confusion, hallucinations and sleep disturbance  All other systems reviewed and are negative

## 2022-01-27 LAB — VALPROATE FREE SERPL-MCNC: 7.3 UG/ML (ref 6–22)

## 2022-01-28 LAB — OXCARBAZEPINE SERPL-MCNC: 20 UG/ML (ref 10–35)

## 2022-01-30 LAB — LEVETIRACETAM SERPL-MCNC: 20.2 UG/ML (ref 10–40)

## 2022-02-03 ENCOUNTER — OFFICE VISIT (OUTPATIENT)
Dept: INTERNAL MEDICINE CLINIC | Facility: CLINIC | Age: 47
End: 2022-02-03
Payer: COMMERCIAL

## 2022-02-03 ENCOUNTER — TELEPHONE (OUTPATIENT)
Dept: INTERNAL MEDICINE CLINIC | Facility: CLINIC | Age: 47
End: 2022-02-03

## 2022-02-03 ENCOUNTER — APPOINTMENT (OUTPATIENT)
Dept: LAB | Facility: CLINIC | Age: 47
End: 2022-02-03
Payer: COMMERCIAL

## 2022-02-03 VITALS
DIASTOLIC BLOOD PRESSURE: 82 MMHG | HEIGHT: 71 IN | HEART RATE: 92 BPM | BODY MASS INDEX: 30.85 KG/M2 | SYSTOLIC BLOOD PRESSURE: 122 MMHG | WEIGHT: 220.4 LBS | OXYGEN SATURATION: 98 %

## 2022-02-03 DIAGNOSIS — E11.51 TYPE 2 DIABETES MELLITUS WITH DIABETIC PERIPHERAL ANGIOPATHY WITHOUT GANGRENE, WITHOUT LONG-TERM CURRENT USE OF INSULIN (HCC): Primary | ICD-10-CM

## 2022-02-03 DIAGNOSIS — I10 ESSENTIAL HYPERTENSION: Chronic | ICD-10-CM

## 2022-02-03 DIAGNOSIS — G40.919 INTRACTABLE EPILEPSY WITHOUT STATUS EPILEPTICUS, UNSPECIFIED EPILEPSY TYPE (HCC): ICD-10-CM

## 2022-02-03 DIAGNOSIS — Z86.73 HISTORY OF CVA (CEREBROVASCULAR ACCIDENT): Chronic | ICD-10-CM

## 2022-02-03 DIAGNOSIS — E11.51 TYPE 2 DIABETES MELLITUS WITH DIABETIC PERIPHERAL ANGIOPATHY WITHOUT GANGRENE, WITHOUT LONG-TERM CURRENT USE OF INSULIN (HCC): ICD-10-CM

## 2022-02-03 LAB
CHOLEST SERPL-MCNC: 106 MG/DL
CREAT UR-MCNC: 133 MG/DL
EST. AVERAGE GLUCOSE BLD GHB EST-MCNC: 108 MG/DL
HBA1C MFR BLD: 5.4 %
HDLC SERPL-MCNC: 40 MG/DL
LDLC SERPL CALC-MCNC: 45 MG/DL (ref 0–100)
LEFT EYE DIABETIC RETINOPATHY: NORMAL
LEFT EYE IMAGE QUALITY: NORMAL
LEFT EYE MACULAR EDEMA: NORMAL
LEFT EYE OTHER RETINOPATHY: NORMAL
MICROALBUMIN UR-MCNC: 5.4 MG/L (ref 0–20)
MICROALBUMIN/CREAT 24H UR: 4 MG/G CREATININE (ref 0–30)
RIGHT EYE DIABETIC RETINOPATHY: NORMAL
RIGHT EYE IMAGE QUALITY: NORMAL
RIGHT EYE MACULAR EDEMA: NORMAL
RIGHT EYE OTHER RETINOPATHY: NORMAL
SEVERITY (EYE EXAM): NORMAL
TRIGL SERPL-MCNC: 103 MG/DL

## 2022-02-03 PROCEDURE — 99214 OFFICE O/P EST MOD 30 MIN: CPT | Performed by: INTERNAL MEDICINE

## 2022-02-03 PROCEDURE — 3044F HG A1C LEVEL LT 7.0%: CPT | Performed by: INTERNAL MEDICINE

## 2022-02-03 PROCEDURE — 80061 LIPID PANEL: CPT

## 2022-02-03 PROCEDURE — 2025F 7 FLD RTA PHOTO W/O RTNOPTHY: CPT | Performed by: INTERNAL MEDICINE

## 2022-02-03 PROCEDURE — 83036 HEMOGLOBIN GLYCOSYLATED A1C: CPT

## 2022-02-03 PROCEDURE — 92250 FUNDUS PHOTOGRAPHY W/I&R: CPT | Performed by: INTERNAL MEDICINE

## 2022-02-03 PROCEDURE — 82043 UR ALBUMIN QUANTITATIVE: CPT

## 2022-02-03 PROCEDURE — 36415 COLL VENOUS BLD VENIPUNCTURE: CPT

## 2022-02-03 PROCEDURE — 82570 ASSAY OF URINE CREATININE: CPT

## 2022-02-03 RX ORDER — ATORVASTATIN CALCIUM 20 MG/1
20 TABLET, FILM COATED ORAL DAILY
Qty: 90 TABLET | Refills: 1 | Status: SHIPPED | OUTPATIENT
Start: 2022-02-03

## 2022-02-03 NOTE — TELEPHONE ENCOUNTER
----- Message from Katt Carrasco DO sent at 2/3/2022  2:10 PM EST -----  Labs look great   A1c is 5 4%

## 2022-02-03 NOTE — ASSESSMENT & PLAN NOTE
Continues to have seizure events   His neurologist recently discussed about getting off depakote and switching to a different seizure medication

## 2022-02-03 NOTE — PROGRESS NOTES
[facility] [address] [place] [address]    NAME: Jessica Lauren  AGE: 42 y o  SEX: male  : 1975     DATE: 2/3/2022     Assessment and Plan:     1  Type 2 diabetes mellitus with diabetic peripheral angiopathy without gangrene, without long-term current use of insulin ([address] )  Assessment & Plan:  Diabetes has been very well controlled  Continue Saint Arelis and [place]ia  Discussed importance of routine eye and foot care  Lab Results   Component Value Date    HGBA1C 5 6 2021       Orders:  -     Hemoglobin A1C; Future; Expected date: 2022  -     Microalbumin / creatinine urine ratio; Future; Expected date: 2022  -     Lipid Panel with Direct LDL reflex; Future; Expected date: 2022  -     sitaGLIPtin (Januvia) 100 mg tablet; Take 1 tablet (100 mg total) by mouth daily  -     IRIS Diabetic eye exam    2  Essential hypertension  Assessment & Plan:  Continue anti-HTN medication  BP is stable  3  History of CVA (cerebrovascular accident)  Assessment & Plan:  Continue with appropriate BP and diabetes control  Continue statin  Orders:  -     atorvastatin (LIPITOR) 20 mg tablet; Take 1 tablet (20 mg total) by mouth daily    4  Intractable epilepsy without status epilepticus, unspecified epilepsy type [facility])  Assessment & Plan:  Continues to have seizure events  His neurologist recently discussed about getting off depakote and switching to a different seizure medication      BMI Counseling: Body mass index is 30 74 kg/m²  The BMI is above normal  Nutrition recommendations include moderation in carbohydrate intake and increasing intake of lean protein  Exercise recommendations include exercising 3-5 times per week  Rationale for BMI follow-up plan is due to patient being overweight or obese  Depression Screening and Follow-up Plan: Patient was screened for depression during today's encounter  They screened negative with a PHQ-2 score of 1         Return in about 6 months (around 8/3/2022) for Follow-up  Chief Complaint:     Chief Complaint   Patient presents with    Follow-up      History of Present Illness:       Continues to struggle with seizure episodes and is following regularly with seizure specialist  Had EMU monitoring in the past  He is off depakote now and will likely be switched to zonisamide or onfi  He continues to take keppra and oxcarbazepine  Review of Systems:     Review of Systems   Constitutional: Negative  Respiratory: Negative  Cardiovascular: Negative  Gastrointestinal: Negative  Musculoskeletal: Negative  Objective:     /82 (BP Location: Left arm, Patient Position: Sitting, Cuff Size: Standard)   Pulse 92   Ht 5' 11" (1 803 m)   Wt 100 kg (220 lb 6 4 oz)   SpO2 98%   BMI 30 74 kg/m²     Physical Exam  Constitutional:       General: He is not in acute distress  Appearance: He is not ill-appearing  Cardiovascular:      Rate and Rhythm: Normal rate and regular rhythm  Pulses: no weak pulses          Dorsalis pedis pulses are 1+ on the right side and 1+ on the left side  Posterior tibial pulses are 1+ on the right side and 1+ on the left side  Heart sounds: No murmur heard  Pulmonary:      Effort: Pulmonary effort is normal  No respiratory distress  Breath sounds: No wheezing  Abdominal:      General: Bowel sounds are normal  There is no distension  Tenderness: There is no abdominal tenderness  Musculoskeletal:      Right lower leg: No edema  Left lower leg: No edema  Feet:      Right foot:      Skin integrity: No ulcer, skin breakdown, erythema, warmth, callus or dry skin  Left foot:      Skin integrity: No ulcer, skin breakdown, erythema, warmth, callus or dry skin  Neurological:      Mental Status: He is alert  Diabetic Foot Exam  Patient's shoes and socks removed  Right Foot/Ankle   Right Foot Inspection  Skin Exam: skin normal and skin intact   No dry skin, no warmth, no callus, no erythema, no maceration, no abnormal color, no pre-ulcer, no ulcer and no callus  Toe Exam: ROM and strength within normal limits  Sensory   Proprioception: intact  Monofilament testing: intact    Vascular  Capillary refills: < 3 seconds  The right DP pulse is 1+  The right PT pulse is 1+  Left Foot/Ankle  Left Foot Inspection  Skin Exam: skin normal and skin intact  No dry skin, no warmth, no erythema, no maceration, normal color, no pre-ulcer, no ulcer and no callus  Toe Exam: ROM and strength within normal limits  Sensory   Proprioception: intact  Monofilament testing: intact    Vascular  Capillary refills: < 3 seconds  The left DP pulse is 1+  The left PT pulse is 1+       Assign Risk Category  No deformity present  No loss of protective sensation  No weak pulses  Risk: 0    Lyle Garcia [facility] [id number]

## 2022-02-03 NOTE — ASSESSMENT & PLAN NOTE
Diabetes has been very well controlled  Continue Saint Arelis and [place]  Discussed importance of routine eye and foot care       Lab Results   Component Value Date    HGBA1C 5 6 08/02/2021

## 2022-02-03 NOTE — PATIENT INSTRUCTIONS
10% - bad control"> 10% - bad control,Hemoglobin A1c (HbA1c) greater than 10% indicating poor diabetic control,Haemoglobin A1c greater than 10% indicating poor diabetic control">   Diabetes Mellitus Type 2 in Adults, Ambulatory Care   GENERAL INFORMATION:   Diabetes mellitus type 2  is a disease that affects how your body uses glucose (sugar)  Insulin helps move sugar out of the blood so it can be used for energy  Normally, when the blood sugar level increases, the pancreas makes more insulin  Type 2 diabetes develops because either the body cannot make enough insulin, or it cannot use the insulin correctly  After many years, your pancreas may stop making insulin  Common symptoms include the following:   · More hunger or thirst than usual     · Frequent urination     · Weight loss without trying     · Blurred vision  Seek immediate care for the following symptoms:   · Severe abdominal pain, or pain that spreads to your back  You may also be vomiting  · Trouble staying awake or focusing    · Shaking or sweating    · Blurred or double vision    · Breath has a fruity, sweet smell    · Breathing is deep and labored, or rapid and shallow    · Heartbeat is fast and weak  Treatment for diabetes mellitus type 2  includes keeping your blood sugar at a normal level  You must eat the right foods, and exercise regularly  You may also need medicine if you cannot control your blood sugar level with nutrition and exercise  Manage diabetes mellitus type 2:   · Check your blood sugar level  You will be taught how to check a small drop of blood in a glucose monitor  Ask your healthcare provider when and how often to check during the day  Ask your healthcare provider what your blood sugar levels should be when you check them  · Keep track of carbohydrates (sugar and starchy foods)  Your blood sugar level can get too high if you eat too many carbohydrates   Your dietitian will help you plan meals and snacks that have the right amount of carbohydrates  · Eat low-fat foods  Some examples are skinless chicken and low-fat milk  · Eat less sodium (salt)  Some examples of high-sodium foods to limit are soy sauce, potato chips, and soup  Do not add salt to food you cook  Limit your use of table salt  · Eat high-fiber foods  Foods that are a good source of fiber include vegetables, whole grain bread, and beans  · Limit alcohol  Alcohol affects your blood sugar level and can make it harder to manage your diabetes  Women should limit alcohol to 1 drink a day  Men should limit alcohol to 2 drinks a day  A drink of alcohol is 12 ounces of beer, 5 ounces of wine, or 1½ ounces of liquor  · Get regular exercise  Exercise can help keep your blood sugar level steady, decrease your risk of heart disease, and help you lose weight  Exercise for at least 30 minutes, 5 days a week  Include muscle strengthening activities 2 days each week  Work with your healthcare provider to create an exercise plan  · Check your feet each day  for injuries or open sores  Ask your healthcare provider for activities you can do if you have an open sore  · Quit smoking  If you smoke, it is never too late to quit  Smoking can worsen the problems that may occur with diabetes  Ask your healthcare provider for information about how to stop smoking if you are having trouble quitting  · Ask about your weight:  Ask healthcare providers if you need to lose weight, and how much to lose  Ask them to help you with a weight loss program  Even a 10 to 15 pound weight loss can help you manage your blood sugar level  · Carry medical alert identification  Wear medical alert jewelry or carry a card that says you have diabetes  Ask your healthcare provider where to get these items  · Ask about vaccines  Diabetes puts you at risk of serious illness if you get the flu, pneumonia, or hepatitis   Ask your healthcare provider if you should get a flu, pneumonia, or hepatitis B vaccine, and when to get the vaccine  Follow up with your healthcare provider as directed:  Write down your questions so you remember to ask them during your visits  CARE AGREEMENT:   You have the right to help plan your care  Learn about your health condition and how it may be treated  Discuss treatment options with your caregivers to decide what care you want to receive  You always have the right to refuse treatment  The above information is an  only  It is not intended as medical advice for individual conditions or treatments  Talk to your doctor, nurse or pharmacist before following any medical regimen to see if it is safe and effective for you  © 2014 [address] is for End User's use only and may not be sold, redistributed or otherwise used for commercial purposes  All illustrations and images included in CareNotes® are the copyrighted property of A D A M [employer]  or [employer] Jose

## 2022-02-03 NOTE — TELEPHONE ENCOUNTER
----- Message from Lidia Walton DO sent at 2/3/2022 10:06 AM EST -----  Please let patient know there was no evidence of diabetic retinopathy on his eye exam from our office

## 2022-02-24 ENCOUNTER — OFFICE VISIT (OUTPATIENT)
Dept: NEUROLOGY | Facility: CLINIC | Age: 47
End: 2022-02-24
Payer: COMMERCIAL

## 2022-02-24 VITALS
BODY MASS INDEX: 30.17 KG/M2 | HEIGHT: 71 IN | HEART RATE: 74 BPM | SYSTOLIC BLOOD PRESSURE: 161 MMHG | TEMPERATURE: 95.9 F | DIASTOLIC BLOOD PRESSURE: 99 MMHG | WEIGHT: 215.5 LBS

## 2022-02-24 DIAGNOSIS — G40.909 NONINTRACTABLE EPILEPSY WITHOUT STATUS EPILEPTICUS, UNSPECIFIED EPILEPSY TYPE (HCC): Chronic | ICD-10-CM

## 2022-02-24 DIAGNOSIS — G40.919 INTRACTABLE EPILEPSY WITHOUT STATUS EPILEPTICUS, UNSPECIFIED EPILEPSY TYPE (HCC): Primary | ICD-10-CM

## 2022-02-24 PROCEDURE — 99214 OFFICE O/P EST MOD 30 MIN: CPT | Performed by: PSYCHIATRY & NEUROLOGY

## 2022-02-24 RX ORDER — OXCARBAZEPINE 600 MG/1
TABLET, FILM COATED ORAL
Qty: 105 TABLET | Refills: 11 | Status: SHIPPED | OUTPATIENT
Start: 2022-02-24

## 2022-02-24 RX ORDER — ZONISAMIDE 100 MG/1
CAPSULE ORAL
Qty: 90 CAPSULE | Refills: 5 | Status: SHIPPED | OUTPATIENT
Start: 2022-02-24 | End: 2022-05-17 | Stop reason: SDUPTHER

## 2022-02-24 NOTE — PATIENT INSTRUCTIONS
Start taking Zonisamide - Start by taking 1 pill (100mg) for the first week at night, the second week start taking 2 pills (200mg) at night, and then the third week start taking 3 pills (300mg) at night and then continue at this dosage

## 2022-02-24 NOTE — PROGRESS NOTES
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, who is returning to Neurology office for follow up of his seizures  The patient was last seen by Ksenia Ricci 1/25/2022 and Dr Kenneth Neff in 11/16/2021  The patient has not had any seizures since his last office visit with Verenice Littlejohn in January  He stopped Depakote due to side effects  Examination today was unremarkable  Plan  - Continue Levetiracetam 1500 mg twice a day and Oxcarbazepine 900 mg in the am and 1200 mg in the pm    - Will start Zonisamide - Pt is to start by taking 1 pill (100mg) for the first week at night, the second week he will take 2 pills (200mg) at night, and then the third week he will start taking 3 pills (300mg) at night and then continue at this dosage   - Would ideally like to have the patient come to the EMU for an admission, we will continue the conversation with the patient at future visits       Diagnoses and all orders for this visit:    Intractable epilepsy without status epilepticus, unspecified epilepsy type ([facility])  -     zonisamide (Zonegran) 100 mg capsule; Start taking 1 pill (100mg) nightly x 1 week, then take 2 pills (200mg) at night x 1 week, then take 3 pills (300mg) at night    Nonintractable epilepsy without status epilepticus, unspecified epilepsy type ([facility] [address] )  -     OXcarbazepine (TRILEPTAL) 600 mg tablet; Take 1 5 tabs (900 mg ) in the morning and 2 tabs (1200 mg) nightly  Subjective:  Etienne Sweeney is a 42 y o  male with  prior stroke (although recent MRI negative for ischemic injury), mitral valve thrombotic mass removal in 2015, and 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, who is returning to Neurology office for follow up of his seizures   The patient was last seen by Ksenia Ricci 1/25/2022 and Dr Kenneth Neff in 11/16/2021  Since the last visit with Dr Opal Alas he reports 3 seizures, December 28th, 29th, and 30th  They progressively increased in intensity until the 12/30 was a "full blown seizure"  The seizures on the 28th - in his sleep, memory loss "couldn't really describe it", was asking a lot of questions such as what happened, did he take his meds, what day is it  The 29th while he walking, couldn't walk anymore, sat down, had a mouth motion for about 10-15 seconds that is best described as lip licking, he also had a "dead stare" and looked in one direction and during this he had stopped speaking, came to about 2 minutes after doing the mouth movement which then was followed but questioning about what happened, his wife said that he was able to recognize her after about 2 minute and knew where he was  12/30 seizure at a wedding, said he was not feeling well and dizzy, he was sweating, said he needed to lay down, looked pale and gray in the face per the wife, the pt then got up walked a couple of steps and then fell against a wall and hit his head, he took about 3 minutes to come too, ambulance was called, he did not go to the hospital  He has not had any seizures since his visit with Chris Rodriguez in January  He also since the last visit tried Depakote but it was topped due to side effects nausea, vomiting, dizziness, and tremoring  Reports that the tremoring continued for another 2 weeks after stopping it  Current seizure medications:  1  Levetiracetam 1500 mg twice a day  2  Oxcarbazepine 900 mg in the am and 1200 mg in the pm    Other medications as per Epic      The patient had an attempted EMU admission June 6/30/2021-6/30/2021 and 8/24/2021-8/25/2021, however he was not able to stay in the hospital to complete a full evaluatioin during these visits and no diagnostic spells were captured  Per previous documentation: "He is under a lot of stress   Their house is going through foreclosure and they are getting evicted this week  He now feels like often when the seizures have occurred, they have been immediately following some form of bad news or stress  The seizures that all started on 11/4 occurred after the issues with his house started "       Prior Seizure Medications: Phenytoin, Lamotrigine     To review per prior documentation: "The patient reports that his seizures started in 2004 when he was on his way home from work  He was walking and stepped on a manhole which electrocuted him  Afterwards he had difficulty talking and with balance  His family took him to [facility] in [facility] where it took a while to figure out what was going on  He was started on levetiracetam and his symptoms subsided  He was on levetiracetam for about 6 months before stopping it and was event free until a big seizure in 2014 out of sleep  Prior to that he was having episodes at work where coworkers would note he was blank, staring off and not responsive       The event in 2014 caused him to tear his left rotator cuff   He was started on phenytoin which was ineffective and transitioned to levetriacetam  While on levetiracetam he continued to have episodes of staring and possible GTC seizures  He reports that then in 2016 he had a stroke while he was aggravated with some emplyoees  He suddenly unable to move the right side of his face and upper/lower body  They took him to the ER and found a left frontal infarction on the MRI (per prior note from Dr Lucina Mcdaniel on 1/22/2019)    Because of his prior history of a mitral valve mass versus thrombus that was removed in 2015, he was evaluated with an extensive evaluation including a transesophageal echocardiogram, which did not show any further evidence of cardiac mass      When seen by Dr Lucina Mcdaniel for original consult, he had continued to have events from then on  He does meet criteria for medically refractory epilepsy and would benefit from pre-surgical evaluation as well as to better capture and characterize his events  Neuropsychological testing has been completed by Dr Clarissa Fan (impression below)  He has been maintained on oxcarbazepine 900 mg in the morning and 1200 mg at night as well as levetiracetam 1500 mg BID   At prior visit, it was noted by Dr Maikel Rao that following admission, and if seizures are confirmed, noted possibility of transitioning from oxcarbazepine to another medication such as zonisamide  "    The following portions of the patient's history were reviewed and updated as appropriate: allergies, current medications, past family history, past medical history, past social history, past surgical history and problem list     Objective:    Blood pressure 161/99, pulse 74, temperature (!) 95 9 °F (35 5 °C), temperature source Temporal, height 5' 11" (1 803 m), weight 97 8 kg (215 lb 8 oz)  Physical Exam  Vitals and nursing note reviewed  Constitutional:       General: He is awake  He is not in acute distress  Appearance: He is not ill-appearing, toxic-appearing or diaphoretic  HENT:      Head: Normocephalic and atraumatic  Nose: Nose normal       Mouth/Throat:      Mouth: Mucous membranes are moist       Pharynx: Oropharynx is clear  No oropharyngeal exudate  Eyes:      General: Lids are normal  No scleral icterus  Right eye: No discharge  Left eye: No discharge  Extraocular Movements: Extraocular movements intact  Pupils: Pupils are equal, round, and reactive to light  Cardiovascular:      Rate and Rhythm: Normal rate  Pulses: Normal pulses  Pulmonary:      Effort: Pulmonary effort is normal    Abdominal:      General: Abdomen is flat  Musculoskeletal:         General: No swelling or deformity  Skin:     General: Skin is warm and dry  Neurological:      Mental Status: He is alert     Psychiatric:         Mood and Affect: Mood normal          Speech: Speech normal          Behavior: Behavior normal          Neurological Exam  Mental Status  Awake and alert  Recent and remote memory are intact  Speech is normal  Language is fluent with no aphasia  Attention and concentration are normal     Cranial Nerves  CN II: Visual acuity is normal  Visual fields full to confrontation  CN III, IV, VI: Extraocular movements intact bilaterally  Normal lids and orbits bilaterally  Pupils equal round and reactive to light bilaterally  CN V: Facial sensation is normal   CN VII: Full and symmetric facial movement  CN VIII: Hearing is normal   CN IX, X: Palate elevates symmetrically  CN XI: Shoulder shrug strength is normal   CN XII: Tongue midline without atrophy or fasciculations  Motor  Normal muscle bulk throughout  Normal muscle tone  No abnormal involuntary movements  Strength is 5/5 in all four extremities except as noted  Sensory  Sensation is intact to light touch, pinprick, vibration and proprioception in all four extremities  Reflexes  Deep tendon reflexes are 2+ and symmetric except as noted  Coordination  Right: Finger-to-nose normal   Left: Finger-to-nose normal     Gait  Casual gait is normal including stance, stride, and arm swing  ROS:    Review of Systems   Constitutional: Negative  Negative for appetite change and fever  HENT: Negative  Negative for hearing loss, tinnitus, trouble swallowing and voice change  Eyes: Negative  Negative for photophobia and pain  Respiratory: Negative  Negative for shortness of breath  Cardiovascular: Negative  Negative for palpitations  Gastrointestinal: Negative  Negative for nausea and vomiting  Endocrine: Negative  Negative for cold intolerance  Genitourinary: Negative  Negative for dysuria, frequency and urgency  Musculoskeletal: Negative  Negative for myalgias and neck pain  Skin: Negative  Negative for rash  Neurological: Negative    Negative for dizziness, tremors, seizures, syncope, facial asymmetry, speech difficulty, weakness, light-headedness, numbness and headaches  Hematological: Negative  Does not bruise/bleed easily  Psychiatric/Behavioral: Negative  Negative for confusion, hallucinations and sleep disturbance

## 2022-03-25 NOTE — ASSESSMENT & PLAN NOTE
Patient with intractable epilepsy who continues to have events concerning for seizures  Currently on oxcarbazepine 900-1200, levetiracetam 1500 mg BID, and Depakote 500 mg BID  He has been having some issues with Depakote (nausea, vomiting, shaky)  Levetiracetam may be contributing to mood  Recommended that the patient have blood work done  Plan to likely transition to zonisamide or onfi and off of Depakote which was discussed with patient  Side effects reviewed and given information about both of these medications  Recommended 72 hour ambulatory EEG to capture events concerning for seizures  Reports daily premonitions  Prior attempts at [facility] admission were not tolerated by patient  Patient will call the office with continued events and concerns  Follow up in 3 months or sooner if needed with Dr Kenneth Neff

## 2022-05-04 ENCOUNTER — TELEPHONE (OUTPATIENT)
Dept: NEUROLOGY | Facility: CLINIC | Age: 47
End: 2022-05-04

## 2022-05-14 DIAGNOSIS — I10 ESSENTIAL HYPERTENSION: ICD-10-CM

## 2022-05-14 RX ORDER — LISINOPRIL 40 MG/1
TABLET ORAL
Qty: 90 TABLET | Refills: 1 | Status: SHIPPED | OUTPATIENT
Start: 2022-05-14

## 2022-05-14 RX ORDER — AMLODIPINE BESYLATE 5 MG/1
TABLET ORAL
Qty: 90 TABLET | Refills: 1 | Status: SHIPPED | OUTPATIENT
Start: 2022-05-14

## 2022-05-17 ENCOUNTER — OFFICE VISIT (OUTPATIENT)
Dept: NEUROLOGY | Facility: CLINIC | Age: 47
End: 2022-05-17
Payer: COMMERCIAL

## 2022-05-17 VITALS
HEART RATE: 75 BPM | WEIGHT: 207 LBS | SYSTOLIC BLOOD PRESSURE: 140 MMHG | BODY MASS INDEX: 28.98 KG/M2 | HEIGHT: 71 IN | DIASTOLIC BLOOD PRESSURE: 84 MMHG

## 2022-05-17 DIAGNOSIS — G40.919 INTRACTABLE EPILEPSY WITHOUT STATUS EPILEPTICUS, UNSPECIFIED EPILEPSY TYPE (HCC): ICD-10-CM

## 2022-05-17 PROCEDURE — 99214 OFFICE O/P EST MOD 30 MIN: CPT | Performed by: PSYCHIATRY & NEUROLOGY

## 2022-05-17 RX ORDER — ZONISAMIDE 100 MG/1
CAPSULE ORAL
Qty: 90 CAPSULE | Refills: 11 | Status: SHIPPED | OUTPATIENT
Start: 2022-05-17

## 2022-05-17 NOTE — PROGRESS NOTES
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 , who is returning to Neurology office for follow up of his seizures  Assessment/Plan:    Intractable epilepsy without status epilepticus [facility])  He has continued to have events concerning for seizures since his last appointment  As noted before, we did attempt to characterize these episodes in the epilepsy monitoring Unit, but he was not able to stay for this admission and no spells were captured  There is a possibility that his episodes may represent nonepileptic psychogenic spells, but do have features that would be concerning for seizure  He did have some improvement with the addition of zonisamide, was not able to tolerate higher doses  It may be reasonable to try to increase this dose again to see if we can get his seizures under better control  --if he does have return of his prior symptoms with increasing zonisamide, we could potentially try to decrease 1 of the doses of his other medications to allow him to tolerate higher zonisamide  --he will continue levetiracetam and oxcarbazepine unchanged for now  He will Return in about 3 months (around 8/17/2022)  Subjective:    HPI  Current seizure medications:  1  Levetiracetam 1500 mg twice a day  2  Oxcarbazepine 900 mg in the am and 1200 mg in the pm    3  Zonisamide 200 mg nightly  Other medications as per Epic  Since his last visit, he did start taking Zonisamide, but the evening he started the full dose of 300 mg nightly, he had an episode of severe ear ringing and felt off  With that, he decreased the Zonisamide  He does feel better when taking the lower dose of this medication  He did have one possible seizure, which he describes as the same odd/happy feeling, and fell to the floor   He just laid on the floor, but still remembers everything that happened  Prior Seizure Medications: Phenytoin, Lamotrigine, Divalproex    His history was also obtained from his wife, who was present at today's visit  Objective:    Blood pressure 140/84, pulse 75, height 5' 11" (1 803 m), weight 93 9 kg (207 lb)  Physical Exam    Neurological Exam      ROS:    Review of Systems   Constitutional: Negative  Negative for appetite change and fever  HENT: Negative  Negative for hearing loss, tinnitus, trouble swallowing and voice change  Eyes: Negative  Negative for photophobia and pain  Respiratory: Negative  Negative for shortness of breath  Cardiovascular: Negative  Negative for palpitations  Gastrointestinal: Negative  Negative for nausea and vomiting  Endocrine: Negative  Negative for cold intolerance  Genitourinary: Negative  Negative for dysuria, frequency and urgency  Musculoskeletal: Negative  Negative for myalgias and neck pain  Skin: Negative  Negative for rash  Neurological: Positive for seizures (patient remembers in April he had this warm, happy feeling over come him, then he dropped to the floor but did not lose memory  took 5 minutes before getting up )  Negative for dizziness, tremors, syncope, facial asymmetry, speech difficulty, weakness, light-headedness, numbness and headaches  Hematological: Negative  Does not bruise/bleed easily  Psychiatric/Behavioral: Negative  Negative for confusion, hallucinations and sleep disturbance  All other systems reviewed and are negative  I personally reviewed the ROS that was entered by the medical assistant    Voice recognition software was used in the generation of this note  There may be unintentional errors including grammatical errors, spelling errors, or pronoun errors

## 2022-05-24 ENCOUNTER — TELEPHONE (OUTPATIENT)
Dept: NEUROLOGY | Facility: CLINIC | Age: 47
End: 2022-05-24

## 2022-05-24 NOTE — TELEPHONE ENCOUNTER
Pt called w/ updated  States that he saw Dr Lane Verdugo on 5/17/22 and he suggested to increase his zonisamide from 2 caps to 3 caps at bedtime  He took 3 caps yesterday  Today he feels sick, had extreme dizziness  His head feels overwhelmed  Same symptoms happened before when he increased it the last time  Pt will decrease it back to 2 caps at bedtime  Reporting mild  seizure on Friday 5/27/22  11:30 pm  He was outside, just standing up  It was unwitnessed  Seizure description: unknown  He loss consciousness  Duration? Unknown    Multiple Seizures? No     Brought to the ER? No     Usual type of seizure? Yes  It was a mild  This is a small seizure per pt  Back to baseline in an about hour  Sleep deprivation? Nothing new  He sleeps about 8 hours/night  Missed Medications? No     Recently/Currently ill (URI, UTI, infections etc ) No    Any new medicatons (including OTC) No    ETOH or Drug use? No    New Stressors?  Yes bec he can't work and drive    Current Medications confirmed as:  zonisamide 100 mg 2 caps at bedtime  keppra 500 mg 3 tabs bid   Trileptal 600 mg 1 5 tabs in the AM and 2 tabs at night       [phone] ok to leave detailed message

## 2022-05-25 ENCOUNTER — TELEPHONE (OUTPATIENT)
Dept: INTERNAL MEDICINE CLINIC | Facility: CLINIC | Age: 47
End: 2022-05-25

## 2022-05-25 NOTE — TELEPHONE ENCOUNTER
Pt has been losing weight; he's OK with it- but he's concerned- could something be going on? Cancer? He had labs done in Feb- all normal  One abn one was CBC in Jan     Weight on: 8/2/21 was: 235  2/3/22: 26  Saw Neurologist on: 5/17  Weight: 207    His medications have been changed

## 2022-05-25 NOTE — TELEPHONE ENCOUNTER
Continue tracking weight and calorie intake   Can discuss at follow-up visit or can make an earlier appt if he wants to discuss prior to August

## 2022-05-27 DIAGNOSIS — E11.51 TYPE 2 DIABETES MELLITUS WITH DIABETIC PERIPHERAL ANGIOPATHY WITHOUT GANGRENE, WITHOUT LONG-TERM CURRENT USE OF INSULIN (HCC): Chronic | ICD-10-CM

## 2022-05-28 RX ORDER — BLOOD SUGAR DIAGNOSTIC
STRIP MISCELLANEOUS
Qty: 50 STRIP | Refills: 7 | Status: SHIPPED | OUTPATIENT
Start: 2022-05-28

## 2022-06-30 NOTE — ASSESSMENT & PLAN NOTE
He has continued to have events concerning for seizures since his last appointment  As noted before, we did attempt to characterize these episodes in the epilepsy monitoring Unit, but he was not able to stay for this admission and no spells were captured  There is a possibility that his episodes may represent nonepileptic psychogenic spells, but do have features that would be concerning for seizure  He did have some improvement with the addition of zonisamide, was not able to tolerate higher doses  It may be reasonable to try to increase this dose again to see if we can get his seizures under better control  --if he does have return of his prior symptoms with increasing zonisamide, we could potentially try to decrease 1 of the doses of his other medications to allow him to tolerate higher zonisamide  --he will continue levetiracetam and oxcarbazepine unchanged for now

## 2022-08-17 DIAGNOSIS — E11.51 TYPE 2 DIABETES MELLITUS WITH DIABETIC PERIPHERAL ANGIOPATHY WITHOUT GANGRENE, WITHOUT LONG-TERM CURRENT USE OF INSULIN (HCC): ICD-10-CM

## 2022-08-17 RX ORDER — SITAGLIPTIN 100 MG/1
TABLET, FILM COATED ORAL
Qty: 90 TABLET | Refills: 0 | Status: SHIPPED | OUTPATIENT
Start: 2022-08-17 | End: 2022-10-06 | Stop reason: SDUPTHER

## 2022-08-18 ENCOUNTER — TELEPHONE (OUTPATIENT)
Dept: INTERNAL MEDICINE CLINIC | Facility: CLINIC | Age: 47
End: 2022-08-18

## 2022-08-18 DIAGNOSIS — R63.4 WEIGHT LOSS, UNINTENTIONAL: ICD-10-CM

## 2022-08-18 DIAGNOSIS — G40.919 INTRACTABLE EPILEPSY WITHOUT STATUS EPILEPTICUS, UNSPECIFIED EPILEPSY TYPE (HCC): Primary | ICD-10-CM

## 2022-08-18 DIAGNOSIS — G40.909 NONINTRACTABLE EPILEPSY WITHOUT STATUS EPILEPTICUS, UNSPECIFIED EPILEPSY TYPE (HCC): Chronic | ICD-10-CM

## 2022-08-18 DIAGNOSIS — R79.89 ELEVATED SERUM CREATININE: ICD-10-CM

## 2022-08-18 RX ORDER — LEVETIRACETAM 500 MG/1
TABLET ORAL
Qty: 180 TABLET | Refills: 11 | Status: SHIPPED | OUTPATIENT
Start: 2022-08-18

## 2022-08-18 NOTE — TELEPHONE ENCOUNTER
Ambulatory Referral to Neurology needed in [facility] for 8/25/22 appointment    DX: [phone]

## 2022-08-25 ENCOUNTER — OFFICE VISIT (OUTPATIENT)
Dept: NEUROLOGY | Facility: CLINIC | Age: 47
End: 2022-08-25
Payer: COMMERCIAL

## 2022-08-25 ENCOUNTER — APPOINTMENT (OUTPATIENT)
Dept: LAB | Facility: CLINIC | Age: 47
End: 2022-08-25
Payer: COMMERCIAL

## 2022-08-25 VITALS
HEIGHT: 71 IN | DIASTOLIC BLOOD PRESSURE: 90 MMHG | WEIGHT: 204 LBS | BODY MASS INDEX: 28.56 KG/M2 | SYSTOLIC BLOOD PRESSURE: 140 MMHG

## 2022-08-25 DIAGNOSIS — G40.919 INTRACTABLE EPILEPSY WITHOUT STATUS EPILEPTICUS, UNSPECIFIED EPILEPSY TYPE (HCC): Primary | ICD-10-CM

## 2022-08-25 DIAGNOSIS — R29.2 HYPERREFLEXIA: ICD-10-CM

## 2022-08-25 DIAGNOSIS — G40.919 INTRACTABLE EPILEPSY WITHOUT STATUS EPILEPTICUS, UNSPECIFIED EPILEPSY TYPE (HCC): ICD-10-CM

## 2022-08-25 DIAGNOSIS — R63.4 WEIGHT LOSS, UNINTENTIONAL: ICD-10-CM

## 2022-08-25 DIAGNOSIS — R26.89 IMBALANCE: ICD-10-CM

## 2022-08-25 LAB
ALBUMIN SERPL BCP-MCNC: 3.8 G/DL (ref 3.5–5)
ALP SERPL-CCNC: 82 U/L (ref 46–116)
ALT SERPL W P-5'-P-CCNC: 33 U/L (ref 12–78)
ANION GAP SERPL CALCULATED.3IONS-SCNC: 2 MMOL/L (ref 4–13)
AST SERPL W P-5'-P-CCNC: 19 U/L (ref 5–45)
BASOPHILS # BLD AUTO: 0.04 THOUSANDS/ΜL (ref 0–0.1)
BASOPHILS NFR BLD AUTO: 1 % (ref 0–1)
BILIRUB SERPL-MCNC: 0.23 MG/DL (ref 0.2–1)
BUN SERPL-MCNC: 24 MG/DL (ref 5–25)
CALCIUM SERPL-MCNC: 9.3 MG/DL (ref 8.3–10.1)
CHLORIDE SERPL-SCNC: 113 MMOL/L (ref 96–108)
CO2 SERPL-SCNC: 27 MMOL/L (ref 21–32)
CREAT SERPL-MCNC: 1.57 MG/DL (ref 0.6–1.3)
EOSINOPHIL # BLD AUTO: 0.18 THOUSAND/ΜL (ref 0–0.61)
EOSINOPHIL NFR BLD AUTO: 2 % (ref 0–6)
ERYTHROCYTE [DISTWIDTH] IN BLOOD BY AUTOMATED COUNT: 13.1 % (ref 11.6–15.1)
GFR SERPL CREATININE-BSD FRML MDRD: 52 ML/MIN/1.73SQ M
GLUCOSE SERPL-MCNC: 97 MG/DL (ref 65–140)
HCT VFR BLD AUTO: 46.7 % (ref 36.5–49.3)
HGB BLD-MCNC: 15 G/DL (ref 12–17)
IMM GRANULOCYTES # BLD AUTO: 0.03 THOUSAND/UL (ref 0–0.2)
IMM GRANULOCYTES NFR BLD AUTO: 0 % (ref 0–2)
LYMPHOCYTES # BLD AUTO: 2.02 THOUSANDS/ΜL (ref 0.6–4.47)
LYMPHOCYTES NFR BLD AUTO: 24 % (ref 14–44)
MCH RBC QN AUTO: 28.7 PG (ref 26.8–34.3)
MCHC RBC AUTO-ENTMCNC: 32.1 G/DL (ref 31.4–37.4)
MCV RBC AUTO: 90 FL (ref 82–98)
MONOCYTES # BLD AUTO: 0.42 THOUSAND/ΜL (ref 0.17–1.22)
MONOCYTES NFR BLD AUTO: 5 % (ref 4–12)
NEUTROPHILS # BLD AUTO: 5.6 THOUSANDS/ΜL (ref 1.85–7.62)
NEUTS SEG NFR BLD AUTO: 68 % (ref 43–75)
NRBC BLD AUTO-RTO: 0 /100 WBCS
PLATELET # BLD AUTO: 122 THOUSANDS/UL (ref 149–390)
PMV BLD AUTO: 11.8 FL (ref 8.9–12.7)
POTASSIUM SERPL-SCNC: 3.6 MMOL/L (ref 3.5–5.3)
PROT SERPL-MCNC: 7.6 G/DL (ref 6.4–8.4)
RBC # BLD AUTO: 5.22 MILLION/UL (ref 3.88–5.62)
SODIUM SERPL-SCNC: 142 MMOL/L (ref 135–147)
TSH SERPL DL<=0.05 MIU/L-ACNC: 1.13 UIU/ML (ref 0.45–4.5)
WBC # BLD AUTO: 8.29 THOUSAND/UL (ref 4.31–10.16)

## 2022-08-25 PROCEDURE — 84443 ASSAY THYROID STIM HORMONE: CPT

## 2022-08-25 PROCEDURE — 36415 COLL VENOUS BLD VENIPUNCTURE: CPT

## 2022-08-25 PROCEDURE — 80177 DRUG SCRN QUAN LEVETIRACETAM: CPT

## 2022-08-25 PROCEDURE — 80183 DRUG SCRN QUANT OXCARBAZEPIN: CPT

## 2022-08-25 PROCEDURE — 99214 OFFICE O/P EST MOD 30 MIN: CPT | Performed by: NURSE PRACTITIONER

## 2022-08-25 PROCEDURE — 85025 COMPLETE CBC W/AUTO DIFF WBC: CPT

## 2022-08-25 PROCEDURE — 80053 COMPREHEN METABOLIC PANEL: CPT

## 2022-08-25 PROCEDURE — 80203 DRUG SCREEN QUANT ZONISAMIDE: CPT

## 2022-08-25 RX ORDER — ZONISAMIDE 100 MG/1
200 CAPSULE ORAL
Qty: 180 CAPSULE | Refills: 2 | Status: SHIPPED | OUTPATIENT
Start: 2022-08-25 | End: 2022-08-31

## 2022-08-25 NOTE — PROGRESS NOTES
Review of Systems   Constitutional: Positive for unexpected weight change (losing unexpected weight)  Negative for appetite change and fever  HENT: Negative  Negative for hearing loss, tinnitus, trouble swallowing and voice change  Eyes: Negative  Negative for photophobia and pain  Respiratory: Negative  Negative for shortness of breath  Cardiovascular: Negative  Negative for palpitations  Gastrointestinal: Negative  Negative for nausea and vomiting  Endocrine: Negative  Negative for cold intolerance  Genitourinary: Negative  Negative for dysuria, frequency and urgency  Musculoskeletal: Negative  Negative for myalgias and neck pain  Skin: Negative  Negative for rash  Neurological: Positive for seizures (5/24, patient is experiencing auras)  Negative for dizziness, tremors, syncope, facial asymmetry, speech difficulty, weakness, light-headedness, numbness and headaches  Hematological: Negative  Does not bruise/bleed easily  Psychiatric/Behavioral: Negative  Negative for confusion, hallucinations and sleep disturbance  All other systems reviewed and are negative

## 2022-08-25 NOTE — PATIENT INSTRUCTIONS
- Continue current medications  - Have your blood work done - we will call you with results and after I speak with Dr Shayna Felix about possible changes     - Call the office with further seizures  - Follow up with Dr Shayna Felix in 3 months sitting

## 2022-08-28 LAB — OXCARBAZEPINE SERPL-MCNC: 23 UG/ML (ref 10–35)

## 2022-08-29 LAB
LEVETIRACETAM SERPL-MCNC: 25.5 UG/ML (ref 10–40)
ZONISAMIDE SERPL-MCNC: 5.9 UG/ML (ref 10–40)

## 2022-08-30 ENCOUNTER — TELEPHONE (OUTPATIENT)
Dept: NEUROLOGY | Facility: CLINIC | Age: 47
End: 2022-08-30

## 2022-08-30 DIAGNOSIS — R29.2 HYPERREFLEXIA OF LOWER EXTREMITY: ICD-10-CM

## 2022-08-30 DIAGNOSIS — R26.89 IMBALANCE: ICD-10-CM

## 2022-08-30 DIAGNOSIS — G40.919 INTRACTABLE EPILEPSY WITHOUT STATUS EPILEPTICUS, UNSPECIFIED EPILEPSY TYPE (HCC): Primary | ICD-10-CM

## 2022-08-30 DIAGNOSIS — G40.909 NONINTRACTABLE EPILEPSY WITHOUT STATUS EPILEPTICUS, UNSPECIFIED EPILEPSY TYPE (HCC): Chronic | ICD-10-CM

## 2022-08-30 NOTE — TELEPHONE ENCOUNTER
----- Message from Gómez Drummond sent at 8/25/2022  7:30 PM EDT -----  Please let patient know that I reviewed the labs that have resulted so far  The drug levels are going to be pending for a few days  His TSH or thyroid level is normal      On CBC- his platelet count is low, but this is consistent with prior platelet count numbers  Otherwise his CBC looks good  On CMP- His chloride is elevated but this is often associated with zonisamide use  More concerning is his creatinine level  It is higher than it has been in the past  It is possible that he is dehydrated so he should drink plenty of water for the next few days  I want him to have this rechecked next week and if it is not back to normal, he will need to follow up with his PCP or possibly nephrology given his diabetes  Please let patient know that I am going to forward these results to his PCP as well so that they can stay in the loop

## 2022-08-30 NOTE — TELEPHONE ENCOUNTER
Called pt back and left detailed message per communication consent re: below  Did not see repeat CMP ordered  I entered CMP order

## 2022-08-31 PROBLEM — R29.2 HYPERREFLEXIA: Status: ACTIVE | Noted: 2022-08-31

## 2022-08-31 PROBLEM — R63.4 WEIGHT LOSS, UNINTENTIONAL: Status: ACTIVE | Noted: 2022-08-31

## 2022-08-31 RX ORDER — OXCARBAZEPINE 600 MG/1
TABLET, FILM COATED ORAL
Qty: 105 TABLET | Refills: 11 | Status: SHIPPED | OUTPATIENT
Start: 2022-08-31

## 2022-08-31 RX ORDER — ZONISAMIDE 100 MG/1
CAPSULE ORAL
Qty: 180 CAPSULE | Refills: 2 | Status: SHIPPED | OUTPATIENT
Start: 2022-08-31

## 2022-08-31 NOTE — TELEPHONE ENCOUNTER
Also please let patient know that I spoke with his PCP and they have added on some urine studies for him to have done and stool sample to check for blood in his stool

## 2022-08-31 NOTE — ASSESSMENT & PLAN NOTE
Patient with intractable epilepsy who continues to have events concerning for seizures  Currently on oxcarbazepine 900-1200, levetiracetam 1500 mg BID, and zonisamide 200 mg HS  He did not tolerate recent attempt at increase in zonisamide  Levetiracetam may be contributing to mood  Recommended that the patient have blood work done  Considering increasing oxcarbazepine and weaning off of zonisamide  Zonisamide may be helpful but does not tolerate a dose that will likely prevent seizures from occurring  This may also be contributing to his significant weight loss, although it is likely not the only cause of his weight loss as he is on a low dose of the medication and reports that he has not changed the way that he eats and does not exercise recently due to concerns that he will lose more weight  On exam he does have hyperreflexia of bilateral lower extremities and abnormal gait  Will check MRI T and C spine to rule out focal lesion in the setting of significant weight loss as well  Encouraged walking in neighborhood with wife to increase exercise  Labs ordered as well  Patient will call the office with continued events and concerns  Follow up in 3 months or sooner if needed with Dr Lashanda Olmos  Encouraged calling the office with any new concerns or issues in the meantime

## 2022-08-31 NOTE — ASSESSMENT & PLAN NOTE
Patient with unintentional weight loss without changing eating habits  Weight in August 2020 was 242  Today weight is 204, 32 lbs of which in the last year  Checking TSH  Zonisamide may contribute to decreased appetite but he denies changing how much he eating  He is also exercising less due to concerns that he will lose more weight or have a seizure outside of his home  Encouraged walking in his neighborhood with family member to keep active  Consider weaning off of zonisamide to see if it is contributing once blood work is back if another medication can tolerate an increase in dose

## 2022-08-31 NOTE — TELEPHONE ENCOUNTER
Latest Reference Range & Units 08/25/22 14:02   LEVETIRACETA (KEPPRA) 10 0 - 40 0 ug/mL 25 5   ZONISAMIDE LEVEL 10 0 - 40 0 ug/mL 5 9 (L)   OXCARBAZEPINE 10 - 35 ug/mL 23     Please let Dale know that I reviewed results with Dr Brynn Alexander and what we discussed at his visit  Let's go ahead and increase his oxcarbazepine to 1200 mg twice per day  We can also wean off of zonisamide due to weight loss and side effects  I'm not totally convinced that the zonisamide has caused his weight loss but I think if it has been decreasing his appetite enough we should go ahead and wean off  If his seizures become more frequent, he needs to let us know right away  We will decrease zonisamide to 100 mg HS for two weeks and stop  This can be done at the same time his dose of oxcarbazepine is increased  Since we are weaning off of the zonisamide at the same time as increasing, he hopefully won't notice any side effects or worsening dizziness  I did speak to Dr Brynn Alexander about his weight loss as well as his walking  I want to add a few more labs to his CMP as well  I am also going to order an MRI of his thoracic and lumbar spine due to his gait abnormalities and hyperreflexia of bilateral lower extremities

## 2022-08-31 NOTE — ASSESSMENT & PLAN NOTE
As above, MRI T and C spine ordered  Discussed with Dr Shayna Felix after visit, checking TSH, B12, MMA, ceruloplasm, copper as well

## 2022-09-01 NOTE — TELEPHONE ENCOUNTER
Spoke to patient  He feels zonisamide was medication that was actually helping to prevent seizures  he is reluctant to wean off medication and would actually like to continue with medications as he is currently taking  Will get labs and MRI and requested  zonisamide 200mg bedtime    Oxcarbazepine 600mg tablets - 1 5 tablets in am, 2 tablets in pm

## 2022-09-01 NOTE — TELEPHONE ENCOUNTER
Granted how low his level is I am not sure how much it is helping  I am going to forward encounter to Dr Meron Salguero as well to keep him in the loop and see if he has any further recommendations

## 2022-09-28 ENCOUNTER — VBI (OUTPATIENT)
Dept: ADMINISTRATIVE | Facility: OTHER | Age: 47
End: 2022-09-28

## 2022-09-29 ENCOUNTER — TELEPHONE (OUTPATIENT)
Dept: NEUROLOGY | Facility: CLINIC | Age: 47
End: 2022-09-29

## 2022-09-29 NOTE — TELEPHONE ENCOUNTER
Anayeliestela King and his wife came in looking to have domestic relations paper work signed by Dr Colin Waller today, he had called yesterday and someone told him that Dr Colin Waller was going to be in [facility] today  They drove from [facility] and were very upset to have to drive all the way back up to [facility] office to have a form signed and then back to [facility] [employer] office as she needs to original form not a copy  I apologized and called up to the [facility] office and spoke to [address] [place] [address], confirming that Dr Colin Waller will be in the office till at least noon today  Dianne signed the paper but they refused her signature because she is a PA and they are requesting a doctors signature only  They are on their way up to the [facility] office now

## 2022-09-29 NOTE — TELEPHONE ENCOUNTER
Pt and his wife came in to have Domestic Relations paperwork signed by Dr Holden Yung signed them and completed forms have been scanned into chart under this encounter

## 2022-10-05 ENCOUNTER — TELEPHONE (OUTPATIENT)
Dept: NEUROLOGY | Facility: CLINIC | Age: 47
End: 2022-10-05

## 2022-10-05 NOTE — TELEPHONE ENCOUNTER
Good Afternoon,    I called and spoke with Jennifer Ventura in regards to his upcoming testing  He indicated he wanted to let Dr Kenneth Neff know he has had 2 more seizures recently  He indicated that while a call back was not necessary, he wanted to let someone within the care team know so they could document it in his chart  If the care team would like to reach out here is his contact info [phone]  Thank you,    [address]

## 2022-10-06 ENCOUNTER — HOSPITAL ENCOUNTER (OUTPATIENT)
Dept: MRI IMAGING | Facility: HOSPITAL | Age: 47
End: 2022-10-06
Payer: COMMERCIAL

## 2022-10-06 ENCOUNTER — APPOINTMENT (OUTPATIENT)
Dept: MRI IMAGING | Facility: HOSPITAL | Age: 47
End: 2022-10-06
Payer: COMMERCIAL

## 2022-10-06 ENCOUNTER — OFFICE VISIT (OUTPATIENT)
Dept: INTERNAL MEDICINE CLINIC | Facility: CLINIC | Age: 47
End: 2022-10-06
Payer: COMMERCIAL

## 2022-10-06 ENCOUNTER — APPOINTMENT (OUTPATIENT)
Dept: LAB | Facility: CLINIC | Age: 47
End: 2022-10-06
Payer: COMMERCIAL

## 2022-10-06 ENCOUNTER — APPOINTMENT (OUTPATIENT)
Dept: LAB | Facility: HOSPITAL | Age: 47
End: 2022-10-06
Payer: COMMERCIAL

## 2022-10-06 VITALS
HEIGHT: 71 IN | BODY MASS INDEX: 28.7 KG/M2 | DIASTOLIC BLOOD PRESSURE: 86 MMHG | SYSTOLIC BLOOD PRESSURE: 134 MMHG | WEIGHT: 205 LBS | HEART RATE: 69 BPM | OXYGEN SATURATION: 99 %

## 2022-10-06 DIAGNOSIS — E11.51 TYPE 2 DIABETES MELLITUS WITH DIABETIC PERIPHERAL ANGIOPATHY WITHOUT GANGRENE, WITHOUT LONG-TERM CURRENT USE OF INSULIN (HCC): Primary | Chronic | ICD-10-CM

## 2022-10-06 DIAGNOSIS — G40.919 INTRACTABLE EPILEPSY WITHOUT STATUS EPILEPTICUS, UNSPECIFIED EPILEPSY TYPE (HCC): ICD-10-CM

## 2022-10-06 DIAGNOSIS — I10 ESSENTIAL HYPERTENSION: Chronic | ICD-10-CM

## 2022-10-06 DIAGNOSIS — R19.5 ABNORMAL STOOL TEST: Primary | ICD-10-CM

## 2022-10-06 DIAGNOSIS — R79.89 ELEVATED SERUM CREATININE: ICD-10-CM

## 2022-10-06 DIAGNOSIS — R63.4 WEIGHT LOSS, UNINTENTIONAL: ICD-10-CM

## 2022-10-06 DIAGNOSIS — E11.51 TYPE 2 DIABETES MELLITUS WITH DIABETIC PERIPHERAL ANGIOPATHY WITHOUT GANGRENE, WITHOUT LONG-TERM CURRENT USE OF INSULIN (HCC): ICD-10-CM

## 2022-10-06 DIAGNOSIS — Z86.73 HISTORY OF CVA (CEREBROVASCULAR ACCIDENT): Chronic | ICD-10-CM

## 2022-10-06 DIAGNOSIS — R29.2 HYPERREFLEXIA OF LOWER EXTREMITY: ICD-10-CM

## 2022-10-06 DIAGNOSIS — E11.51 TYPE 2 DIABETES MELLITUS WITH DIABETIC PERIPHERAL ANGIOPATHY WITHOUT GANGRENE, WITHOUT LONG-TERM CURRENT USE OF INSULIN (HCC): Chronic | ICD-10-CM

## 2022-10-06 LAB
ALBUMIN SERPL BCP-MCNC: 4 G/DL (ref 3.5–5)
ALP SERPL-CCNC: 82 U/L (ref 46–116)
ALT SERPL W P-5'-P-CCNC: 34 U/L (ref 12–78)
ANION GAP SERPL CALCULATED.3IONS-SCNC: 5 MMOL/L (ref 4–13)
AST SERPL W P-5'-P-CCNC: 21 U/L (ref 5–45)
BILIRUB SERPL-MCNC: 0.31 MG/DL (ref 0.2–1)
BILIRUB UR QL STRIP: NEGATIVE
BUN SERPL-MCNC: 16 MG/DL (ref 5–25)
CALCIUM SERPL-MCNC: 9.5 MG/DL (ref 8.3–10.1)
CHLORIDE SERPL-SCNC: 108 MMOL/L (ref 96–108)
CHOLEST SERPL-MCNC: 89 MG/DL
CLARITY UR: CLEAR
CO2 SERPL-SCNC: 26 MMOL/L (ref 21–32)
COLOR UR: NORMAL
CREAT SERPL-MCNC: 1.35 MG/DL (ref 0.6–1.3)
CREAT UR-MCNC: 122 MG/DL
EST. AVERAGE GLUCOSE BLD GHB EST-MCNC: 108 MG/DL
GFR SERPL CREATININE-BSD FRML MDRD: 62 ML/MIN/1.73SQ M
GLUCOSE P FAST SERPL-MCNC: 126 MG/DL (ref 65–99)
GLUCOSE UR STRIP-MCNC: NEGATIVE MG/DL
HBA1C MFR BLD: 5.4 %
HDLC SERPL-MCNC: 39 MG/DL
HEMOCCULT STL QL IA: POSITIVE
HGB UR QL STRIP.AUTO: NEGATIVE
KETONES UR STRIP-MCNC: NEGATIVE MG/DL
LDLC SERPL CALC-MCNC: 42 MG/DL (ref 0–100)
LEUKOCYTE ESTERASE UR QL STRIP: NEGATIVE
NITRITE UR QL STRIP: NEGATIVE
PH UR STRIP.AUTO: 6 [PH]
POTASSIUM SERPL-SCNC: 3.7 MMOL/L (ref 3.5–5.3)
PROT SERPL-MCNC: 7.5 G/DL (ref 6.4–8.4)
PROT UR STRIP-MCNC: NEGATIVE MG/DL
PROT UR-MCNC: 13 MG/DL
PROT/CREAT UR: 0.11 MG/G{CREAT} (ref 0–0.1)
SODIUM SERPL-SCNC: 139 MMOL/L (ref 135–147)
SP GR UR STRIP.AUTO: 1.02 (ref 1–1.03)
TRIGL SERPL-MCNC: 39 MG/DL
UROBILINOGEN UR STRIP-ACNC: <2 MG/DL
VIT B12 SERPL-MCNC: 901 PG/ML (ref 100–900)

## 2022-10-06 PROCEDURE — G1004 CDSM NDSC: HCPCS

## 2022-10-06 PROCEDURE — 82570 ASSAY OF URINE CREATININE: CPT

## 2022-10-06 PROCEDURE — 81003 URINALYSIS AUTO W/O SCOPE: CPT

## 2022-10-06 PROCEDURE — 36415 COLL VENOUS BLD VENIPUNCTURE: CPT

## 2022-10-06 PROCEDURE — 83036 HEMOGLOBIN GLYCOSYLATED A1C: CPT

## 2022-10-06 PROCEDURE — 83918 ORGANIC ACIDS TOTAL QUANT: CPT

## 2022-10-06 PROCEDURE — 99214 OFFICE O/P EST MOD 30 MIN: CPT | Performed by: INTERNAL MEDICINE

## 2022-10-06 PROCEDURE — 84156 ASSAY OF PROTEIN URINE: CPT

## 2022-10-06 PROCEDURE — 82390 ASSAY OF CERULOPLASMIN: CPT

## 2022-10-06 PROCEDURE — 72157 MRI CHEST SPINE W/O & W/DYE: CPT

## 2022-10-06 PROCEDURE — 82607 VITAMIN B-12: CPT

## 2022-10-06 PROCEDURE — 82525 ASSAY OF COPPER: CPT

## 2022-10-06 PROCEDURE — A9585 GADOBUTROL INJECTION: HCPCS | Performed by: NURSE PRACTITIONER

## 2022-10-06 PROCEDURE — G0328 FECAL BLOOD SCRN IMMUNOASSAY: HCPCS

## 2022-10-06 PROCEDURE — 80061 LIPID PANEL: CPT

## 2022-10-06 PROCEDURE — 80053 COMPREHEN METABOLIC PANEL: CPT

## 2022-10-06 RX ORDER — CLOBETASOL PROPIONATE 0.46 MG/ML
SOLUTION TOPICAL
COMMUNITY
Start: 2022-08-29

## 2022-10-06 RX ORDER — ATORVASTATIN CALCIUM 20 MG/1
20 TABLET, FILM COATED ORAL DAILY
Qty: 90 TABLET | Refills: 1 | Status: SHIPPED | OUTPATIENT
Start: 2022-10-06

## 2022-10-06 RX ADMIN — GADOBUTROL 9 ML: 604.72 INJECTION INTRAVENOUS at 14:29

## 2022-10-06 NOTE — PROGRESS NOTES
[facility]    NAME: Juan Lakhani  AGE: 41 y o  SEX: male  : 1975     DATE: 10/6/2022     Assessment and Plan:     1  Type 2 diabetes mellitus with diabetic peripheral angiopathy without gangrene, without long-term current use of insulin ([address] )    Most recent A1c was 5 4 % on 2/3/2022  Very well controlled  Need to check labs today  If GFR is still decreased, may back off on his januvia to 50mg  Lab Units 22  1402 22  1355 21  1244   CREATININE mg/dL 1 57* 1 14 1 03   EGFR ml/min/1 73sq m 52 77 102     - Hemoglobin A1C; Future  - Lipid Panel with Direct LDL reflex; Future    2  Essential hypertension    BP stable in the office  Continue current anti-HTN medication  3  Intractable epilepsy without status epilepticus, unspecified epilepsy type [facility])    Being managed by neurology  Weight appears to have stabilized  Get labs done as ordered  4  History of CVA (cerebrovascular accident)  - atorvastatin (LIPITOR) 20 mg tablet; Take 1 tablet (20 mg total) by mouth daily  Dispense: 90 tablet; Refill: 1      Depression Screening and Follow-up Plan: Patient was screened for depression during today's encounter  They screened negative with a PHQ-2 score of 0  Return in about 6 months (around 2023) for Follow-up  History of Present Illness:     Pawel Hernández presents for follow-up  Was losing weight unintentionally but now it seems to be plateaued  Feel it may be from the seizure medication and losing appetite, but wanted to make sure it's not something else  His diabetes is traditionally very well controlled  Review of Systems:     Review of Systems   Constitutional: Positive for fatigue  Negative for chills and fever  Respiratory: Negative  Cardiovascular: Negative  Gastrointestinal: Negative  Musculoskeletal: Negative         Objective:     /86 (BP Location: Left arm, Patient Position: Sitting, Cuff Size: Standard)   Pulse 69   Ht 5' 11" (1 803 m)   Wt 93 kg (205 lb)   SpO2 99%   BMI 28 59 kg/m²     Physical Exam  Constitutional:       General: He is not in acute distress  Appearance: He is not ill-appearing  Cardiovascular:      Rate and Rhythm: Normal rate and regular rhythm  Heart sounds: No murmur heard  Pulmonary:      Effort: Pulmonary effort is normal  No respiratory distress  Breath sounds: No wheezing  Abdominal:      General: Bowel sounds are normal  There is no distension  Tenderness: There is no abdominal tenderness  Musculoskeletal:      Right lower leg: No edema  Left lower leg: No edema  Neurological:      Mental Status: He is alert         [address]

## 2022-10-06 NOTE — PATIENT INSTRUCTIONS
Type 2 Diabetes Management for Adults   WHAT YOU NEED TO KNOW:   What is type 2 diabetes? Type 2 diabetes is a disease that affects how your body uses glucose (sugar)  Either your body cannot make enough insulin, or it cannot use the insulin correctly  It is important to keep diabetes controlled to prevent damage to your heart, blood vessels, and other organs  What do I need to know about high blood sugar levels? High blood sugar levels may not cause any symptoms  You may feel more thirsty or urinate more often than usual  Over time, high blood sugar levels can damage your nerves, blood vessels, tissues, and organs  The following can increase your blood sugar levels:  Large meals or large amounts of carbohydrates at one time    Less physical activity    Stress    Illness    A lower dose of medicine or insulin, or a late dose    What do I need to know about low blood sugar levels? You can prevent symptoms such as shakiness, dizziness, irritability, or confusion by preventing your blood sugar levels from going too low  Treat a low blood sugar level right away  Drink 4 ounces of juice or have 1 tube of glucose gel  Check your blood sugar level again 10 to 15 minutes later  When the level goes back to normal, eat a meal or snack to prevent another decrease  Keep glucose gel, raisins, or hard candy with you at all times to treat a low blood sugar level  Your blood sugar level can get too low if you take diabetes medicine or insulin and do not eat enough food  If you use insulin, check your blood sugar level before you exercise  If your blood sugar level is below 100 mg/dL, eat 4 crackers or 2 ounces of raisins, or drink 4 ounces of juice  Check your level every 30 minutes if you exercise more than 1 hour  You may need a snack during or after exercise  What can I do to manage my blood sugar levels? Check your blood sugar levels as directed and as needed    Several items are available to use to check your levels  You may need to check by testing a drop of blood in a glucose monitor  You may instead be given a continuous glucose monitoring (CGM) device  The device is worn at all times  The CGM checks your blood sugar level every 5 minutes  It sends results to an electronic device such as a smart phone  A CGM can be used with or without an insulin pump  Talk with your provider to find out which method is best for you  The goal for blood sugar levels before meals  is between 80 and 130 mg/dL and 2 hours after eating  is lower than 180 mg/dL  Make healthy food choices  Work with a dietitian to develop a meal plan that works for you and your schedule  A dietitian can help you learn how to eat the right amount of carbohydrates during your meals and snacks  Carbohydrates can raise your blood sugar level if you eat too many at one time  Examples of foods that contain carbohydrates are breads, cereals, rice, pasta, and sweets  Get regular physical activity  Physical activity can help you get to your target blood sugar level goal and manage your weight  Get at least 150 minutes of moderate to vigorous aerobic physical activity each week  Do not miss more than 2 days in a row  Do not sit longer than 30 minutes at a time  Your healthcare provider can help you create an activity plan  The plan can include the best activities for you and can help you build your strength and endurance  Maintain a healthy weight  Ask your healthcare provider what a healthy weight is for you  Ask him or her to help you create a safe weight loss plan if you are overweight  Take your diabetes medicine or insulin as directed  You may need diabetes medicine, insulin, or both to help control your blood sugar levels  Your healthcare provider will teach you how and when to take your diabetes medicine or insulin   You will also be taught about side effects oral diabetes medicine can cause  Insulin may be injected, or given through a pump or pen  You and your care team will discuss which method is best for you  An insulin pump  is an implanted device that gives your insulin 24 hours a day  An insulin pump prevents the need for multiple insulin injections in a day  An insulin pen  is a device prefilled with the right amount of insulin  You and your family members will be taught how to draw up and give insulin  if this is the best method for you  Your education team will also teach you how to dispose of needles and syringes  You will learn how much insulin you need  and when to give it  You will be taught when not to give insulin  You will also be taught what to do if your blood sugar level drops too low  This may happen if you take insulin and do not eat the right amount of carbohydrates  What else can I do to manage type 2 diabetes? Wear medical alert identification  Wear medical alert jewelry or carry a card that says you have diabetes  Ask your provider where to get these items  Do not smoke  Nicotine and other chemicals in cigarettes and cigars can cause lung and blood vessel damage  It also makes it more difficult to manage your diabetes  Ask your provider for information if you currently smoke and need help to quit  Do not use e-cigarettes or smokeless tobacco in place of cigarettes or to help you quit  They still contain nicotine  Check your feet each day for cuts, scratches, calluses, or other wounds  Look for redness and swelling, and feel for warmth  Wear shoes that fit well  Check your shoes for rocks or other objects that can hurt your feet  Do not walk barefoot or wear shoes without socks  Wear cotton socks to help keep your feet dry  Ask about vaccines you may need  You have a higher risk for serious illness if you get the flu, pneumonia, COVID-19, or hepatitis   Ask your provider if you should get vaccines to prevent these or other diseases, and when to get the vaccines  Talk to your care team if you become stressed about diabetes care  Sometimes being able to fit diabetes care into your life can cause increased stress  The stress can cause you not to take care of yourself properly  Your care team can help by offering tips about self-care  Your care team may suggest you talk to a mental health provider  The provider can listen and offer help with self-care issues  Have someone call your local emergency number (911 in the [address] [facility]) if:   You cannot be woken  You have signs of diabetic ketoacidosis:     confusion, fatigue    vomiting    rapid heartbeat    fruity smelling breath    extreme thirst    dry mouth and skin    You have any of the following signs of a heart attack:      Squeezing, pressure, or pain in your chest    You may  also have any of the following:     Discomfort or pain in your back, neck, jaw, stomach, or arm    Shortness of breath    Nausea or vomiting    Lightheadedness or a sudden cold sweat    You have any of the following signs of a stroke:      Numbness or drooping on one side of your face     Weakness in an arm or leg    Confusion or difficulty speaking    Dizziness, a severe headache, or vision loss    When should I call my doctor or diabetes care team?   You have a sore or wound that will not heal     You have a change in the amount you urinate  Your blood sugar levels are higher than your target goals  You often have lower blood sugar levels than your target goals  Your skin is red, dry, warm, or swollen  You have trouble coping with diabetes, or you feel anxious or depressed  You have questions or concerns about your condition or care  CARE AGREEMENT:   You have the right to help plan your care  Learn about your health condition and how it may be treated  Discuss treatment options with your healthcare providers to decide what care you want to receive   You always have the right to refuse treatment  The above information is an  only  It is not intended as medical advice for individual conditions or treatments  Talk to your doctor, nurse or pharmacist before following any medical regimen to see if it is safe and effective for you  © Copyright [employer] 2022 Information is for End User's use only and may not be sold, redistributed or otherwise used for commercial purposes   All illustrations and images included in CareNotes® are the copyrighted property of A D A M , Inc  or [address] [place] [address]

## 2022-10-06 NOTE — TELEPHONE ENCOUNTER
Spoke to patient  Seizures were typical  Both occurred while he was outside  Believes stress contributed to it (occurred on the day they were trying to obtain signed paperwork from you)  No memory of event immediately  Considers it a small event, similar to blinking episode  Experienced loss of time  Denies missed medication doses       Medications confirmed:  zonisamide - 200mg HS  Oxcarbazepine - 1200mg BID  levetiracetam - 1500mg BID

## 2022-10-07 ENCOUNTER — TELEPHONE (OUTPATIENT)
Dept: INTERNAL MEDICINE CLINIC | Facility: CLINIC | Age: 47
End: 2022-10-07

## 2022-10-07 LAB — CERULOPLASMIN SERPL-MCNC: 22.5 MG/DL (ref 16–31)

## 2022-10-07 NOTE — TELEPHONE ENCOUNTER
----- Message from Sara Burnette DO sent at 10/6/2022  9:20 PM EDT -----  A1c remains excellent  Kidney function got a little better which is good  I would recommend backing down on januvia to 50mg though  I'll send in a new script but with his current 100mg tablets, he can just cut them in half  Cholesterol is awesome  No evidence of UTI  His stool test did come back positive for blood  Because of this a colonoscopy is needed for further investigation  I placed a referral for colonoscopy

## 2022-10-08 LAB — COPPER SERPL-MCNC: 105 UG/DL (ref 69–132)

## 2022-10-11 ENCOUNTER — TELEPHONE (OUTPATIENT)
Dept: NEUROLOGY | Facility: CLINIC | Age: 47
End: 2022-10-11

## 2022-10-11 LAB — METHYLMALONATE SERPL-SCNC: 242 NMOL/L (ref 0–378)

## 2022-10-11 NOTE — TELEPHONE ENCOUNTER
----- Message from Gómez Drummond sent at 10/11/2022  8:13 AM EDT -----  Please let Donzella Cogan know that I took a look at his MRI T-spine results  He does have some disc protrusions but no compression on the spinal cord which is a good thing  No intervention is necessary at this time   Any chance he is interested in PT?

## 2022-10-11 NOTE — TELEPHONE ENCOUNTER
Notified patient of previous  Verbalized understanding  Not interested in PT at this time  Will let office know if he changes his mind  Patient wanted to pass along thanks to you for ordering stool studies  Stool studies were positive for blood, patient now following with GI as well

## 2022-11-10 ENCOUNTER — TELEPHONE (OUTPATIENT)
Dept: NEUROLOGY | Facility: CLINIC | Age: 47
End: 2022-11-10

## 2022-11-11 DIAGNOSIS — I10 ESSENTIAL HYPERTENSION: ICD-10-CM

## 2022-11-11 RX ORDER — AMLODIPINE BESYLATE 5 MG/1
TABLET ORAL
Qty: 90 TABLET | Refills: 1 | Status: SHIPPED | OUTPATIENT
Start: 2022-11-11

## 2022-11-11 RX ORDER — LISINOPRIL 40 MG/1
TABLET ORAL
Qty: 90 TABLET | Refills: 1 | Status: SHIPPED | OUTPATIENT
Start: 2022-11-11

## 2022-11-15 ENCOUNTER — CONSULT (OUTPATIENT)
Dept: GASTROENTEROLOGY | Facility: CLINIC | Age: 47
End: 2022-11-15

## 2022-11-15 VITALS
DIASTOLIC BLOOD PRESSURE: 80 MMHG | SYSTOLIC BLOOD PRESSURE: 128 MMHG | OXYGEN SATURATION: 95 % | BODY MASS INDEX: 28.7 KG/M2 | HEART RATE: 56 BPM | WEIGHT: 205 LBS | HEIGHT: 71 IN

## 2022-11-15 DIAGNOSIS — R19.5 ABNORMAL STOOL TEST: ICD-10-CM

## 2022-11-15 DIAGNOSIS — R19.5 OCCULT BLOOD IN STOOLS: ICD-10-CM

## 2022-11-15 DIAGNOSIS — R63.4 WEIGHT LOSS: Primary | ICD-10-CM

## 2022-11-15 NOTE — PROGRESS NOTES
[facility]    Dear Glennie Schlatter,     I had the pleasure of seeing your patient Marcia Azul in the office today and I thank you for this kind referral        Chief Complaint:  Weight loss, abnormal stool test      HPI:  Marcia Azul is a 41 y o  male who presents with recent weight loss  Patient began losing weight for no particular reason  He was over 270 lb  When he hit 205 apparently his weight stabilized and he is no longer losing weight  As part of the workup however he underwent stool testing and was found to be fit positive  CBC on August 25th showed normal hemoglobin and hematocrit  Patient take seizure medications  He is also diabetic  He has had some mild change in bowel habits with constipation  This has been since he has lost the weight  He denies any GERD, heartburn, dysphagia or odynophagia  He has no nausea or vomiting  He has no abdominal pain  He has no change in the caliber of his stool  He denies any melena hematochezia or rectal bleeding  He has no other associated symptomatology  He is not taking any blood thinners  He denies any chest pain or shortness of breath         Review of Systems:   Constitutional: No fever or chills, feels well, no tiredness, no recent weight gain or weight loss  HENT: No complaints of earache, no hearing loss, no nosebleeds, no nasal discharge, no sore throat, no hoarseness  Eyes: No complaints of eye pain, no red eyes, no discharge from eyes, no itchy eyes  Cardiovascular: No complaints of slow heart rate, no fast heart rate, no chest pain, no palpitations, no leg claudication, no lower extremity edema  Respiratory: No complaints of shortness of breath, no wheezing, no cough, no SOB on exertion, no orthopnea  Gastrointestinal: As noted in HPI  Genitourinary: No complaints of dysuria, no incontinence, no hesitancy, no nocturia     Musculoskeletal: No complaints of arthralgia, no myalgias, no joint swelling or stiffness, no limb pain or swelling  Neurological: No complaints of headache, no confusion, no convulsions, no numbness or tingling, no dizziness or fainting, no limb weakness, no difficulty walking  Skin: No complaints of skin rash or skin lesions, no itching, no skin wound, no dry skin  Hematological/Lymphatic: No complaints of swollen glands, does not bleed easy  Allergic/Immunologic: No immunocompromised state  Endocrine:  No complaints of polyuria, no polydipsia  Psychiatric/Behavioral: is not suicidal, no sleep disturbances, no anxiety or depression, no change in personality, no emotional problems         Historical Information   Past Medical History:   Diagnosis Date   • Diabetes mellitus ([facility] )    • Hypertension    • 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, but path consistent with thrombotic fibrotic mass, marantic endocarditis   • Nonbacterial thrombotic endocarditis 2018   • Seizure ([facility] )     possible, unclear history   • TIA (transient ischemic attack)     Possible     Past Surgical History:   Procedure Laterality Date   • APPENDECTOMY     • CARDIAC SURGERY     • ROTATOR CUFF REPAIR       Social History   Social History     Substance and Sexual Activity   Alcohol Use Not Currently    Comment: rarely     Social History     Substance and Sexual Activity   Drug Use No    Comment: occasionally     Social History     Tobacco Use   Smoking Status Former Smoker   • Packs/day: 1 00   • Years: 17 00   • Pack years: 17 00   • Types: Cigarettes   • Start date: 1996   • Quit date: 2016   • Years since quittin 8   Smokeless Tobacco Never Used     Family History   Problem Relation Age of Onset   • Heart attack Mother    • Hypertension Mother    • Eczema Mother    • Uterine cancer Maternal Grandmother    • Hypertension Maternal Grandmother    • Diabetes Maternal Grandmother    • Diabetes type II Paternal Grandmother    • Hypertension Paternal Grandmother    • Cancer Paternal Grandfather    • Breast cancer Maternal Aunt    • Diabetes Maternal Aunt    • Eczema Sister    • Hypertension Sister    • Eczema Brother    • Eczema Son    • Eczema Daughter    • Eczema Sister    • Seizures Neg Hx          Current Medications: has a current medication list which includes the following prescription(s): amlodipine, atorvastatin, clobetasol, hydrochlorothiazide, freestyle, levetiracetam, lisinopril, onetouch ultra, oxcarbazepine, sitagliptin, and zonisamide  Vital Signs: /80   Pulse 56   Ht 5' 11" (1 803 m)   Wt 93 kg (205 lb)   SpO2 95%   BMI 28 59 kg/m²     Physical Exam:   Constitutional  General Appearance: No acute distress, well appearing and well nourished  Head  Normocephalic  Eyes  Conjunctivae and lids: No swelling, erythema, or discharge  Pupils and irises: Equal, round and reactive to light  Ears, Nose, Mouth, and Throat  External inspection of ears and nose: Normal  Nasal mucosa, septum and turbinates: Normal without edema or erythema/   Oropharynx: Normal with no erythema, edema, exudate or lesions  Neck  Normal range of motion  Neck supple  Cardiovascular  Auscultation of the heart: Normal rate and rhythm, normal S1 and S2 without murmurs  Examination of the extremities for edema and/or varicosities: Normal  Pulmonary/Chest  Respiratory effort: No increased work of breathing or signs of respiratory distress  Auscultation of lungs: Clear to auscultation, equal breath sounds bilaterally, no wheezes, rales, no rhonchi  Abdomen  Abdomen: Non-tender, no masses  Liver and spleen: No hepatomegaly or splenomegaly  Musculoskeletal  Gait and station: normal   Digits and Nails: normal without clubbing or cyanosis  Inspection/palpation of joints, bones, and muscles: Normal  Neurological  No nystagmus or asterixis  Skin  Skin and subcutaneous tissue: Normal without rashes or lesions  Lymphatic  Palpation of the lymph nodes in neck: No lymphadenopathy     Psychiatric  Orientation to person, place and time: Normal   Mood and affect: Normal          Labs:   Lab Results   Component Value Date    ALT 34 10/06/2022    AST 21 10/06/2022    BUN 16 10/06/2022    CALCIUM 9 5 10/06/2022     10/06/2022    CO2 26 10/06/2022    CREATININE 1 35 (H) 10/06/2022    HDL 39 (L) 10/06/2022    HCT 46 7 08/25/2022    HGB 15 0 08/25/2022    HGBA1C 5 4 10/06/2022    MG 2 0 12/03/2017    PHOS 2 8 12/03/2017     (L) 08/25/2022    K 3 7 10/06/2022    TRIG 39 10/06/2022    WBC 8 29 08/25/2022         X-Rays & Procedures:   No orders to display         ______________________________________________________________________      Assessment & Plan:      Diagnoses and all orders for this visit:    Weight loss  -     Colonoscopy; Future  -     EGD; Future    Abnormal stool test  -     Ambulatory referral for colonoscopy  -     Colonoscopy; Future  -     EGD; Future    Occult blood in stools  -     Colonoscopy; Future  -     EGD; Future      I have taken liberty of scheduling the patient for both EGD and colonoscopy  His weight has stabilized which is a good sign  I will be happy to inform you his results and further recommendations  I would like to thank you for allowing me to participate in his care                With warmest regards,    Cee Mckeon MD, [facility]

## 2022-11-15 NOTE — LETTER
November 15, 2022     [facility],   [address] [place] [address]    Patient: Connor Patiño   YOB: 1975   Date of Visit: 11/15/2022       Dear Dr Garcia Mediate:    Thank you for referring Connor Patiño to me for evaluation  Below are my notes for this consultation  If you have questions, please do not hesitate to call me  I look forward to following your patient along with you  Sincerely,        Duarte Martinez MD        CC: No Recipients  Duarte Martinez MD  11/15/2022  4:15 PM  Incomplete  [facility]    Dear Tristian Thompson,     I had the pleasure of seeing your patient Connor Patiño in the office today and I thank you for this kind referral        Chief Complaint:  Weight loss, abnormal stool test      HPI:  Connor Patiño is a 41 y o  male who presents with recent weight loss  Patient began losing weight for no particular reason  He was over 270 lb  When he hit 205 apparently his weight stabilized and he is no longer losing weight  As part of the workup however he underwent stool testing and was found to be fit positive  CBC on August 25th showed normal hemoglobin and hematocrit  Patient take seizure medications  He is also diabetic  He has had some mild change in bowel habits with constipation  This has been since he has lost the weight  He denies any GERD, heartburn, dysphagia or odynophagia  He has no nausea or vomiting  He has no abdominal pain  He has no change in the caliber of his stool  He denies any melena hematochezia or rectal bleeding  He has no other associated symptomatology  He is not taking any blood thinners  He denies any chest pain or shortness of breath         Review of Systems:   Constitutional: No fever or chills, feels well, no tiredness, no recent weight gain or weight loss  HENT: No complaints of earache, no hearing loss, no nosebleeds, no nasal discharge, no sore throat, no hoarseness      Eyes: No complaints of eye pain, no red eyes, no discharge from eyes, no itchy eyes  Cardiovascular: No complaints of slow heart rate, no fast heart rate, no chest pain, no palpitations, no leg claudication, no lower extremity edema  Respiratory: No complaints of shortness of breath, no wheezing, no cough, no SOB on exertion, no orthopnea  Gastrointestinal: As noted in HPI  Genitourinary: No complaints of dysuria, no incontinence, no hesitancy, no nocturia  Musculoskeletal: No complaints of arthralgia, no myalgias, no joint swelling or stiffness, no limb pain or swelling  Neurological: No complaints of headache, no confusion, no convulsions, no numbness or tingling, no dizziness or fainting, no limb weakness, no difficulty walking  Skin: No complaints of skin rash or skin lesions, no itching, no skin wound, no dry skin  Hematological/Lymphatic: No complaints of swollen glands, does not bleed easy  Allergic/Immunologic: No immunocompromised state  Endocrine:  No complaints of polyuria, no polydipsia  Psychiatric/Behavioral: is not suicidal, no sleep disturbances, no anxiety or depression, no change in personality, no emotional problems         Historical Information   Past Medical History:   Diagnosis Date   • Diabetes mellitus ([facility] )    • Hypertension    • 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, but path consistent with thrombotic fibrotic mass, marantic endocarditis   • 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   • Seizure ([facility] )     possible, unclear history   • TIA (transient ischemic attack)     Possible     Past Surgical History:   Procedure Laterality Date   • APPENDECTOMY     • CARDIAC SURGERY     • ROTATOR CUFF REPAIR       Social History   Social History     Substance and Sexual Activity   Alcohol Use Not Currently    Comment: rarely     Social History     Substance and Sexual Activity   Drug Use No    Comment: occasionally     Social History     Tobacco Use   Smoking Status Former Smoker   • Packs/day: 1 00   • Years: 17 00   • Pack years: 17 00   • Types: Cigarettes   • Start date: 1996   • Quit date: 2016   • Years since quittin 8   Smokeless Tobacco Never Used     Family History   Problem Relation Age of Onset   • Heart attack Mother    • Hypertension Mother    • Eczema Mother    • Uterine cancer Maternal Grandmother    • Hypertension Maternal Grandmother    • Diabetes Maternal Grandmother    • Diabetes type II Paternal Grandmother    • Hypertension Paternal Grandmother    • Cancer Paternal Grandfather    • Breast cancer Maternal Aunt    • Diabetes Maternal Aunt    • Eczema Sister    • Hypertension Sister    • Eczema Brother    • Eczema Son    • Eczema Daughter    • Eczema Sister    • Seizures Neg Hx          Current Medications: has a current medication list which includes the following prescription(s): amlodipine, atorvastatin, clobetasol, hydrochlorothiazide, freestyle, levetiracetam, lisinopril, onetouch ultra, oxcarbazepine, sitagliptin, and zonisamide  Vital Signs: /80   Pulse 56   Ht 5' 11" (1 803 m)   Wt 93 kg (205 lb)   SpO2 95%   BMI 28 59 kg/m²     Physical Exam:   Constitutional  General Appearance: No acute distress, well appearing and well nourished  Head  Normocephalic  Eyes  Conjunctivae and lids: No swelling, erythema, or discharge  Pupils and irises: Equal, round and reactive to light  Ears, Nose, Mouth, and Throat  External inspection of ears and nose: Normal  Nasal mucosa, septum and turbinates: Normal without edema or erythema/   Oropharynx: Normal with no erythema, edema, exudate or lesions  Neck  Normal range of motion  Neck supple  Cardiovascular  Auscultation of the heart: Normal rate and rhythm, normal S1 and S2 without murmurs  Examination of the extremities for edema and/or varicosities: Normal  Pulmonary/Chest  Respiratory effort: No increased work of breathing or signs of respiratory distress     Auscultation of lungs: Clear to auscultation, equal breath sounds bilaterally, no wheezes, rales, no rhonchi  Abdomen  Abdomen: Non-tender, no masses  Liver and spleen: No hepatomegaly or splenomegaly  Musculoskeletal  Gait and station: normal   Digits and Nails: normal without clubbing or cyanosis  Inspection/palpation of joints, bones, and muscles: Normal  Neurological  No nystagmus or asterixis  Skin  Skin and subcutaneous tissue: Normal without rashes or lesions  Lymphatic  Palpation of the lymph nodes in neck: No lymphadenopathy  Psychiatric  Orientation to person, place and time: Normal   Mood and affect: Normal          Labs:   Lab Results   Component Value Date    ALT 34 10/06/2022    AST 21 10/06/2022    BUN 16 10/06/2022    CALCIUM 9 5 10/06/2022     10/06/2022    CO2 26 10/06/2022    CREATININE 1 35 (H) 10/06/2022    HDL 39 (L) 10/06/2022    HCT 46 7 08/25/2022    HGB 15 0 08/25/2022    HGBA1C 5 4 10/06/2022    MG 2 0 12/03/2017    PHOS 2 8 12/03/2017     (L) 08/25/2022    K 3 7 10/06/2022    TRIG 39 10/06/2022    WBC 8 29 08/25/2022         X-Rays & Procedures:   No orders to display         ______________________________________________________________________      Assessment & Plan:      Diagnoses and all orders for this visit:    Weight loss  -     Colonoscopy; Future  -     EGD; Future    Abnormal stool test  -     Ambulatory referral for colonoscopy  -     Colonoscopy; Future  -     EGD; Future    Occult blood in stools  -     Colonoscopy; Future  -     EGD; Future      I have taken liberty of scheduling the patient for both EGD and colonoscopy  His weight has stabilized which is a good sign  I will be happy to inform you his results and further recommendations  I would like to thank you for allowing me to participate in his care                With warmest regards,    Melissa Palacio MD, [facility]

## 2022-11-16 ENCOUNTER — TELEPHONE (OUTPATIENT)
Dept: NEUROLOGY | Facility: CLINIC | Age: 47
End: 2022-11-16

## 2022-11-16 NOTE — TELEPHONE ENCOUNTER
Pt called to cancel his appt today 11/16 with Dr Mariana Felix  He did not want to do Virtual appt  New appt is 3/22/23 @ 9:30 in [facility] with Dr Mariana Felix  Added Pt to wait list and transferred pt to nurse line to leave message about seizures

## 2022-11-16 NOTE — TELEPHONE ENCOUNTER
pt called to reschedule for a yesenia appt at any location Char Mendez is  I offered 1-27-23 at 1230 pm with Dr Char Mendez in [place] and added him to all of Dr Balta Velazquez wait list for all his location and he accepted

## 2023-01-03 ENCOUNTER — OFFICE VISIT (OUTPATIENT)
Dept: NEUROLOGY | Facility: CLINIC | Age: 48
End: 2023-01-03

## 2023-01-03 VITALS
WEIGHT: 213 LBS | BODY MASS INDEX: 29.82 KG/M2 | DIASTOLIC BLOOD PRESSURE: 90 MMHG | HEIGHT: 71 IN | SYSTOLIC BLOOD PRESSURE: 140 MMHG | HEART RATE: 69 BPM

## 2023-01-03 DIAGNOSIS — G40.909 NONINTRACTABLE EPILEPSY WITHOUT STATUS EPILEPTICUS, UNSPECIFIED EPILEPSY TYPE (HCC): Chronic | ICD-10-CM

## 2023-01-03 DIAGNOSIS — G40.919 INTRACTABLE EPILEPSY WITHOUT STATUS EPILEPTICUS, UNSPECIFIED EPILEPSY TYPE (HCC): Primary | ICD-10-CM

## 2023-01-03 RX ORDER — OXCARBAZEPINE 600 MG/1
TABLET, FILM COATED ORAL
Qty: 105 TABLET | Refills: 11 | Status: SHIPPED | OUTPATIENT
Start: 2023-01-03

## 2023-01-03 NOTE — PROGRESS NOTES
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, who is returning to Neurology office for follow up of his seizures  Assessment/Plan:    Intractable epilepsy without status epilepticus ([address] )  He has continued to have events where he has woken overnight and feels different as if he may have had a seizure  No clear daytime episodes  We discussed the possibility of starting a different medication, but he preferred to keep his medications unchanged for now given his side effects when starting Zonisamide in the recent past      -- he will continue his seizure medications unchanged  If he would have additional events concerning for seizures, he will be in touch with our office and we may need to consider another medication  -- I also discussed the importance of establishing with a psychologist as currently planned  He will Return in about 4 months (around 5/3/2023)  Subjective:    HPI  Current seizure medications:  1  Levetiracetam 1500 mg twice a day  2  Oxcarbazepine 900 mg in the am and 1200 mg at night  Other medications as per Epic  Since his last visit, when he was on Zonisamide, he was having problems with nausea, change in bowel patterns, and weight loss  He has since stopped the Zonisamide and he has been doing better  He is now gaining some weight  He did see a Gastroenterologist and is planned to get a colonoscopy and other tests  He did have a total of 5 seizures  The seizures restarted after he stopped the Zonisamide  He is having seizures mainly at night  With the seizures, he and his wife do not recognize any clear events overnight, as they both seem to sleep through the night, but he will wake and feel different  He will wake up with a bad headache, have memory loss, and is confused   The most recent was on November 14th      He did start going to see a therapist to try to work on improving his stress  Prior Seizure Medications: Phenytoin, Lamotrigine, Divalproex, Zonisamide (weight loss/GI side effects)    His history was also obtained from his wife, who was present at today's visit  Objective:    Blood pressure 140/90, pulse 69, height 5' 11" (1 803 m), weight 96 6 kg (213 lb)  Physical Exam    Neurological Exam      ROS:    Review of Systems   Constitutional: Positive for unexpected weight change (losing weight)  Negative for appetite change and fever  HENT: Negative  Negative for hearing loss, tinnitus, trouble swallowing and voice change  Eyes: Negative  Negative for photophobia, pain and visual disturbance  Respiratory: Negative  Negative for shortness of breath  Cardiovascular: Negative  Negative for palpitations  Gastrointestinal: Negative  Negative for nausea and vomiting  Endocrine: Negative  Negative for cold intolerance  Genitourinary: Negative  Negative for dysuria, frequency and urgency  Musculoskeletal: Negative  Negative for gait problem, myalgias and neck pain  Skin: Negative  Negative for rash  Allergic/Immunologic: Negative  Neurological: Positive for seizures (9/29 stressful situation during the day,10/09 while sleeping wakes up with confusion,10/12 while sleeping wakes up with confusion,11/14 while sleeping associated with night sweats)  Negative for dizziness, tremors, syncope, facial asymmetry, speech difficulty, weakness, light-headedness, numbness and headaches  Hematological: Negative  Does not bruise/bleed easily  Psychiatric/Behavioral: Negative  Negative for confusion, hallucinations and sleep disturbance  All other systems reviewed and are negative  I personally reviewed the ROS that was entered by the medical assistant    Voice recognition software was used in the generation of this note   There may be unintentional errors including grammatical errors, spelling errors, or pronoun errors

## 2023-01-03 NOTE — PATIENT INSTRUCTIONS
-- Continue your medications unchanged for now  -- if you continue to have events, please call our office, since we may need to consider adding a different medication       -- Continue to work with your therapist

## 2023-01-15 DIAGNOSIS — I10 ESSENTIAL HYPERTENSION: Chronic | ICD-10-CM

## 2023-01-15 DIAGNOSIS — Z86.73 HISTORY OF CVA (CEREBROVASCULAR ACCIDENT): Chronic | ICD-10-CM

## 2023-01-15 RX ORDER — ATORVASTATIN CALCIUM 20 MG/1
TABLET, FILM COATED ORAL
Qty: 90 TABLET | Refills: 1 | Status: SHIPPED | OUTPATIENT
Start: 2023-01-15

## 2023-01-15 RX ORDER — HYDROCHLOROTHIAZIDE 12.5 MG/1
TABLET ORAL
Qty: 90 TABLET | Refills: 1 | Status: SHIPPED | OUTPATIENT
Start: 2023-01-15

## 2023-01-23 ENCOUNTER — TELEPHONE (OUTPATIENT)
Dept: NEUROLOGY | Facility: CLINIC | Age: 48
End: 2023-01-23

## 2023-01-23 NOTE — TELEPHONE ENCOUNTER
Patient called to cancel 1/27/2023 appt with Dr Guzmán  States just saw provider on 1/3/2023 and this appointment was not necessary  F/U scheduled for May

## 2023-01-31 ENCOUNTER — ANESTHESIA EVENT (OUTPATIENT)
Dept: GASTROENTEROLOGY | Facility: HOSPITAL | Age: 48
End: 2023-01-31

## 2023-01-31 ENCOUNTER — HOSPITAL ENCOUNTER (OUTPATIENT)
Dept: GASTROENTEROLOGY | Facility: HOSPITAL | Age: 48
Setting detail: OUTPATIENT SURGERY
Discharge: HOME | End: 2023-01-31
Attending: INTERNAL MEDICINE | Admitting: INTERNAL MEDICINE

## 2023-01-31 ENCOUNTER — ANESTHESIA (OUTPATIENT)
Dept: GASTROENTEROLOGY | Facility: HOSPITAL | Age: 48
End: 2023-01-31

## 2023-01-31 VITALS
WEIGHT: 216.05 LBS | OXYGEN SATURATION: 97 % | SYSTOLIC BLOOD PRESSURE: 122 MMHG | HEART RATE: 80 BPM | TEMPERATURE: 97.5 F | RESPIRATION RATE: 18 BRPM | DIASTOLIC BLOOD PRESSURE: 63 MMHG | HEIGHT: 71 IN | BODY MASS INDEX: 30.25 KG/M2

## 2023-01-31 DIAGNOSIS — R63.4 WEIGHT LOSS: ICD-10-CM

## 2023-01-31 DIAGNOSIS — R19.5 ABNORMAL STOOL TEST: ICD-10-CM

## 2023-01-31 DIAGNOSIS — R19.5 OCCULT BLOOD IN STOOLS: ICD-10-CM

## 2023-01-31 LAB — GLUCOSE SERPL-MCNC: 121 MG/DL (ref 65–140)

## 2023-01-31 RX ORDER — GLYCOPYRROLATE 0.2 MG/ML
INJECTION INTRAMUSCULAR; INTRAVENOUS AS NEEDED
Status: DISCONTINUED | OUTPATIENT
Start: 2023-01-31 | End: 2023-01-31

## 2023-01-31 RX ORDER — LIDOCAINE HYDROCHLORIDE 20 MG/ML
INJECTION, SOLUTION EPIDURAL; INFILTRATION; INTRACAUDAL; PERINEURAL AS NEEDED
Status: DISCONTINUED | OUTPATIENT
Start: 2023-01-31 | End: 2023-01-31

## 2023-01-31 RX ORDER — PROPOFOL 10 MG/ML
INJECTION, EMULSION INTRAVENOUS AS NEEDED
Status: DISCONTINUED | OUTPATIENT
Start: 2023-01-31 | End: 2023-01-31

## 2023-01-31 RX ORDER — SODIUM CHLORIDE, SODIUM LACTATE, POTASSIUM CHLORIDE, CALCIUM CHLORIDE 600; 310; 30; 20 MG/100ML; MG/100ML; MG/100ML; MG/100ML
INJECTION, SOLUTION INTRAVENOUS CONTINUOUS PRN
Status: DISCONTINUED | OUTPATIENT
Start: 2023-01-31 | End: 2023-01-31

## 2023-01-31 RX ADMIN — PROPOFOL 50 MG: 10 INJECTION, EMULSION INTRAVENOUS at 07:22

## 2023-01-31 RX ADMIN — PROPOFOL 30 MG: 10 INJECTION, EMULSION INTRAVENOUS at 07:34

## 2023-01-31 RX ADMIN — GLYCOPYRROLATE 0.1 MCG: 0.2 INJECTION INTRAMUSCULAR; INTRAVENOUS at 07:16

## 2023-01-31 RX ADMIN — SODIUM CHLORIDE, SODIUM LACTATE, POTASSIUM CHLORIDE, AND CALCIUM CHLORIDE: .6; .31; .03; .02 INJECTION, SOLUTION INTRAVENOUS at 07:16

## 2023-01-31 RX ADMIN — LIDOCAINE HYDROCHLORIDE 5 ML: 20 INJECTION, SOLUTION EPIDURAL; INFILTRATION; INTRACAUDAL; PERINEURAL at 07:19

## 2023-01-31 RX ADMIN — PROPOFOL 50 MG: 10 INJECTION, EMULSION INTRAVENOUS at 07:31

## 2023-01-31 RX ADMIN — PROPOFOL 150 MG: 10 INJECTION, EMULSION INTRAVENOUS at 07:19

## 2023-01-31 RX ADMIN — PROPOFOL 50 MG: 10 INJECTION, EMULSION INTRAVENOUS at 07:27

## 2023-01-31 NOTE — ANESTHESIA PREPROCEDURE EVALUATION
Procedure:  COLONOSCOPY  EGD    Relevant Problems   CARDIO   (+) Essential hypertension   (+) S/P mitral valve repair   (+) Type 2 diabetes mellitus with diabetic peripheral angiopathy without gangrene, without long-term current use of insulin (HCC)      ENDO   (+) Type 2 diabetes mellitus with diabetic peripheral angiopathy without gangrene, without long-term current use of insulin (HCC)      NEURO/PSYCH   (+) History of CVA (cerebrovascular accident)   (+) Intractable epilepsy without status epilepticus ([facility] ) (last seizure december)      Musculoskeletal and Integument   (+) Psoriasis      Other   (+) Obesity (BMI 30-39  9)     Took BP meds and seizure medications this morning     Physical Exam    Airway    Mallampati score: II  TM Distance: >3 FB  Neck ROM: full     Dental       Cardiovascular      Pulmonary      Other Findings        Anesthesia Plan  ASA Score- 3     Anesthesia Type- IV sedation with anesthesia with ASA Monitors  Additional Monitors:   Airway Plan:     Comment: Recent labs personally reviewed:  Lab Results       Component                Value               Date                       WBC                      8 29                08/25/2022                 HGB                      15 0                08/25/2022                 PLT                      122 (L)             08/25/2022            Lab Results       Component                Value               Date                       K                        3 7                 10/06/2022                 BUN                      16                  10/06/2022                 CREATININE               1 35 (H)            10/06/2022            No results found for: PTT   No results found for: INR    Blood type     I, Sarah Muñoz MD, have personally seen and evaluated the patient prior to anesthetic care    I have reviewed the pre-anesthetic record, medical history, allergies, medications and any other medical records if appropriate to the anesthetic Subjective:  Karl is a 56 year old male here for follow up of robotic-assisted laparoscopic ventral herniorrhaphy. Patient states they have been doing well postoperatively. Denies nausea vomiting, no fevers or chills, no chest pain or shortness breath. Tolerating general diet. Voiding spontaneously, bowel movements back to normal, no diarrhea, no constipation. No longer taking oral narcotics.      Current Outpatient Prescriptions   Medication Sig Dispense Refill   • ibuprofen (MOTRIN) 600 MG tablet Take 1 tablet by mouth every 6 hours as needed for Pain. 30 tablet 0   • amLODIPine (NORVASC) 5 MG tablet TAKE ONE TABLET BY MOUTH EVERY DAY 30 tablet 3   • Vitamins-Lipotropics (LIPOFLAVONOID PO)      • DEXILANT 60 MG capsule TAKE ONE CAPSULE BY MOUTH EVERY DAY 90 capsule 2   • citalopram (CELEXA) 20 MG tablet TAKE ONE TABLET BY MOUTH EVERY DAY 90 tablet 2   • Incontinence Supply Disposable (BLADDER CONTROL PADS EX ABSORB) Misc To use as needed for incontinence 3-4 times daily 112 each 3   • tadalafil (CIALIS) 5 MG tablet Take 1 tablet by mouth as needed for Erectile Dysfunction. 30 tablet 1   • Multiple Vitamins-Minerals (MULTIVITAMIN PO) Take by mouth daily.      • Glucos-Chondroit-Hyaluron-MSM (GLUCOSAMINE CHONDROITIN JOINT PO) Take by mouth daily.        No current facility-administered medications for this visit.          Objective:  Visit Vitals  /78   Pulse 88   Ht 6' 3\" (1.905 m)   Wt 124.9 kg   BMI 34.42 kg/m²     GENERAL: appears stated age, is in no apparent distress and is well developed and well nourished  SKIN incisions clean dry intact, no erythema no signs of infection  ABDOMEN: Soft nontender nondistended, no rebound rigidity or guarding, no bulges no signs of any hernias.        Assessment:  Status post laparoscopic ventral herniorrhaphy    Plan:  -All questions answered all issues addressed  -No lifting greater than 40 pounds for another 2 weeks, then no restrictions from surgical  care   If a CRNA is involved in the case, I have reviewed the CRNA assessment, if present, and agree  Patient consented for IV Sedation, general anesthesia as back up  Discussed risks of aspiration, IV infiltration, indications for conversion to general anesthesia  All questions and concerns addressed          Plan Factors-Exercise tolerance (METS): >4 METS  Chart reviewed  Existing labs reviewed  Patient summary reviewed  Patient is not a current smoker  Patient did not smoke on day of surgery  Obstructive sleep apnea risk education given perioperatively  Induction- intravenous  Postoperative Plan-     Informed Consent- Anesthetic plan and risks discussed with patient  I personally reviewed this patient with the CRNA  Discussed and agreed on the Anesthesia Plan with the CRNA  Natan Hopkins standpoint  -Return to care as needed

## 2023-01-31 NOTE — ANESTHESIA POSTPROCEDURE EVALUATION
Post-Op Assessment Note    CV Status:  Stable  Pain Score: 0    Pain management: adequate     Mental Status:  Sleepy and arousable   Hydration Status:  Euvolemic   PONV Controlled:  Controlled   Airway Patency:  Patent      Post Op Vitals Reviewed: Yes      Staff: CRNA   Comments: vss, sv        No notable events documented      BP   133/60   Temp   97 5   Pulse  95   Resp   15   SpO2   96%

## 2023-01-31 NOTE — H&P
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, but path consistent with thrombotic fibrotic mass, marantic endocarditis   • Nonbacterial thrombotic endocarditis 2018   • Seizure ([facility] )     possible, unclear history   • Stroke ([place] [address] )    • TIA (transient ischemic attack)     Possible     Past Surgical History:   Procedure Laterality Date   • APPENDECTOMY     • CARDIAC SURGERY     • ROTATOR CUFF REPAIR       Social History   Social History     Substance and Sexual Activity   Alcohol Use Not Currently    Comment: rarely     Social History     Substance and Sexual Activity   Drug Use No    Comment: occasionally     Social History     Tobacco Use   Smoking Status Former   • Packs/day: 1 00   • Years: 17 00   • Pack years: 17 00   • Types: Cigarettes   • Start date: 1996   • Quit date: 2016   • Years since quittin 0   Smokeless Tobacco Never     Family History   Problem Relation Age of Onset   • Heart attack Mother    • Hypertension Mother    • Eczema Mother    • Uterine cancer Maternal Grandmother    • Hypertension Maternal Grandmother    • Diabetes Maternal Grandmother    • Diabetes type II Paternal Grandmother    • Hypertension Paternal Grandmother    • Cancer Paternal Grandfather    • Breast cancer Maternal Aunt    • Diabetes Maternal Aunt    • Eczema Sister    • Hypertension Sister    • Eczema Brother    • Eczema Son    • Eczema Daughter    • Eczema Sister    • Seizures Neg Hx        Meds/Allergies     (Not in a hospital admission)      Allergies   Allergen Reactions   • Cat Hair Extract Allergic Rhinitis   • Metformin Vomiting   • Shellfish-Derived Products - Food Allergy Itching     Tickling on the back of the throat   • Lamotrigine Hives and Rash     burning   • Penicillins Rash       Objective     Blood pressure 137/80, pulse 92, temperature 97 8 °F (36 6 °C), temperature source Temporal, resp  rate 16, height 5' 11" (1 803 m), weight 98 kg (216 lb 0 8 oz), SpO2 96 %        PHYSICAL EXAM    Gen: NAD  CV: RRR  CHEST: Clear  ABD: soft, NT/ND  EXT: no edema  Neuro: AAO      ASSESSMENT/PLAN:  This is a 41y o  year old male here for weight loss, change in bowel habits, FIT positive    PLAN:   Procedure: EGD and colonoscopy

## 2023-02-09 ENCOUNTER — VBI (OUTPATIENT)
Dept: ADMINISTRATIVE | Facility: OTHER | Age: 48
End: 2023-02-09

## 2023-03-09 NOTE — ASSESSMENT & PLAN NOTE
He has continued to have events where he has woken overnight and feels different as if he may have had a seizure  No clear daytime episodes  We discussed the possibility of starting a different medication, but he preferred to keep his medications unchanged for now given his side effects when starting Zonisamide in the recent past      -- he will continue his seizure medications unchanged  If he would have additional events concerning for seizures, he will be in touch with our office and we may need to consider another medication  -- I also discussed the importance of establishing with a psychologist as currently planned

## 2023-04-07 DIAGNOSIS — E11.51 TYPE 2 DIABETES MELLITUS WITH DIABETIC PERIPHERAL ANGIOPATHY WITHOUT GANGRENE, WITHOUT LONG-TERM CURRENT USE OF INSULIN (HCC): ICD-10-CM

## 2023-04-07 RX ORDER — SITAGLIPTIN 50 MG/1
TABLET, FILM COATED ORAL
Qty: 30 TABLET | Refills: 5 | Status: SHIPPED | OUTPATIENT
Start: 2023-04-07

## 2023-04-11 PROBLEM — R63.4 WEIGHT LOSS, UNINTENTIONAL: Status: RESOLVED | Noted: 2022-08-31 | Resolved: 2023-04-11

## 2023-04-11 PROBLEM — R29.2 HYPERREFLEXIA: Status: RESOLVED | Noted: 2022-08-31 | Resolved: 2023-04-11

## 2023-04-11 PROBLEM — R26.89 IMBALANCE: Status: RESOLVED | Noted: 2021-08-16 | Resolved: 2023-04-11

## 2023-04-13 ENCOUNTER — TELEPHONE (OUTPATIENT)
Age: 48
End: 2023-04-13

## 2023-05-09 DIAGNOSIS — I10 ESSENTIAL HYPERTENSION: ICD-10-CM

## 2023-05-09 RX ORDER — AMLODIPINE BESYLATE 5 MG/1
TABLET ORAL
Qty: 90 TABLET | Refills: 1 | Status: SHIPPED | OUTPATIENT
Start: 2023-05-09

## 2023-05-09 RX ORDER — LISINOPRIL 40 MG/1
TABLET ORAL
Qty: 90 TABLET | Refills: 1 | Status: SHIPPED | OUTPATIENT
Start: 2023-05-09

## 2023-05-16 ENCOUNTER — OFFICE VISIT (OUTPATIENT)
Dept: NEUROLOGY | Facility: CLINIC | Age: 48
End: 2023-05-16

## 2023-05-16 VITALS
DIASTOLIC BLOOD PRESSURE: 90 MMHG | WEIGHT: 214 LBS | BODY MASS INDEX: 29.96 KG/M2 | SYSTOLIC BLOOD PRESSURE: 140 MMHG | HEIGHT: 71 IN

## 2023-05-16 DIAGNOSIS — G40.919 INTRACTABLE EPILEPSY WITHOUT STATUS EPILEPTICUS, UNSPECIFIED EPILEPSY TYPE (HCC): Primary | ICD-10-CM

## 2023-05-16 DIAGNOSIS — G40.909 NONINTRACTABLE EPILEPSY WITHOUT STATUS EPILEPTICUS, UNSPECIFIED EPILEPSY TYPE (HCC): Chronic | ICD-10-CM

## 2023-05-16 RX ORDER — LEVETIRACETAM 500 MG/1
1500 TABLET ORAL EVERY 12 HOURS SCHEDULED
Qty: 180 TABLET | Refills: 11 | Status: SHIPPED | OUTPATIENT
Start: 2023-05-16

## 2023-05-16 NOTE — PROGRESS NOTES
INTERNAL MEDICINE FOLLOW-UP OFFICE VISIT  [facility] - [facility] OF [address] [place] [address]    NAME: Neal Blood  AGE: 41 y o  SEX: male  : 1975     DATE: 2018     Assessment and Plan:     1  Type 2 diabetes mellitus with diabetic peripheral angiopathy without gangrene, without long-term current use of insulin (HCC)    Diabetes has been well controlled  Patient is up-to-date with foot and eye care  Patient's BMI is elevated above 35  Patient needs to be more strict with his diet and increased exercise  Reprinted lab work for patient to get  2  Psoriasis    Patient needs to be on systemic therapy  Will repeat patient's complete blood count  3  Thrombocytopenia ([facility] )    Checkup today complete blood count with differential   Patient has had low platelets in the past     - CBC and differential; Future    4  Essential hypertension    Continue antihypertensives as prescribed  Blood pressure is controlled today  Heart healthy diet is recommended  - lisinopril (ZESTRIL) 40 mg tablet; Take 1 tablet (40 mg total) by mouth daily  Dispense: 90 tablet; Refill: 1    5  Severe obesity (BMI 35 0-39  9) with comorbidity ([facility] )    Body mass index is 35 37 kg/m²  Discussed the patient's BMI with him  The BMI is above average  BMI counseling and education was provided to the patient  Nutrition recommendations include reducing portion sizes, decreasing overall calorie intake, 3-5 servings of fruits/vegetables daily, moderation in carbohydrate intake, increasing intake of lean protein, reducing intake of saturated fat and trans fat and reducing intake of cholesterol  Exercise recommendations include moderate aerobic physical activity for 150 minutes/week, exercising 3-5 times per week and joining a gym  Return in about 4 months (around 2019) for Follow-up       Chief Complaint:     Chief Complaint   Patient presents with    Follow-up     w/ labs- 2018      History of Present Illness:     Patient presents for routine follow-up  Patient did not get blood work before today's appointment  Patient saw his dermatologist for systemic psoriasis and wants to start him on systemic treatment  Patient has tried topical therapy in the past and has not been helping  Patient has plaque psoriasis on his back and his legs  Patient finally had an MRI of his brain which came back normal   Patient has to see a seizure specialist in January 2019  Patient denies any recent seizure-like activities  It has been unclear whether patient is truly experiencing seizures  His blood sugars have been well controlled  He has no retinopathy on eye exam from September  He is taking his medications as prescribed  His last platelet count was slightly low  Looking at past blood work his platelet count has been slightly low in the past   No evidence of cirrhosis on ultrasound of the abdomen  Previous hepatitis panel in the past have been negative  The following portions of the patient's history were reviewed and updated as appropriate: allergies, current medications, past family history, past medical history, past social history, past surgical history and problem list      Review of Systems:     Review of Systems   Constitutional: Negative for activity change, appetite change and fatigue  Respiratory: Negative for apnea, cough, chest tightness, shortness of breath and wheezing  Cardiovascular: Negative for chest pain, palpitations and leg swelling  Gastrointestinal: Negative for abdominal distention, abdominal pain, blood in stool, constipation, diarrhea, nausea and vomiting  Musculoskeletal: Negative for arthralgias, back pain, gait problem, joint swelling and myalgias  Skin: Positive for rash  Negative for wound  Neurological: Negative for dizziness, tremors, seizures, syncope, facial asymmetry, speech difficulty, weakness, light-headedness, numbness and headaches     Psychiatric/Behavioral: Negative for behavioral problems, confusion, hallucinations, sleep disturbance and suicidal ideas  The patient is not nervous/anxious  Problem List:     Patient Active Problem List   Diagnosis    Essential hypertension    Hemispheric carotid artery syndrome    Insomnia    Nonbacterial thrombotic endocarditis    S/P mitral valve repair    Seizure disorder ([facility] )    Type 2 diabetes mellitus with diabetic peripheral angiopathy without gangrene, without long-term current use of insulin ([facility] )    History of CVA (cerebrovascular accident)    Obesity (BMI 30-39  9)      Objective:     /80 (BP Location: Left arm, Patient Position: Sitting, Cuff Size: Standard)   Pulse 94   Ht 5' 11" (1 803 m) Comment: w/ shoe denied off  Wt 115 kg (253 lb 9 6 oz) Comment: w/ shoe denied off  SpO2 97%   BMI 35 37 kg/m²     Physical Exam   Constitutional: He is oriented to person, place, and time  He appears well-developed and well-nourished  No distress  Obesity   Eyes: Conjunctivae are normal  Right eye exhibits no discharge  Left eye exhibits no discharge  No scleral icterus  Neck: Neck supple  No JVD present  No thyromegaly present  Cardiovascular: Normal rate, regular rhythm and normal heart sounds  No murmur heard  Pulmonary/Chest: Effort normal and breath sounds normal  No respiratory distress  He has no wheezes  He has no rales  He exhibits no tenderness  Abdominal: Soft  Bowel sounds are normal  He exhibits no distension and no mass  There is no tenderness  There is no rebound and no guarding  No hernia  Musculoskeletal: He exhibits no edema  Lymphadenopathy:     He has no cervical adenopathy  Neurological: He is alert and oriented to person, place, and time  Skin: Skin is warm and dry  Rash noted  He is not diaphoretic  Psychiatric: He has a normal mood and affect  His behavior is normal    Vitals reviewed        Pertinent Laboratory/Diagnostic Studies:    Laboratory Results: I have personally reviewed the pertinent laboratory results/reports     Radiology/Other Diagnostic Testing Results: I have personally reviewed pertinent reports        David Britton DO  [facility] [address] [facility]

## 2023-05-16 NOTE — PROGRESS NOTES
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, who is returning to Neurology office for follow up of his seizures       Assessment/Plan:    Intractable epilepsy without status epilepticus ([address] )  He has still been having some events concerning for seizures, but these are different than his prior events  Most recently he had an episode where he lost 2 hours of time, but has not had any other events concerning for generalized tonic-clonic seizures  He has noticed that his events have gotten better with working closely with a psychologist and being under less stress  At this point he is not interested in making any changes to his medications since he has been doing better  He will continue oxcarbazepine and levetiracetam unchanged  He did not previously tolerate the initiation of zonisamide, but if he would have additional events concerning for seizures, he may benefit from being started on a different medication  Alternatively, he would likely benefit from being admitted to the epilepsy monitoring unit to capture and characterize his events if he would be willing to do so  He will Return in about 4 months (around 9/16/2023)  Subjective:    HPI  Current seizure medications:  1  Levetiracetam 1500 mg twice a day  2  Oxcarbazepine 900 mg in the am and 1200 mg in the pm    Other medications as per Epic  Since his last visit, he has continued to have \"seizures\", with his last that occurred on 3/2/2023  He recalls being upstairs in the kitchen and then woke up in the bedroom  He is not sure what happened in between that time  He was alone in the house, so no witness to the event  There was about a 2 hour chunk of time that he doesn't remember  He doesn't know if he fell or not, but did have a bruise on his right arm       He has not had any other " "episodes of waking with a headache, memory loss or being confused, which is what he reported at his last visit  He is working with a psychologist  They are working on doing some different assessments and behavioral/cognitive testing to better evaluate his symptoms  He does feel that with improving his stress, his events have been much better and much less frequent than in the past      Prior Seizure Medications: Phenytoin, Lamotrigine, Divalproex, Zonisamide (weight loss/GI side effects)    His history was also obtained from his wife, who was present at today's visit  Objective:    Blood pressure 140/90, height 5' 11\" (1 803 m), weight 97 1 kg (214 lb)  Physical Exam    Neurological Exam      ROS:    Review of Systems   Constitutional: Negative  Negative for appetite change and fever  HENT: Negative  Negative for hearing loss, tinnitus, trouble swallowing and voice change  Eyes: Negative  Negative for photophobia, pain and visual disturbance  Respiratory: Negative  Negative for shortness of breath  Cardiovascular: Negative  Negative for palpitations  Gastrointestinal: Negative  Negative for nausea and vomiting  Endocrine: Negative  Negative for cold intolerance  Genitourinary: Negative  Negative for dysuria, frequency and urgency  Musculoskeletal: Negative  Negative for gait problem, myalgias and neck pain  Skin: Negative  Negative for rash  Allergic/Immunologic: Negative  Neurological: Negative  Negative for dizziness, tremors, seizures, syncope, facial asymmetry, speech difficulty, weakness, light-headedness, numbness and headaches  Hematological: Negative  Does not bruise/bleed easily  Psychiatric/Behavioral: Negative  Negative for confusion, hallucinations and sleep disturbance  All other systems reviewed and are negative        I personally reviewed the ROS that was entered by the medical assistant    Voice recognition software was used in the generation " of this note  There may be unintentional errors including grammatical errors, spelling errors, or pronoun errors

## 2023-05-16 NOTE — PATIENT INSTRUCTIONS
-- continue to take Levetiracetam and Oxcarbazepine unchanged       -- it will be important to continue to work with your psychologist

## 2023-05-22 NOTE — ASSESSMENT & PLAN NOTE
He has still been having some events concerning for seizures, but these are different than his prior events  Most recently he had an episode where he lost 2 hours of time, but has not had any other events concerning for generalized tonic-clonic seizures  He has noticed that his events have gotten better with working closely with a psychologist and being under less stress  At this point he is not interested in making any changes to his medications since he has been doing better  He will continue oxcarbazepine and levetiracetam unchanged  He did not previously tolerate the initiation of zonisamide, but if he would have additional events concerning for seizures, he may benefit from being started on a different medication  Alternatively, he would likely benefit from being admitted to the epilepsy monitoring unit to capture and characterize his events if he would be willing to do so

## 2023-07-14 ENCOUNTER — TELEPHONE (OUTPATIENT)
Age: 48
End: 2023-07-14

## 2023-07-14 NOTE — TELEPHONE ENCOUNTER
Pt has NEW insurance with [facility]- active July 1, 2023    ID#: [id number]  Group ID#: [id number]    Next appt is in Oct with Dr So Gu   He'll bring his NEW card in with him

## 2023-07-16 DIAGNOSIS — I10 ESSENTIAL HYPERTENSION: Chronic | ICD-10-CM

## 2023-07-16 RX ORDER — HYDROCHLOROTHIAZIDE 12.5 MG/1
TABLET ORAL
Qty: 90 TABLET | Refills: 1 | Status: SHIPPED | OUTPATIENT
Start: 2023-07-16

## 2023-08-18 NOTE — PLAN OF CARE
Problem: DISCHARGE PLANNING - CARE MANAGEMENT  Goal: Discharge to post-acute care or home with appropriate resources  INTERVENTIONS:  - Conduct assessment to determine patient/family and health care team treatment goals, and need for post-acute services based on payer coverage, community resources, and patient preferences, and barriers to discharge  - Address psychosocial, clinical, and financial barriers to discharge as identified in assessment in conjunction with the patient/family and health care team  - Arrange appropriate level of post-acute services according to patient's   needs and preference and payer coverage in collaboration with the physician and health care team  - Communicate with and update the patient/family, physician, and health care team regarding progress on the discharge plan  - Arrange appropriate transportation to post-acute venues  Outcome: Progressing  Cm met with pt at bedside  He lives with his wife Han Guo in a bi-level home with 7 steps w/ railing to enter the residence and 7 steps w/railing to reach the main living area  He normally has no problem navigating steps and takes care of all ADL's  He uses a glucometer  He has never been in rehab or used OP/PT  He did use HH services following heart surgery in 2015, but does not remember the name of the agency  He denies issues with drugs or alcohol  Denies hx of anxiety or depression  His PCP is Dr Stevan Ga  He does not have a POA or Advanced Directive and does not want info at this time  He uses [facility] and has no problem with his co-pays  He is retired, 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

## 2023-09-05 ENCOUNTER — TELEPHONE (OUTPATIENT)
Dept: NEUROLOGY | Facility: CLINIC | Age: 48
End: 2023-09-05

## 2023-09-05 NOTE — TELEPHONE ENCOUNTER
Pt called in to make a f/u appt. He is having blurred vision and is unable to walk. Pt was put on antidepressant Buspar he was on it for 4 weeks and symptoms started. He stopped taking it but is still having symptoms. Please assist. If pt does not answer please leave direct number.

## 2023-09-05 NOTE — TELEPHONE ENCOUNTER
Spoke to patient. Psychiatry put him on buspirone for depression. Started feeling "off" then began with blurred vision and difficulty walking - legs felt like jelly. Prescribed 5mg 3 times per day for anxiety/depression. Stopped medication about 3 weeks ago. Patient took naproxen for tooth pain and had similar symptoms. September 1st.    Symptoms are not constant, only when taking buspirone and then again returned when taking naproxen. Resolved at this time. Any recommendations for patient?

## 2023-09-06 NOTE — TELEPHONE ENCOUNTER
If the symptoms have mainly been when taking those medications, I would avoid them for now. Since the symptoms resolved, there likely isn't anything we need to do at this point unless the symptoms return. It would be important for him to discuss the symptoms with psychiatry to find an alternative to the buspirone.

## 2023-09-24 DIAGNOSIS — Z86.73 HISTORY OF CVA (CEREBROVASCULAR ACCIDENT): Chronic | ICD-10-CM

## 2023-09-24 RX ORDER — ATORVASTATIN CALCIUM 20 MG/1
TABLET, FILM COATED ORAL
Qty: 90 TABLET | Refills: 1 | Status: SHIPPED | OUTPATIENT
Start: 2023-09-24

## 2023-10-02 ENCOUNTER — TELEPHONE (OUTPATIENT)
Dept: NEUROLOGY | Facility: CLINIC | Age: 48
End: 2023-10-02

## 2023-10-02 ENCOUNTER — TELEMEDICINE (OUTPATIENT)
Dept: NEUROLOGY | Facility: CLINIC | Age: 48
End: 2023-10-02
Payer: COMMERCIAL

## 2023-10-02 DIAGNOSIS — G40.909 NONINTRACTABLE EPILEPSY WITHOUT STATUS EPILEPTICUS, UNSPECIFIED EPILEPSY TYPE (HCC): Chronic | ICD-10-CM

## 2023-10-02 DIAGNOSIS — G40.919 INTRACTABLE EPILEPSY WITHOUT STATUS EPILEPTICUS, UNSPECIFIED EPILEPSY TYPE (HCC): Primary | ICD-10-CM

## 2023-10-02 PROCEDURE — 99214 OFFICE O/P EST MOD 30 MIN: CPT | Performed by: PSYCHIATRY & NEUROLOGY

## 2023-10-02 RX ORDER — LEVETIRACETAM 500 MG/1
1500 TABLET ORAL EVERY 12 HOURS SCHEDULED
Qty: 180 TABLET | Refills: 11 | Status: SHIPPED | OUTPATIENT
Start: 2023-10-02

## 2023-10-02 RX ORDER — OXCARBAZEPINE 600 MG/1
TABLET, FILM COATED ORAL
Qty: 105 TABLET | Refills: 11 | Status: SHIPPED | OUTPATIENT
Start: 2023-10-02

## 2023-10-02 NOTE — TELEPHONE ENCOUNTER
Jai Cleary My Priority [facility] [place] Flex PPO    Dependent  [facility] [address] Name    Zahra Tayevicki    Scubscriber ID:    [id number]    Group#  [id number]    Plan#  [id number]

## 2023-10-02 NOTE — PATIENT INSTRUCTIONS
-- continue take Levetiracetam and Oxcarbazepine unchanged. -- Please get some blood work before your next appointment. Ideally, this would be first thing in the morning, before you take your morning medications.

## 2023-10-02 NOTE — TELEPHONE ENCOUNTER
Pt called in today stating that he is unable to get a ride to the appt today (10/02/23 at.- 1030am w/ismael). Offered Virtual visit instead- accepted.

## 2023-10-02 NOTE — ASSESSMENT & PLAN NOTE
He has not had any additional events concerning for seizures since 3/2/2023. Overall, his events became less frequent and resolved mainly after he started working with a psychologist and noting a significant decrease in his stress level. 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 -- at this point, he is tolerating his seizure medications well and not having any other events, so I would recommend he continue his medications unchanged. -- he is due to get some blood work, so I will order a CBC, CMP, Levetiracetam level, and Oxcarbazepine level     -- I also stressed the importance of continuing to follow with his mental health team for ongoing management of his mood and to help avoid additional events. -- he still does not feel comfortable driving. He is now >6 months from his most recent event, so we could fill out [place] [employer] forms to get his license back. He will be in touch with our office if he would like to pursue this.

## 2023-10-13 DIAGNOSIS — E11.51 TYPE 2 DIABETES MELLITUS WITH DIABETIC PERIPHERAL ANGIOPATHY WITHOUT GANGRENE, WITHOUT LONG-TERM CURRENT USE OF INSULIN (HCC): ICD-10-CM

## 2023-10-13 RX ORDER — SITAGLIPTIN 50 MG/1
TABLET, FILM COATED ORAL
Qty: 30 TABLET | Refills: 5 | Status: SHIPPED | OUTPATIENT
Start: 2023-10-13

## 2023-10-17 ENCOUNTER — TELEPHONE (OUTPATIENT)
Age: 48
End: 2023-10-17

## 2023-10-17 ENCOUNTER — OFFICE VISIT (OUTPATIENT)
Age: 48
End: 2023-10-17
Payer: COMMERCIAL

## 2023-10-17 ENCOUNTER — APPOINTMENT (OUTPATIENT)
Age: 48
End: 2023-10-17
Payer: COMMERCIAL

## 2023-10-17 VITALS
SYSTOLIC BLOOD PRESSURE: 134 MMHG | TEMPERATURE: 95.2 F | BODY MASS INDEX: 28.31 KG/M2 | RESPIRATION RATE: 18 BRPM | DIASTOLIC BLOOD PRESSURE: 78 MMHG | WEIGHT: 203 LBS | HEART RATE: 77 BPM | OXYGEN SATURATION: 97 %

## 2023-10-17 DIAGNOSIS — N18.31 HYPERTENSIVE KIDNEY DISEASE WITH STAGE 3A CHRONIC KIDNEY DISEASE (HCC): Chronic | ICD-10-CM

## 2023-10-17 DIAGNOSIS — G40.909 NONINTRACTABLE EPILEPSY WITHOUT STATUS EPILEPTICUS, UNSPECIFIED EPILEPSY TYPE (HCC): Chronic | ICD-10-CM

## 2023-10-17 DIAGNOSIS — I12.9 HYPERTENSIVE KIDNEY DISEASE WITH STAGE 3A CHRONIC KIDNEY DISEASE (HCC): Chronic | ICD-10-CM

## 2023-10-17 DIAGNOSIS — E11.51 TYPE 2 DIABETES MELLITUS WITH DIABETIC PERIPHERAL ANGIOPATHY WITHOUT GANGRENE, WITHOUT LONG-TERM CURRENT USE OF INSULIN (HCC): ICD-10-CM

## 2023-10-17 DIAGNOSIS — G40.919 INTRACTABLE EPILEPSY WITHOUT STATUS EPILEPTICUS, UNSPECIFIED EPILEPSY TYPE (HCC): ICD-10-CM

## 2023-10-17 DIAGNOSIS — E11.51 TYPE 2 DIABETES MELLITUS WITH DIABETIC PERIPHERAL ANGIOPATHY WITHOUT GANGRENE, WITHOUT LONG-TERM CURRENT USE OF INSULIN (HCC): Primary | ICD-10-CM

## 2023-10-17 LAB
ALBUMIN SERPL BCP-MCNC: 4.3 G/DL (ref 3.5–5)
ALP SERPL-CCNC: 64 U/L (ref 34–104)
ALT SERPL W P-5'-P-CCNC: 20 U/L (ref 7–52)
ANION GAP SERPL CALCULATED.3IONS-SCNC: 8 MMOL/L
AST SERPL W P-5'-P-CCNC: 23 U/L (ref 13–39)
BILIRUB SERPL-MCNC: 0.49 MG/DL (ref 0.2–1)
BUN SERPL-MCNC: 12 MG/DL (ref 5–25)
CALCIUM SERPL-MCNC: 9.2 MG/DL (ref 8.4–10.2)
CHLORIDE SERPL-SCNC: 103 MMOL/L (ref 96–108)
CHOLEST SERPL-MCNC: 73 MG/DL
CO2 SERPL-SCNC: 31 MMOL/L (ref 21–32)
CREAT SERPL-MCNC: 1.2 MG/DL (ref 0.6–1.3)
ERYTHROCYTE [DISTWIDTH] IN BLOOD BY AUTOMATED COUNT: 12.9 % (ref 11.6–15.1)
EST. AVERAGE GLUCOSE BLD GHB EST-MCNC: 117 MG/DL
GFR SERPL CREATININE-BSD FRML MDRD: 72 ML/MIN/1.73SQ M
GLUCOSE P FAST SERPL-MCNC: 97 MG/DL (ref 65–99)
HBA1C MFR BLD: 5.7 %
HCT VFR BLD AUTO: 46.5 % (ref 36.5–49.3)
HDLC SERPL-MCNC: 40 MG/DL
HGB BLD-MCNC: 15.4 G/DL (ref 12–17)
LDLC SERPL CALC-MCNC: 23 MG/DL (ref 0–100)
LEFT EYE DIABETIC RETINOPATHY: NORMAL
LEFT EYE IMAGE QUALITY: NORMAL
LEFT EYE MACULAR EDEMA: NORMAL
LEFT EYE OTHER RETINOPATHY: NORMAL
MCH RBC QN AUTO: 28.9 PG (ref 26.8–34.3)
MCHC RBC AUTO-ENTMCNC: 33.1 G/DL (ref 31.4–37.4)
MCV RBC AUTO: 87 FL (ref 82–98)
PLATELET # BLD AUTO: 111 THOUSANDS/UL (ref 149–390)
PMV BLD AUTO: 13.2 FL (ref 8.9–12.7)
POTASSIUM SERPL-SCNC: 3.8 MMOL/L (ref 3.5–5.3)
PROT SERPL-MCNC: 6.9 G/DL (ref 6.4–8.4)
RBC # BLD AUTO: 5.32 MILLION/UL (ref 3.88–5.62)
RIGHT EYE DIABETIC RETINOPATHY: NORMAL
RIGHT EYE IMAGE QUALITY: NORMAL
RIGHT EYE MACULAR EDEMA: NORMAL
RIGHT EYE OTHER RETINOPATHY: NORMAL
SEVERITY (EYE EXAM): NORMAL
SODIUM SERPL-SCNC: 142 MMOL/L (ref 135–147)
TRIGL SERPL-MCNC: 48 MG/DL
WBC # BLD AUTO: 7.13 THOUSAND/UL (ref 4.31–10.16)

## 2023-10-17 PROCEDURE — 80061 LIPID PANEL: CPT

## 2023-10-17 PROCEDURE — 99214 OFFICE O/P EST MOD 30 MIN: CPT | Performed by: INTERNAL MEDICINE

## 2023-10-17 PROCEDURE — 80183 DRUG SCRN QUANT OXCARBAZEPIN: CPT

## 2023-10-17 PROCEDURE — 83036 HEMOGLOBIN GLYCOSYLATED A1C: CPT

## 2023-10-17 PROCEDURE — 80177 DRUG SCRN QUAN LEVETIRACETAM: CPT

## 2023-10-17 PROCEDURE — 80053 COMPREHEN METABOLIC PANEL: CPT

## 2023-10-17 PROCEDURE — 85027 COMPLETE CBC AUTOMATED: CPT

## 2023-10-17 PROCEDURE — 92250 FUNDUS PHOTOGRAPHY W/I&R: CPT | Performed by: INTERNAL MEDICINE

## 2023-10-17 PROCEDURE — 36415 COLL VENOUS BLD VENIPUNCTURE: CPT

## 2023-10-17 RX ORDER — TRIAMCINOLONE ACETONIDE 1 MG/G
OINTMENT TOPICAL
COMMUNITY
Start: 2023-09-11

## 2023-10-17 RX ORDER — FLUOCINOLONE ACETONIDE 0.11 MG/ML
OIL TOPICAL
COMMUNITY
Start: 2023-09-11

## 2023-10-17 NOTE — TELEPHONE ENCOUNTER
----- Message from Marielena Rosales DO sent at 10/17/2023  9:43 AM EDT -----  Please let patient know there was no evidence of diabetic retinopathy on his eye exam from our office

## 2023-10-17 NOTE — ASSESSMENT & PLAN NOTE
Lab Units 04/11/23  0929 10/06/22  0929 08/25/22  1402   CREATININE mg/dL 1.44* 1.35* 1.57*   EGFR ml/min/1.73sq m 58 62 52     Patient's creatinine has been between 1.3-1.5 mg/dL in the past year. GFR has been between 52-62. Stay well hydrated. Avoid nephrotoxins. Keep blood pressure controlled. Will not make any medication changes at this time. Can consider use of an SGLT2 inhibitor in the future if he would like.  He has comprehensive metabolic panel to check for his neurologist.

## 2023-10-17 NOTE — TELEPHONE ENCOUNTER
----- Message from Jamil Butler DO sent at 10/17/2023  9:43 AM EDT -----  Please let patient know there was no evidence of diabetic retinopathy on his eye exam from our office

## 2023-10-17 NOTE — ASSESSMENT & PLAN NOTE
Patient has been doing well from a seizure standpoint. He is very happy with this. Continue antiseizure medications as prescribed.  Check up to date labs as ordered by Dr. Emerson Jacques, his epileptologist.

## 2023-10-17 NOTE — PROGRESS NOTES
Name: Shannen Bosch      : 1975      MRN: [id number]  Encounter Provider: Akin Camargo DO  Encounter Date: 10/17/2023   Encounter department: [address]     1. Type 2 diabetes mellitus with diabetic peripheral angiopathy without gangrene, without long-term current use of insulin ([address])  Assessment & Plan:  Patient diabetes is generally very well controlled. His A1c has not been in the diabetic range for a while. He is tolerating Januvia well and we did discuss potentially switching to an SGLT2 inhibitor to help from a kidney protection standpoint. Would like to leave things alone at this time and we'll repeat lab work. Continue to stay physically active and eat a healthy, well balanced diet. We'll check diabetic eye exam in the office today. Orders:  -     IRIS Diabetic eye exam  -     Hemoglobin A1C; Future; Expected date: 10/17/2023  -     Lipid Panel with Direct LDL reflex; Future; Expected date: 10/17/2023    2. Hypertensive kidney disease with stage 3a chronic kidney disease [facility])  Assessment & Plan:      Lab Units 23  0929 10/06/22  0929 22  1402   CREATININE mg/dL 1.44* 1.35* 1.57*   EGFR ml/min/1.73sq m 58 62 52     Patient's creatinine has been between 1.3-1.5 mg/dL in the past year. GFR has been between 52-62. Stay well hydrated. Avoid nephrotoxins. Keep blood pressure controlled. Will not make any medication changes at this time. Can consider use of an SGLT2 inhibitor in the future if he would like. He has comprehensive metabolic panel to check for his neurologist.       3. Intractable epilepsy without status epilepticus, unspecified epilepsy type [facility])  Assessment & Plan:  Patient has been doing well from a seizure standpoint. He is very happy with this. Continue antiseizure medications as prescribed.  Check up to date labs as ordered by Dr. Ritu Melgar, his epileptologist.           Depression Screening and Follow-up Plan: Patient was screened for depression during today's encounter. They screened negative with a PHQ-2 score of 0. Return in about 6 months (around 4/17/2024) for Follow-up. Subjective      La Mauricio, a 53-year-old male patient who presents today for 6-month follow-up. He has a known history of type 2 diabetes mellitus, peripheral artery disease, hypertension, epilepsy, stroke. Epilepsy: For his epilepsy, he follows regularly with Dr. Lu Gallo of [facility]. He was last seen for a telemedicine visit on 10/02/2023. Patient has not had any significant events concerning for seizures since 03/2023. He is tolerating medication well. Dr. Lu Gallo is monitoring his lab work and seizure drug levels. Today, the patient feels better. He states that the stress of losing his house and his children might have been the cause of his condition. Taking the prescribed medications from the psychiatrist made it even worse as he experienced episodes of losing consciousness and could not even walk. He had to be carried by his wife to the stairs. After the treatment regimen, he felt better and was able to go back to work at [employer]. Type 2 diabetes mellitus:  Clinically, patient's diabetes is very well controlled. He is only taking Januvia 50 mg a day. He is filling the medication regularly according to dispense data from [facility]. He had his diabetic eye exam a year ago. He reported that his weight is well balanced at 200 lbs now that he is back moving and working. Hypertension:  He took his blood pressure at home the night before and it was 120/85 mmHg. Review of systems:  The pertinent positive and negative findings are as noted in the HPI.     Objective     /78 (BP Location: Left arm, Patient Position: Sitting, Cuff Size: Standard)   Pulse 77   Temp (!) 95.2 °F (35.1 °C) (Tympanic)   Resp 18   Wt 92.1 kg (203 lb)   SpO2 97%   BMI 28.31 kg/m²     Physical Exam  Constitutional:       General: He is not in acute distress. Appearance: He is not ill-appearing. Cardiovascular:      Rate and Rhythm: Normal rate and regular rhythm. Heart sounds: No murmur heard. Pulmonary:      Effort: Pulmonary effort is normal. No respiratory distress. Breath sounds: No wheezing. Abdominal:      General: Bowel sounds are normal. There is no distension. Tenderness: There is no abdominal tenderness. Musculoskeletal:      Right lower leg: No edema. Left lower leg: No edema. Neurological:      Mental Status: He is alert. Results:  Patient's last A1c was checked on 4/11/2023 and it was in the normal range at 5.6%. Patient's creatinine has been between 1.3-1.5 mg/dL in the past year. GFR has been between 52-62. Transcribed for Abner Diaz DO, by Danielle Fitch on 10/17/23 at 8:32 AM. Powered by [employer].

## 2023-10-17 NOTE — ASSESSMENT & PLAN NOTE
Patient diabetes is generally very well controlled. His A1c has not been in the diabetic range for a while. He is tolerating Januvia well and we did discuss potentially switching to an SGLT2 inhibitor to help from a kidney protection standpoint. Would like to leave things alone at this time and we'll repeat lab work. Continue to stay physically active and eat a healthy, well balanced diet. We'll check diabetic eye exam in the office today.

## 2023-10-18 ENCOUNTER — TELEPHONE (OUTPATIENT)
Age: 48
End: 2023-10-18

## 2023-10-18 NOTE — TELEPHONE ENCOUNTER
----- Message from Marielena Rosales DO sent at 10/18/2023  8:40 AM EDT -----  A1c remains excellent at 5.7%.  Cholesterol is normal.

## 2023-10-18 NOTE — TELEPHONE ENCOUNTER
----- Message from Johanna Torres DO sent at 10/18/2023  9:48 AM EDT -----  I would still continue the blood sugar medication to make sure the diabetes stays very well controlled. It's a low dose that he is on.    ----- Message -----  From: Zully Wheatfield  Sent: 10/18/2023   9:16 AM EDT  To: Johanna Torres DO    Pt wants to know if he should continue Blood sugar medication since you had mention that is diabetics is in remittion.        ----- Message -----  From: Johanna Torres DO  Sent: 10/18/2023   8:40 AM EDT  To: [facility]    A1c remains excellent at 5.7%.  Cholesterol is normal.

## 2023-10-19 LAB — LEVETIRACETAM SERPL-MCNC: 42.2 UG/ML (ref 10–40)

## 2023-10-20 LAB — OXCARBAZEPINE SERPL-MCNC: 31 UG/ML (ref 10–35)

## 2023-11-05 DIAGNOSIS — I10 ESSENTIAL HYPERTENSION: ICD-10-CM

## 2023-11-05 RX ORDER — AMLODIPINE BESYLATE 5 MG/1
TABLET ORAL
Qty: 90 TABLET | Refills: 1 | Status: SHIPPED | OUTPATIENT
Start: 2023-11-05

## 2023-11-05 RX ORDER — LISINOPRIL 40 MG/1
TABLET ORAL
Qty: 90 TABLET | Refills: 1 | Status: SHIPPED | OUTPATIENT
Start: 2023-11-05

## 2023-11-12 DIAGNOSIS — I10 ESSENTIAL HYPERTENSION: Chronic | ICD-10-CM

## 2023-11-12 RX ORDER — HYDROCHLOROTHIAZIDE 12.5 MG/1
TABLET ORAL
Qty: 90 TABLET | Refills: 1 | Status: SHIPPED | OUTPATIENT
Start: 2023-11-12

## 2024-01-05 ENCOUNTER — OFFICE VISIT (OUTPATIENT)
Age: 49
End: 2024-01-05
Payer: COMMERCIAL

## 2024-01-05 ENCOUNTER — APPOINTMENT (OUTPATIENT)
Age: 49
End: 2024-01-05
Payer: COMMERCIAL

## 2024-01-05 VITALS
HEART RATE: 70 BPM | HEIGHT: 71 IN | DIASTOLIC BLOOD PRESSURE: 78 MMHG | BODY MASS INDEX: 29.71 KG/M2 | SYSTOLIC BLOOD PRESSURE: 130 MMHG | RESPIRATION RATE: 18 BRPM | TEMPERATURE: 97 F | WEIGHT: 212.2 LBS | OXYGEN SATURATION: 95 %

## 2024-01-05 DIAGNOSIS — R35.0 URINARY FREQUENCY: Primary | ICD-10-CM

## 2024-01-05 DIAGNOSIS — G40.919 INTRACTABLE EPILEPSY WITHOUT STATUS EPILEPTICUS, UNSPECIFIED EPILEPSY TYPE (HCC): ICD-10-CM

## 2024-01-05 DIAGNOSIS — D69.6 THROMBOCYTOPENIA (HCC): ICD-10-CM

## 2024-01-05 DIAGNOSIS — R35.0 URINARY FREQUENCY: ICD-10-CM

## 2024-01-05 DIAGNOSIS — R39.9 UTI SYMPTOMS: ICD-10-CM

## 2024-01-05 DIAGNOSIS — E11.51 TYPE 2 DIABETES MELLITUS WITH DIABETIC PERIPHERAL ANGIOPATHY WITHOUT GANGRENE, WITHOUT LONG-TERM CURRENT USE OF INSULIN (HCC): ICD-10-CM

## 2024-01-05 LAB
ALBUMIN SERPL BCP-MCNC: 4.3 G/DL (ref 3.5–5)
ALP SERPL-CCNC: 85 U/L (ref 34–104)
ALT SERPL W P-5'-P-CCNC: 29 U/L (ref 7–52)
ANION GAP SERPL CALCULATED.3IONS-SCNC: 9 MMOL/L
AST SERPL W P-5'-P-CCNC: 25 U/L (ref 13–39)
BASOPHILS # BLD AUTO: 0.05 THOUSANDS/ÂΜL (ref 0–0.1)
BASOPHILS NFR BLD AUTO: 1 % (ref 0–1)
BILIRUB SERPL-MCNC: 0.41 MG/DL (ref 0.2–1)
BILIRUB UR QL STRIP: NEGATIVE
BUN SERPL-MCNC: 15 MG/DL (ref 5–25)
CALCIUM SERPL-MCNC: 9.3 MG/DL (ref 8.4–10.2)
CHLORIDE SERPL-SCNC: 102 MMOL/L (ref 96–108)
CLARITY UR: CLEAR
CO2 SERPL-SCNC: 30 MMOL/L (ref 21–32)
COLOR UR: NORMAL
CREAT SERPL-MCNC: 1.24 MG/DL (ref 0.6–1.3)
CREAT UR-MCNC: 113.2 MG/DL
EOSINOPHIL # BLD AUTO: 0.3 THOUSAND/ÂΜL (ref 0–0.61)
EOSINOPHIL NFR BLD AUTO: 3 % (ref 0–6)
ERYTHROCYTE [DISTWIDTH] IN BLOOD BY AUTOMATED COUNT: 13.4 % (ref 11.6–15.1)
EST. AVERAGE GLUCOSE BLD GHB EST-MCNC: 120 MG/DL
GFR SERPL CREATININE-BSD FRML MDRD: 69 ML/MIN/1.73SQ M
GLUCOSE P FAST SERPL-MCNC: 100 MG/DL (ref 65–99)
GLUCOSE UR STRIP-MCNC: NEGATIVE MG/DL
HBA1C MFR BLD: 5.8 %
HCT VFR BLD AUTO: 48.6 % (ref 36.5–49.3)
HGB BLD-MCNC: 16.1 G/DL (ref 12–17)
HGB UR QL STRIP.AUTO: NEGATIVE
IMM GRANULOCYTES # BLD AUTO: 0.03 THOUSAND/UL (ref 0–0.2)
IMM GRANULOCYTES NFR BLD AUTO: 0 % (ref 0–2)
KETONES UR STRIP-MCNC: NEGATIVE MG/DL
LEUKOCYTE ESTERASE UR QL STRIP: NEGATIVE
LYMPHOCYTES # BLD AUTO: 3.02 THOUSANDS/ÂΜL (ref 0.6–4.47)
LYMPHOCYTES NFR BLD AUTO: 30 % (ref 14–44)
MCH RBC QN AUTO: 29.1 PG (ref 26.8–34.3)
MCHC RBC AUTO-ENTMCNC: 33.1 G/DL (ref 31.4–37.4)
MCV RBC AUTO: 88 FL (ref 82–98)
MICROALBUMIN UR-MCNC: 7.8 MG/L
MICROALBUMIN/CREAT 24H UR: 7 MG/G CREATININE (ref 0–30)
MONOCYTES # BLD AUTO: 0.69 THOUSAND/ÂΜL (ref 0.17–1.22)
MONOCYTES NFR BLD AUTO: 7 % (ref 4–12)
NEUTROPHILS # BLD AUTO: 6.04 THOUSANDS/ÂΜL (ref 1.85–7.62)
NEUTS SEG NFR BLD AUTO: 59 % (ref 43–75)
NITRITE UR QL STRIP: NEGATIVE
NRBC BLD AUTO-RTO: 0 /100 WBCS
PH UR STRIP.AUTO: 6.5 [PH]
PLATELET # BLD AUTO: 123 THOUSANDS/UL (ref 149–390)
PMV BLD AUTO: 11.8 FL (ref 8.9–12.7)
POTASSIUM SERPL-SCNC: 3.7 MMOL/L (ref 3.5–5.3)
PROT SERPL-MCNC: 6.8 G/DL (ref 6.4–8.4)
PROT UR STRIP-MCNC: NEGATIVE MG/DL
RBC # BLD AUTO: 5.53 MILLION/UL (ref 3.88–5.62)
SODIUM SERPL-SCNC: 141 MMOL/L (ref 135–147)
SP GR UR STRIP.AUTO: 1.02 (ref 1–1.03)
UROBILINOGEN UR STRIP-ACNC: <2 MG/DL
WBC # BLD AUTO: 10.13 THOUSAND/UL (ref 4.31–10.16)

## 2024-01-05 PROCEDURE — 82570 ASSAY OF URINE CREATININE: CPT

## 2024-01-05 PROCEDURE — 80053 COMPREHEN METABOLIC PANEL: CPT

## 2024-01-05 PROCEDURE — 82043 UR ALBUMIN QUANTITATIVE: CPT

## 2024-01-05 PROCEDURE — 83036 HEMOGLOBIN GLYCOSYLATED A1C: CPT

## 2024-01-05 PROCEDURE — 36415 COLL VENOUS BLD VENIPUNCTURE: CPT

## 2024-01-05 PROCEDURE — 99214 OFFICE O/P EST MOD 30 MIN: CPT | Performed by: INTERNAL MEDICINE

## 2024-01-05 PROCEDURE — 85025 COMPLETE CBC W/AUTO DIFF WBC: CPT

## 2024-01-05 PROCEDURE — 81003 URINALYSIS AUTO W/O SCOPE: CPT

## 2024-01-05 NOTE — PROGRESS NOTES
Name: Dale Todd      : 1975      MRN: [id number]  Encounter Provider: Derick Gurrola DO  Encounter Date: 2024   Encounter department: [facility] [place]    Assessment & Plan     1. Urinary frequency    Will evaluate for underlying UTI. No new medications that patient started on. Sounds like his blood sugars have been fine at home, but will still check additional blood tests as below.    - UA w Reflex to Microscopic w Reflex to Culture; Future    2. Intractable epilepsy without status epilepticus, unspecified epilepsy type ([facility])    Stable and follows with seizure specialist.    3. Type 2 diabetes mellitus with diabetic peripheral angiopathy without gangrene, without long-term current use of insulin ([facility])    Most recent A1c was 5.7 % on 10/17/2023. Continue medications as prescribed. Check updated A1c.    - Hemoglobin A1C; Future  - Comprehensive metabolic panel; Future  - Albumin / creatinine urine ratio; Future    4. Thrombocytopenia ([facility])    Check updated platelet count. No bleeding episodes.    - CBC and differential; Future    5. UTI symptoms  - UA w Reflex to Microscopic w Reflex to Culture; Future        Subjective      Concerned about frequency of urination. Felt when urinating that something wanted to push itself out. Has been going on past couple weeks. That sensation where he wanted to push something is now gone. States he is going every 4 hours. That is with him holding it. He states if he wasn't holding it, he would be going every 2 hours. This is a change for him. Blood sugars at home have been normal. Most recent A1c was 5.7 % on 10/17/2023.     Review of Systems   Constitutional: Negative.    Respiratory: Negative.     Cardiovascular: Negative.    Gastrointestinal: Negative.    Genitourinary:  Positive for frequency and urgency. Negative for decreased urine volume, dysuria and flank pain.     Objective     /78 (BP Location: Left arm, Patient Position: Sitting,  "Cuff Size: Large)   Pulse 70   Temp (!) 97 °F (36.1 °C) (Tympanic)   Resp 18   Ht 5' 11\" (1.803 m)   Wt 96.3 kg (212 lb 3.2 oz)   SpO2 95%   BMI 29.60 kg/m²     Physical Exam  Constitutional:       General: He is not in acute distress.     Appearance: He is not ill-appearing.   Cardiovascular:      Rate and Rhythm: Normal rate and regular rhythm.      Heart sounds: No murmur heard.  Pulmonary:      Effort: Pulmonary effort is normal. No respiratory distress.      Breath sounds: No wheezing.   Abdominal:      General: Bowel sounds are normal. There is no distension.      Tenderness: There is no abdominal tenderness.   Musculoskeletal:      Right lower leg: No edema.      Left lower leg: No edema.   Neurological:      Mental Status: He is alert.       Derick Gurrola DO    "

## 2024-01-27 NOTE — PATIENT INSTRUCTIONS
-- Please increase your Zonisamide to be 300 mg nightly    -- Continue your other medications unchanged for now  If those same symptoms return with the increase in Zonisamide, then call our office and we could consider decreasing the dose of one of your other medications 
No

## 2024-02-01 DIAGNOSIS — Z86.73 HISTORY OF CVA (CEREBROVASCULAR ACCIDENT): Chronic | ICD-10-CM

## 2024-02-01 RX ORDER — ATORVASTATIN CALCIUM 20 MG/1
TABLET, FILM COATED ORAL
Qty: 90 TABLET | Refills: 3 | Status: SHIPPED | OUTPATIENT
Start: 2024-02-01

## 2024-04-01 ENCOUNTER — TELEPHONE (OUTPATIENT)
Dept: NEUROLOGY | Facility: CLINIC | Age: 49
End: 2024-04-01

## 2024-04-02 ENCOUNTER — TELEMEDICINE (OUTPATIENT)
Dept: NEUROLOGY | Facility: CLINIC | Age: 49
End: 2024-04-02
Payer: COMMERCIAL

## 2024-04-02 DIAGNOSIS — G40.109 LOCALIZATION-RELATED EPILEPSY (HCC): Primary | ICD-10-CM

## 2024-04-02 PROCEDURE — 99213 OFFICE O/P EST LOW 20 MIN: CPT | Performed by: NURSE PRACTITIONER

## 2024-04-02 RX ORDER — LEVETIRACETAM 500 MG/1
TABLET ORAL
Qty: 120 TABLET | Refills: 11 | Status: SHIPPED | OUTPATIENT
Start: 2024-04-02

## 2024-04-02 RX ORDER — OXCARBAZEPINE 600 MG/1
TABLET, FILM COATED ORAL
Qty: 105 TABLET | Refills: 11 | Status: SHIPPED | OUTPATIENT
Start: 2024-04-02

## 2024-04-02 NOTE — PATIENT INSTRUCTIONS
- Decrease levetiracetam (keppra) 500 mg tablets: Take 2 tablets by mouth twice per day for one month, then decrease to 1.5 tablets by mouth twice per day and continue this dose until follow up  - Continue current dose of oxcarbazepine 900 mg in the morning and 1200 mg at night  - Let us know if there are seizures or concerns. If there are we will want to go back up on the levetiracetam (keppra)  - Follow up in 3-4 months with Dr Guzmán

## 2024-04-02 NOTE — PROGRESS NOTES
Patient ID: Dale Todd is a 48 y.o. 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      Virtual Regular Visit    Verification of patient location: home    Patient is located at Home in the following state in which I hold an active license [place]      Assessment/Plan:    Problem List Items Addressed This Visit       Localization-related epilepsy (HCC) - Primary     Recent resolution of events over the prior year after working with a therapist and having a significant decrease in stress. Prior to this, events were occurring frequently. At his last visit, there was some concern that his events were non-epileptic given resolution after stress improvement and working with a therapist. Prior EEGs were unrevealing. He is currently on levetiracetam 1500 mg BID and oxcarbazepine. He is interested in making changes to his medication given he has not had events and feels that he is having some side effects.     Since stress is much better and he continues to do well. We will attempt to decrease his levetiracetam with goal of weaning off. This medication may contribute to mood worsening. Plan to decrease to 750 mg BID over the next month and he will continue this dose until follow up. Continue oxcarbazepine unchanged as this medication may be mood stabilizing.     Discussed possibility of breakthrough seizures with medication weaning. He is not back to driving yet though he does have an active drivers license. Recommended holding off on return to driving while we are making medication adjustments which he is agreeable to. He will call the office with seizures, issues or concerns. Follow up in 3-4 months or sooner if needed with Dr Guzmán.          Relevant Medications    levETIRAcetam (KEPPRA) 500 mg tablet    OXcarbazepine (TRILEPTAL) 600 mg tablet            Reason  for visit is   Chief Complaint   Patient presents with    Virtual Regular Visit          Encounter provider AR Arcos    Provider located at [address] [place] [address] [facility] [address] [address]      Recent Visits  Date Type Provider Dept   04/01/24 Telephone Naomy Bryandonaldolianna [facility]   Showing recent visits within past 7 days and meeting all other requirements  Today's Visits  Date Type Provider Dept   04/02/24 Telemedicine AR [facility]cos  [facility]   Showing today's visits and meeting all other requirements  Future Appointments  No visits were found meeting these conditions.  Showing future appointments within next 150 days and meeting all other requirements       The patient was identified by name and date of birth. Dale Todd was informed that this is a telemedicine visit and that the visit is being conducted through the [employer] platform. He agrees to proceed..  My office door was closed. No one else was in the room.  He acknowledged consent and understanding of privacy and security of the video platform. The patient has agreed to participate and understands they can discontinue the visit at any time.    Patient is aware this is a billable service.     Subjective  Dale Todd is a 48 y.o. 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 He was last seen via telemedicine by Dr Guzmán on 10/2/2023. At that time, there were no additional events concerning for seizure since 3/2/2023, events became less frequent and resolved after he starting working with a psychologist and noted a significant decrease in his stress level. 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 Since he was tolerating medications well without recurrent events, recommended continuing current regimen at that time and checking blood work. He was to continue following with his mental health team. Noted that once he was ready, paperwork could be completed and sent to [facility] to support his return to driving. Recommended 6 month follow up.     Since his last visit, he has continued to be seizure free. He is interested in coming off of medication or decreasing medications. He does feel that he has some side effects of his medications. Occasional loopiness/ dizziness. May impact his daily functioning. He is not back to driving yet. He went to the [facility] and his license is active. He is concerned because of the medication to start driving because of the medication.     He was having night sweats and started drinking more water and not longer having night sweats. There are vivid realistic dreams every night, this is unchanged. This started after his first seizure.     His therapist left so he no longer is in therapy. Feels like he is doing well. He did learn a lot. Stress is better now. Seizures seem to have been in the setting of high stress in the past. Diabetes has been well controlled.     He is working on finding a job.     Current seizure medications:  - levetiracetam 1500 mg BID  - oxcarbazepine 900-1200  Other medications as per Epic.   Latest Reference Range & Units 01/05/24 13:25   Sodium 135 - 147 mmol/L 141      Latest Reference Range & Units 10/17/23 09:04   LEVETIRACETA (KEPPRA) 10.0 - 40.0 ug/mL 42.2 (H)   OXCARBAZEPINE 10 - 35 ug/mL 31         Prior Seizure Medications: Phenytoin, Lamotrigine, Divalproex, Zonisamide (weight loss/GI side effects)        I reviewed prior neurology notes, most recent labs, as documented in  Epic/[facility], and summarized above.     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    Seizure (HCC)     possible, unclear history    Stroke (HCC)     TIA (transient ischemic attack)     Possible       Past Surgical History:   Procedure Laterality Date    APPENDECTOMY      CARDIAC SURGERY      ROTATOR CUFF REPAIR         Current Outpatient Medications   Medication Sig Dispense Refill    amLODIPine (NORVASC) 5 mg tablet TAKE 1 TABLET BY MOUTH EVERY DAY 90 tablet 1    atorvastatin (LIPITOR) 20 mg tablet TAKE 1 TABLET BY MOUTH EVERY DAY 90 tablet 3    clobetasol (TEMOVATE) 0.05 % external solution APPLY 5 DROPS TO PSORIASIS ON SCALP TWICE A DAY 2-3 DAYS A WEEK AS NEEDED FOR FLARE-UPS. AVOID FACE      Fluocinolone Acetonide Body 0.01 % OIL APPLY ENOUGH TO COVER TOPICALLY DAILY DURING THE DAY.      hydrochlorothiazide (HYDRODIURIL) 12.5 mg tablet TAKE 1 TABLET BY MOUTH EVERY DAY 90 tablet 1    Januvia 50 MG tablet TAKE 1 TABLET BY MOUTH EVERY DAY 30 tablet 5    Lancets (FREESTYLE) lancets Use as instructed 100 each 0    levETIRAcetam (KEPPRA) 500 mg tablet Take 2 tablets by mouth twice per day for one month, then decrease to 1.5 tablets by mouth twice per day. 120 tablet 11    lisinopril (ZESTRIL) 40 mg tablet TAKE 1 TABLET BY MOUTH EVERY DAY 90 tablet 1    OneTouch Ultra test strip CHECK BLOOD SUGARS DAILY 100 strip 15    OXcarbazepine (TRILEPTAL) 600 mg tablet Take 1.5 tabs (900 mg) in the morning and 2 tabs (1200 mg) nightly. 105 tablet 11    triamcinolone (KENALOG) 0.1 % ointment APPLY PEA-SIZED AMOUNT TOPICALLY DAILY AT NIGHT AND IN THE MORNING.       No current facility-administered medications for this visit.        Allergies   Allergen Reactions    Cat Hair Extract Allergic Rhinitis    Metformin Vomiting     Shellfish-Derived Products - Food Allergy Itching     Tickling on the back of the throat    Lamotrigine Hives and Rash     burning    Penicillins Rash       Review of Systems  Review of Systems   Constitutional:  Negative for appetite change, fatigue and fever.   HENT: Negative.  Negative for hearing loss, tinnitus, trouble swallowing and voice change.    Eyes: Negative.  Negative for photophobia, pain and visual disturbance.   Respiratory: Negative.  Negative for shortness of breath.    Cardiovascular: Negative.  Negative for palpitations.   Gastrointestinal: Negative.  Negative for nausea and vomiting.   Endocrine: Negative.  Negative for cold intolerance.   Genitourinary: Negative.  Negative for dysuria, frequency and urgency.   Musculoskeletal:  Negative for back pain, gait problem, myalgias, neck pain and neck stiffness.   Skin: Negative.  Negative for rash.   Allergic/Immunologic: Negative.    Neurological:  Positive for dizziness. Negative for tremors, seizures, syncope, facial asymmetry, speech difficulty, weakness, light-headedness, numbness and headaches.   Hematological: Negative.  Does not bruise/bleed easily.   Psychiatric/Behavioral: Negative.  Negative for confusion, hallucinations and sleep disturbance.    All other systems reviewed and are negative.     ROS obtained by MA and reviewed by myself.   Video Exam    There were no vitals filed for this visit.    Physical Exam     Visit Time  Total Visit Duration: 23 minutes    This note may have been created using voice recognition software. There may be unintentional errors such as grammatical errors, spelling errors, or pronoun errors.

## 2024-04-02 NOTE — ASSESSMENT & PLAN NOTE
Recent resolution of events over the prior year after working with a therapist and having a significant decrease in stress. Prior to this, events were occurring frequently. At his last visit, there was some concern that his events were non-epileptic given resolution after stress improvement and working with a therapist. Prior EEGs were unrevealing. He is currently on levetiracetam 1500 mg BID and oxcarbazepine. He is interested in making changes to his medication given he has not had events and feels that he is having some side effects.     Since stress is much better and he continues to do well. We will attempt to decrease his levetiracetam with goal of weaning off. This medication may contribute to mood worsening. Plan to decrease to 750 mg BID over the next month and he will continue this dose until follow up. Continue oxcarbazepine unchanged as this medication may be mood stabilizing.     Discussed possibility of breakthrough seizures with medication weaning. He is not back to driving yet though he does have an active drivers license. Recommended holding off on return to driving while we are making medication adjustments which he is agreeable to. He will call the office with seizures, issues or concerns. Follow up in 3-4 months or sooner if needed with Dr Guzmán.

## 2024-04-02 NOTE — PROGRESS NOTES
Review of Systems   Constitutional:  Negative for appetite change, fatigue and fever.   HENT: Negative.  Negative for hearing loss, tinnitus, trouble swallowing and voice change.    Eyes: Negative.  Negative for photophobia, pain and visual disturbance.   Respiratory: Negative.  Negative for shortness of breath.    Cardiovascular: Negative.  Negative for palpitations.   Gastrointestinal: Negative.  Negative for nausea and vomiting.   Endocrine: Negative.  Negative for cold intolerance.   Genitourinary: Negative.  Negative for dysuria, frequency and urgency.   Musculoskeletal:  Negative for back pain, gait problem, myalgias, neck pain and neck stiffness.   Skin: Negative.  Negative for rash.   Allergic/Immunologic: Negative.    Neurological:  Positive for dizziness. Negative for tremors, seizures, syncope, facial asymmetry, speech difficulty, weakness, light-headedness, numbness and headaches.   Hematological: Negative.  Does not bruise/bleed easily.   Psychiatric/Behavioral: Negative.  Negative for confusion, hallucinations and sleep disturbance.    All other systems reviewed and are negative.

## 2024-04-05 DIAGNOSIS — E11.51 TYPE 2 DIABETES MELLITUS WITH DIABETIC PERIPHERAL ANGIOPATHY WITHOUT GANGRENE, WITHOUT LONG-TERM CURRENT USE OF INSULIN (HCC): ICD-10-CM

## 2024-04-05 RX ORDER — SITAGLIPTIN 50 MG/1
TABLET, FILM COATED ORAL
Qty: 30 TABLET | Refills: 5 | Status: SHIPPED | OUTPATIENT
Start: 2024-04-05

## 2024-04-28 DIAGNOSIS — I10 ESSENTIAL HYPERTENSION: ICD-10-CM

## 2024-04-28 RX ORDER — LISINOPRIL 40 MG/1
TABLET ORAL
Qty: 90 TABLET | Refills: 1 | Status: SHIPPED | OUTPATIENT
Start: 2024-04-28

## 2024-04-28 RX ORDER — AMLODIPINE BESYLATE 5 MG/1
TABLET ORAL
Qty: 90 TABLET | Refills: 1 | Status: SHIPPED | OUTPATIENT
Start: 2024-04-28

## 2024-06-10 ENCOUNTER — VBI (OUTPATIENT)
Dept: ADMINISTRATIVE | Facility: OTHER | Age: 49
End: 2024-06-10

## 2024-07-07 DIAGNOSIS — I10 ESSENTIAL HYPERTENSION: Chronic | ICD-10-CM

## 2024-07-07 RX ORDER — HYDROCHLOROTHIAZIDE 12.5 MG/1
TABLET ORAL
Qty: 90 TABLET | Refills: 0 | Status: SHIPPED | OUTPATIENT
Start: 2024-07-07

## 2024-08-09 ENCOUNTER — APPOINTMENT (OUTPATIENT)
Age: 49
End: 2024-08-09
Payer: COMMERCIAL

## 2024-08-09 ENCOUNTER — OFFICE VISIT (OUTPATIENT)
Age: 49
End: 2024-08-09
Payer: COMMERCIAL

## 2024-08-09 VITALS
WEIGHT: 218.8 LBS | TEMPERATURE: 97.5 F | OXYGEN SATURATION: 95 % | SYSTOLIC BLOOD PRESSURE: 126 MMHG | DIASTOLIC BLOOD PRESSURE: 78 MMHG | BODY MASS INDEX: 30.63 KG/M2 | RESPIRATION RATE: 18 BRPM | HEIGHT: 71 IN | HEART RATE: 73 BPM

## 2024-08-09 DIAGNOSIS — I10 PRIMARY HYPERTENSION: Chronic | ICD-10-CM

## 2024-08-09 DIAGNOSIS — R42 DIZZINESS, NONSPECIFIC: ICD-10-CM

## 2024-08-09 DIAGNOSIS — E55.9 VITAMIN D DEFICIENCY: ICD-10-CM

## 2024-08-09 DIAGNOSIS — R09.81 NASAL CONGESTION: ICD-10-CM

## 2024-08-09 DIAGNOSIS — E11.51 TYPE 2 DIABETES MELLITUS WITH DIABETIC PERIPHERAL ANGIOPATHY WITHOUT GANGRENE, WITHOUT LONG-TERM CURRENT USE OF INSULIN (HCC): ICD-10-CM

## 2024-08-09 DIAGNOSIS — E11.51 TYPE 2 DIABETES MELLITUS WITH DIABETIC PERIPHERAL ANGIOPATHY WITHOUT GANGRENE, WITHOUT LONG-TERM CURRENT USE OF INSULIN (HCC): Primary | ICD-10-CM

## 2024-08-09 LAB
25(OH)D3 SERPL-MCNC: 11.8 NG/ML (ref 30–100)
ANION GAP SERPL CALCULATED.3IONS-SCNC: 8 MMOL/L (ref 4–13)
BUN SERPL-MCNC: 20 MG/DL (ref 5–25)
CALCIUM SERPL-MCNC: 9.3 MG/DL (ref 8.4–10.2)
CHLORIDE SERPL-SCNC: 103 MMOL/L (ref 96–108)
CHOLEST SERPL-MCNC: 87 MG/DL
CO2 SERPL-SCNC: 29 MMOL/L (ref 21–32)
CREAT SERPL-MCNC: 1.39 MG/DL (ref 0.6–1.3)
ERYTHROCYTE [DISTWIDTH] IN BLOOD BY AUTOMATED COUNT: 12.7 % (ref 11.6–15.1)
EST. AVERAGE GLUCOSE BLD GHB EST-MCNC: 117 MG/DL
GFR SERPL CREATININE-BSD FRML MDRD: 60 ML/MIN/1.73SQ M
GLUCOSE P FAST SERPL-MCNC: 85 MG/DL (ref 65–99)
HBA1C MFR BLD: 5.7 %
HCT VFR BLD AUTO: 49.6 % (ref 36.5–49.3)
HDLC SERPL-MCNC: 43 MG/DL
HGB BLD-MCNC: 16 G/DL (ref 12–17)
LDLC SERPL CALC-MCNC: 33 MG/DL (ref 0–100)
MAGNESIUM SERPL-MCNC: 1.9 MG/DL (ref 1.9–2.7)
MCH RBC QN AUTO: 27.9 PG (ref 26.8–34.3)
MCHC RBC AUTO-ENTMCNC: 32.3 G/DL (ref 31.4–37.4)
MCV RBC AUTO: 87 FL (ref 82–98)
PLATELET # BLD AUTO: 105 THOUSANDS/UL (ref 149–390)
PMV BLD AUTO: 12.4 FL (ref 8.9–12.7)
POTASSIUM SERPL-SCNC: 3.6 MMOL/L (ref 3.5–5.3)
RBC # BLD AUTO: 5.73 MILLION/UL (ref 3.88–5.62)
SODIUM SERPL-SCNC: 140 MMOL/L (ref 135–147)
TRIGL SERPL-MCNC: 57 MG/DL
TSH SERPL DL<=0.05 MIU/L-ACNC: 2.08 UIU/ML (ref 0.45–4.5)
WBC # BLD AUTO: 9.69 THOUSAND/UL (ref 4.31–10.16)

## 2024-08-09 PROCEDURE — 36415 COLL VENOUS BLD VENIPUNCTURE: CPT

## 2024-08-09 PROCEDURE — 82306 VITAMIN D 25 HYDROXY: CPT

## 2024-08-09 PROCEDURE — 85027 COMPLETE CBC AUTOMATED: CPT

## 2024-08-09 PROCEDURE — 83735 ASSAY OF MAGNESIUM: CPT

## 2024-08-09 PROCEDURE — 83036 HEMOGLOBIN GLYCOSYLATED A1C: CPT

## 2024-08-09 PROCEDURE — 80048 BASIC METABOLIC PNL TOTAL CA: CPT

## 2024-08-09 PROCEDURE — 84443 ASSAY THYROID STIM HORMONE: CPT

## 2024-08-09 PROCEDURE — 80061 LIPID PANEL: CPT

## 2024-08-09 PROCEDURE — 99214 OFFICE O/P EST MOD 30 MIN: CPT | Performed by: INTERNAL MEDICINE

## 2024-08-09 RX ORDER — MECLIZINE HCL 12.5 MG/1
12.5 TABLET ORAL 3 TIMES DAILY PRN
Qty: 60 TABLET | Refills: 0 | Status: SHIPPED | OUTPATIENT
Start: 2024-08-09

## 2024-08-09 RX ORDER — AZELASTINE 1 MG/ML
1 SPRAY, METERED NASAL 2 TIMES DAILY
Qty: 30 ML | Refills: 3 | Status: SHIPPED | OUTPATIENT
Start: 2024-08-09

## 2024-08-09 NOTE — PROGRESS NOTES
Ambulatory Visit  Name: Dale Todd      : 1975      MRN: [id number]  Encounter Provider: Derick Gurrola DO  Encounter Date: 2024   Encounter department: [facility] [place]    Assessment & Plan   1. Type 2 diabetes mellitus with diabetic peripheral angiopathy without gangrene, without long-term current use of insulin (HCC)    Check updated A1c as well as additional lab work below. Sugars generally well controlled. Most recent A1c was 5.8 % on 2024. He denies any instances of hypoglycemia.    - Magnesium; Future  - CBC; Future  - Lipid Panel with Direct LDL reflex; Future  - Basic metabolic panel; Future  - Hemoglobin A1C; Future  - TSH, 3rd generation with Free T4 reflex; Future    2. Primary hypertension    Blood pressure stable in the office. Not having orthostatic symptoms. Will not make any changes to anti-HTN medications at this time.    3. Dizziness, nonspecific    Symptom is non-specific at this time. Discussed multiple different causes of dizziness and/or vertigo. Blood pressure is stable in the office today. No issues with low blood sugars. He noticed issues when the weather started to get really hot back in . He does have some nasal turbinate hypertrophy on exam. Otherwise, he has a normal neurological exam. Discussed use of meclizine prn to see if that helps. Work on increasing cardiovascular exercise and improving his conditioning. Will check lab work to see if there is anything going on that would explain dizziness.    - meclizine (ANTIVERT) 12.5 MG tablet; Take 1 tablet (12.5 mg total) by mouth 3 (three) times a day as needed for dizziness  Dispense: 60 tablet; Refill: 0    4. Nasal congestion  - azelastine (ASTELIN) 0.1 % nasal spray; 1 spray into each nostril 2 (two) times a day Use in each nostril as directed  Dispense: 30 mL; Refill: 3    5. Vitamin D deficiency  - Vitamin D 25 hydroxy; Future       History of Present Illness     History of Present  "Illness  The patient presents for evaluation of lightheadedness and dizziness.    He reports experiencing lightheadedness and mild dizziness. Recently, he has begun strength training at work, which involves extensive walking. He has observed that he tends to stagger after walking a block. His daily routine includes strength training. His blood sugar level this morning was 91. These symptoms have been ongoing since 06/2023, but were absent before 06/2023. He has been increasing his water intake due to his medications. Currently, he feels lightheaded while sitting. He denies any changes in his medication regimen. He is attempting to increase his seizure medication dosage with Dr. Masters. He has been on Keppra for several years without any side effects. He also reports nasal congestion on one side, but denies any ear fullness. He has used Flonase, an inhaler, and epinephrine, but found them to be ineffective. He also used saline. He has observed a spike in his blood pressure, with a reading of 150/109 in 07/2023. He also experiences a sensation of the room spinning.       Review of Systems - see HPI    Objective     /78 (BP Location: Left arm, Patient Position: Sitting, Cuff Size: Large)   Pulse 73   Temp 97.5 °F (36.4 °C) (Tympanic)   Resp 18   Ht 5' 11\" (1.803 m)   Wt 99.2 kg (218 lb 12.8 oz)   SpO2 95%   BMI 30.52 kg/m²     Physical Exam  Constitutional:       General: He is not in acute distress.     Appearance: He is not ill-appearing.   HENT:      Right Ear: Tympanic membrane, ear canal and external ear normal. There is no impacted cerumen.      Left Ear: Tympanic membrane, ear canal and external ear normal. There is no impacted cerumen.      Nose: Congestion (nasal turbinate hypertrophy) present.   Cardiovascular:      Rate and Rhythm: Normal rate and regular rhythm.      Heart sounds: No murmur heard.  Pulmonary:      Effort: Pulmonary effort is normal. No respiratory distress.      Breath sounds: No " wheezing.   Abdominal:      General: Bowel sounds are normal. There is no distension.      Tenderness: There is no abdominal tenderness.   Musculoskeletal:      Right lower leg: No edema.      Left lower leg: No edema.   Neurological:      General: No focal deficit present.      Mental Status: He is alert. Mental status is at baseline.      Cranial Nerves: No cranial nerve deficit.      Motor: No weakness.      Gait: Gait normal.       Derick Gurrola,

## 2024-08-12 DIAGNOSIS — E11.51 TYPE 2 DIABETES MELLITUS WITH DIABETIC PERIPHERAL ANGIOPATHY WITHOUT GANGRENE, WITHOUT LONG-TERM CURRENT USE OF INSULIN (HCC): ICD-10-CM

## 2024-08-12 NOTE — TELEPHONE ENCOUNTER
----- Message from Derick [facility], DO sent at 8/11/2024  9:04 AM EDT -----  Labs look good overall. Diabetes remains well controlled. His vitamin D was low at 11.8 (normal is ). Recommend 5,000 units of OTC vitamin D3 daily.

## 2024-08-19 ENCOUNTER — TELEMEDICINE (OUTPATIENT)
Dept: NEUROLOGY | Facility: CLINIC | Age: 49
End: 2024-08-19
Payer: COMMERCIAL

## 2024-08-19 DIAGNOSIS — G40.109 LOCALIZATION-RELATED EPILEPSY (HCC): ICD-10-CM

## 2024-08-19 PROCEDURE — 99214 OFFICE O/P EST MOD 30 MIN: CPT | Performed by: PSYCHIATRY & NEUROLOGY

## 2024-08-19 RX ORDER — LEVETIRACETAM 500 MG/1
TABLET ORAL
Qty: 120 TABLET | Refills: 11 | Status: SHIPPED | OUTPATIENT
Start: 2024-08-19

## 2024-08-19 RX ORDER — OXCARBAZEPINE 600 MG/1
TABLET, FILM COATED ORAL
Qty: 90 TABLET | Refills: 11 | Status: SHIPPED | OUTPATIENT
Start: 2024-08-19

## 2024-08-19 NOTE — PROGRESS NOTES
Virtual Regular Visit  Name: Dale Todd      : 1975      MRN: [id number]  Encounter Provider: Isidro Guzmán MD  Encounter Date: 2024   Encounter department: NEUROLOGY [facility]    Verification of patient location:    Patient is located at Home in the following state in which I hold an active license [place]    Assessment & Plan   1. Localization-related epilepsy (HCC)  Assessment & Plan:  He has not had any additional seizures since his last appointment.  As previously noted, it is unclear if his episodes represent epileptic seizures or if they are nonepileptic psychogenic spells, but his events have resolved, mainly since working with a therapist and being on his current medications.  He does appear to still have some difficulty with dizziness, but this mainly occurs when he is walking for an extended period of time.  He is unclear if this is related to his medications.    --He will continue levetiracetam unchanged.  I will have him decrease oxcarbazepine to be 900 mg twice a day.  This may help with his dizziness, since he is on the high dose of oxcarbazepine.  Orders:  -     OXcarbazepine (TRILEPTAL) 600 mg tablet; Take 1.5 tabs (900 mg) twice a day  -     levETIRAcetam (KEPPRA) 500 mg tablet; Take 2 tablets (1000 mg) by mouth twice per day        Encounter provider Isidro Guzmán MD    The patient was identified by name and date of birth. Dale Todd was informed that this is a telemedicine visit and that the visit is being conducted through the Epic Embedded platform. He agrees to proceed..  My office door was closed. No one else was in the room.  He acknowledged consent and understanding of privacy and security of the video platform. The patient has agreed to participate and understands they can discontinue the visit at any time.    Patient is aware this is a billable service.     History of Present Illness     Dale Todd is a 48 y.o. male who presents      Current seizure  medications:  1. Levetiracetam 1000 mg twice a day   2. Oxcarbazepine 900 mg in the morning and 1200 mg at night.   Other medications as per Epic.    Since his last visit, he has not had any additional seizures or events concerning for seizures.     He has been having some issues with feeling dizzy, mostly when walking a couple of blocks at a time. He describes this more as feeling lightheaded and perhaps having difficulty walking a straight line. He denies any vertigo.  This doesn't happen at other times.     He saw his PCP for the dizziness and was started on Meclizine. He hasn't noted much of an improvement with the meclizine    Prior Medications: Phenytoin, Lamotrigine, Divalproex, Zonisamide (weight loss/GI side effects)     I reviewed [facility] and summarized above.      Review of Systems   Constitutional:  Negative for appetite change, fatigue and fever.   HENT: Negative.  Negative for hearing loss, tinnitus, trouble swallowing and voice change.    Eyes: Negative.  Negative for photophobia, pain and visual disturbance.   Respiratory: Negative.  Negative for shortness of breath.    Cardiovascular: Negative.  Negative for palpitations.   Gastrointestinal: Negative.  Negative for nausea and vomiting.   Endocrine: Negative.  Negative for cold intolerance.   Genitourinary: Negative.  Negative for dysuria, frequency and urgency.   Musculoskeletal:  Negative for back pain, gait problem, myalgias, neck pain and neck stiffness.   Skin: Negative.  Negative for rash.   Allergic/Immunologic: Negative.    Neurological:  Positive for dizziness (long distance) and seizures (none). Negative for tremors, syncope, facial asymmetry, speech difficulty, weakness, light-headedness, numbness and headaches.   Hematological: Negative.  Does not bruise/bleed easily.   Psychiatric/Behavioral: Negative.  Negative for confusion, hallucinations and sleep disturbance.        Objective      There were no vitals taken for this visit.  Physical Exam    Visit Time  Total Visit Duration: 23 min

## 2024-08-19 NOTE — PATIENT INSTRUCTIONS
-- continue to take Levetiracetam 1000 mg twice a day     -- Please decrease your Oxcarbazepine to be 1.5 tabs (900 mg) twice a day.     -- Be sure to stay well hydrated.

## 2024-08-26 NOTE — ASSESSMENT & PLAN NOTE
He has not had any additional seizures since his last appointment.  As previously noted, it is unclear if his episodes represent epileptic seizures or if they are nonepileptic psychogenic spells, but his events have resolved, mainly since working with a therapist and being on his current medications.  He does appear to still have some difficulty with dizziness, but this mainly occurs when he is walking for an extended period of time.  He is unclear if this is related to his medications.    --He will continue levetiracetam unchanged.  I will have him decrease oxcarbazepine to be 900 mg twice a day.  This may help with his dizziness, since he is on the high dose of oxcarbazepine.

## 2024-10-03 DIAGNOSIS — I10 ESSENTIAL HYPERTENSION: Chronic | ICD-10-CM

## 2024-10-03 RX ORDER — HYDROCHLOROTHIAZIDE 12.5 MG/1
TABLET ORAL
Qty: 90 TABLET | Refills: 1 | Status: SHIPPED | OUTPATIENT
Start: 2024-10-03

## 2024-10-22 DIAGNOSIS — G40.109 LOCALIZATION-RELATED EPILEPSY (HCC): ICD-10-CM

## 2024-10-22 RX ORDER — OXCARBAZEPINE 600 MG/1
TABLET, FILM COATED ORAL
Qty: 270 TABLET | Refills: 4 | Status: SHIPPED | OUTPATIENT
Start: 2024-10-22

## 2024-10-31 DIAGNOSIS — I10 ESSENTIAL HYPERTENSION: ICD-10-CM

## 2024-10-31 RX ORDER — LISINOPRIL 40 MG/1
TABLET ORAL
Qty: 90 TABLET | Refills: 1 | Status: SHIPPED | OUTPATIENT
Start: 2024-10-31

## 2024-10-31 RX ORDER — AMLODIPINE BESYLATE 5 MG/1
TABLET ORAL
Qty: 90 TABLET | Refills: 1 | Status: SHIPPED | OUTPATIENT
Start: 2024-10-31

## 2024-11-04 NOTE — TELEPHONE ENCOUNTER
Attempted to contact patient  Called twice with busy signal  Will postpone and attempt tomorrow  Pt received from ED, vitals taken on admission heart rate 113 and blood pressure 152/110. pt stated to RN "I think I have a hernia in my testicles" pt tachycardic pt ordered for 500Cc bolus

## 2024-11-18 ENCOUNTER — TELEPHONE (OUTPATIENT)
Dept: NEUROLOGY | Facility: CLINIC | Age: 49
End: 2024-11-18

## 2024-12-06 ENCOUNTER — TELEPHONE (OUTPATIENT)
Dept: NEUROLOGY | Facility: CLINIC | Age: 49
End: 2024-12-06

## 2024-12-06 ENCOUNTER — TELEMEDICINE (OUTPATIENT)
Dept: NEUROLOGY | Facility: CLINIC | Age: 49
End: 2024-12-06
Payer: COMMERCIAL

## 2024-12-06 ENCOUNTER — TELEPHONE (OUTPATIENT)
Age: 49
End: 2024-12-06

## 2024-12-06 DIAGNOSIS — G40.109 LOCALIZATION-RELATED EPILEPSY (HCC): Primary | ICD-10-CM

## 2024-12-06 PROCEDURE — 99213 OFFICE O/P EST LOW 20 MIN: CPT | Performed by: NURSE PRACTITIONER

## 2024-12-06 NOTE — ASSESSMENT & PLAN NOTE
48 y.o. 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     Plan: Continue oxcarbazepine at current dose             Patient was encouraged to contact the office if he wishes to further wean his oxcarbazepine.            Notified office if you experience any further seizures            Follow-up:  6 months

## 2024-12-06 NOTE — TELEPHONE ENCOUNTER
Patient would like to know when/if he can discontinue oxcarbazepine 600mg daily.   (See PEP note below)    Per 12/6/24 (today) OV note:    Plan: Continue oxcarbazepine at current dose   Patient was encouraged to contact the office if he wishes to further wean his oxcarbazepine.        Shruthi - please advise. Thank you.

## 2024-12-06 NOTE — TELEPHONE ENCOUNTER
12/06/24    Patient f/u appt scheduled.    VIRTUAL per patient request, due to living and Hour in change away.    Accommodated patient to his Request.    VIRTUAL F/U Appt scheduled for 06/06/25, 8:00 AM With Dr. Felix at the [facility].      Any questions, please contact Patient.  Thank You.       NOTE:   Patient would like to know Dr. Felix Advice for the following:    Patient is requesting if is ok for him to be completely off from OXcarbazepine (TRILEPTAL) 600 mg medication.    He would like to know when he can stop taking the medication within the 6-month of waiting to see Dr. Eisenberg.     Please contact Patient with answer for his Request.

## 2024-12-06 NOTE — PROGRESS NOTES
Virtual Regular Visit  Name: Dale Todd      : 1975      MRN: [id number]  Encounter Provider: AR Snow  Encounter Date: 2024   Encounter department: [facility] [place]      Verification of patient location:  Patient is located at Home in the following state in which I hold an active license [place] :  Assessment & Plan  Localization-related epilepsy (HCC)  48 y.o. 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     Plan: Continue oxcarbazepine at current dose             Patient was encouraged to contact the office if he wishes to further wean his oxcarbazepine.            Notified office if you experience any further seizures            Follow-up:  6 months           Encounter provider AR Snow    The patient was identified by name and date of birth. Dale Todd was informed that this is a telemedicine visit and that the visit is being conducted through the Epic Embedded platform. He agrees to proceed..  My office door was closed. No one else was in the room.  He acknowledged consent and understanding of privacy and security of the video platform. The patient has agreed to participate and understands they can discontinue the visit at any time.    Patient is aware this is a billable service.     History of Present Illness   48 y.o. 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     At the last telemedicine office visit with Dr. Guzmán on 8/19/24, patient was to continue his levetiracetam dose unchanged, and decrease oxcarbazepine to 900 mg twice a day due to dizziness.  However, patient weaned himself off of levetiracetam, taking the last dose on November 15th.  He also decreased the dose of oxcarbazepine to 600 mg twice daily around the same time.  Patient reports no further seizures or seizure-like events, with the last event occurring on 3/2/2023.  Patient maintains that he is not missing any doses of his antiepileptic medications.  He is no longer seeing his therapist, last seeing them about 2 years ago.    He reports gaining weight since coming down on his medications.  He also reports getting a rash on the side of his face for which he has been taking daily Benadryl over the past few weeks.  Patient also reports becoming lightheaded when he is driving over about 20 minutes.    Current Antiepileptic Medications:   Oxcarbazepine 600mg bid     Prior Antiepileptic Medications: Phenytoin, Lamotrigine, Divalproex, Zonisamide (weight loss/GI side effects)     Prior Evaluation:     Video EEG 8/24/21- This prolonged, continuous video-EEG recording captured one spell of reported premonition feeling, which was without EEG changes. The lack of EEG change does not fully exclude the possibility of a focal unaware seizure. Intermittent left temporal delta activities suggest an underlying area of neuronal dysfunction.     - Video EEG- 6/30/21- This is an abnormal more than 9 hours continuous video EEG recording due to occasional left temporal polymorphic delta activity.  This finding may suggest left temporal focal cerebral dysfunction.     Patient wanted to terminate the study and leave the hospital.  -Routine EEG 3/4/21- This is an abnormal 32 minutes  awake and drowsy EEG due to occasional presence of low-moderate voltage 1-2 Hz delta activity.This finding may indicate focal cerebral dysfunction in the left temporal region; however, the relatively low amplitude and infrequency is not fully conclusive. If clinically indicated, a prolonged EEG study may improve diagnostic yield.    -Routine EEG 1/16/20- normal    -Routine sleep deprived EEG 12/28/18- normal     -Routine EEG 10/31/18- normal    - MRI brain with IAC wo 11/14/18- normal    Labs:- 8/9/24 Na 140, creat 1.39      Objective     Physical Exam  Alert and oriented x 3  Speech fluent  EOMs appear intact, face symmetrical    Visit Time  Total Visit Duration: 20 min

## 2024-12-06 NOTE — TELEPHONE ENCOUNTER
Patient was seen by Neurology today and they recommended he see Dr. Gurrola regarding changes patient has noticed since being started to be weaned off of his Kepra and Oxcarbazepine.  He is now completely off of the Kepra but Neurology does not want him to be fully off of the Oxcarbazepine until some issues are addressed.    Patient has been getting rashes and notices when he has to focus for long periods of time like while driving, he will get a blurry vision/lightheaded feeling.  He does not feel like it is motion sickness.  He noticed these changes once he started to be weaned off of these medications.    I offered an appointment today with Dr. Gurrola but patient will not make it in time due to having to drive to [place].  He is currently scheduled for 12/17/24 but would like to be seen sooner to discuss, if possible.  He is available every day next week except for Friday (he has to drive to [place] every Friday).  Please advise.  He would like a call back.

## 2024-12-06 NOTE — TELEPHONE ENCOUNTER
Left vm for patient to call back and told him to ask to speak to me Allison so I can help him find an appointment.

## 2024-12-09 ENCOUNTER — OFFICE VISIT (OUTPATIENT)
Age: 49
End: 2024-12-09
Payer: COMMERCIAL

## 2024-12-09 VITALS
BODY MASS INDEX: 31.5 KG/M2 | DIASTOLIC BLOOD PRESSURE: 76 MMHG | RESPIRATION RATE: 18 BRPM | HEART RATE: 84 BPM | TEMPERATURE: 97.3 F | WEIGHT: 225 LBS | SYSTOLIC BLOOD PRESSURE: 124 MMHG | OXYGEN SATURATION: 99 % | HEIGHT: 71 IN

## 2024-12-09 DIAGNOSIS — E55.9 VITAMIN D DEFICIENCY: ICD-10-CM

## 2024-12-09 DIAGNOSIS — E11.51 TYPE 2 DIABETES MELLITUS WITH DIABETIC PERIPHERAL ANGIOPATHY WITHOUT GANGRENE, WITHOUT LONG-TERM CURRENT USE OF INSULIN (HCC): ICD-10-CM

## 2024-12-09 DIAGNOSIS — L29.9 PRURITUS: Primary | ICD-10-CM

## 2024-12-09 LAB
LEFT EYE DIABETIC RETINOPATHY: NORMAL
LEFT EYE IMAGE QUALITY: NORMAL
LEFT EYE MACULAR EDEMA: NORMAL
LEFT EYE OTHER RETINOPATHY: NORMAL
RIGHT EYE DIABETIC RETINOPATHY: NORMAL
RIGHT EYE IMAGE QUALITY: NORMAL
RIGHT EYE MACULAR EDEMA: NORMAL
RIGHT EYE OTHER RETINOPATHY: NORMAL
SEVERITY (EYE EXAM): NORMAL
SL AMB POCT HEMOGLOBIN AIC: 5.8 (ref ?–6.5)

## 2024-12-09 PROCEDURE — 83036 HEMOGLOBIN GLYCOSYLATED A1C: CPT | Performed by: INTERNAL MEDICINE

## 2024-12-09 PROCEDURE — 92250 FUNDUS PHOTOGRAPHY W/I&R: CPT | Performed by: INTERNAL MEDICINE

## 2024-12-09 PROCEDURE — 99214 OFFICE O/P EST MOD 30 MIN: CPT | Performed by: INTERNAL MEDICINE

## 2024-12-09 RX ORDER — LEVOCETIRIZINE DIHYDROCHLORIDE 5 MG/1
5 TABLET, FILM COATED ORAL EVERY EVENING
Qty: 30 TABLET | Refills: 3 | Status: SHIPPED | OUTPATIENT
Start: 2024-12-09

## 2024-12-09 NOTE — PROGRESS NOTES
Name: Dale Todd      : 1975      MRN: [id number]  Encounter Provider: Derick Gurrola DO  Encounter Date: 2024   Encounter department: [facility]    Assessment & Plan  Pruritus    Unclear etiology of pruritus/hives but it does improve with benadryl. Recommend non-sedating anti-histamine such as xyzal. Avoid scratching/itching the skin. Follow-up with dermatology with his history of psoriasis.    Orders:    levocetirizine (XYZAL) 5 MG tablet; Take 1 tablet (5 mg total) by mouth every evening    Type 2 diabetes mellitus with diabetic peripheral angiopathy without gangrene, without long-term current use of insulin ([facility])    Lab Results   Component Value Date    HGBA1C 5.8 2024     Sugars remain very well controlled. Check diabetic eye exam. Repeat labs in 6 months.    Orders:    IRIS Diabetic eye exam    POCT hemoglobin A1c    CBC; Future    Basic metabolic panel; Future    Lipid Panel with Direct LDL reflex; Future    Hemoglobin A1C; Future    Albumin / creatinine urine ratio; Future    Vitamin D deficiency    Take vitamin D supplementation. Repeat labs in 6 months.    Orders:    Vitamin D 25 hydroxy; Future         History of Present Illness     History of Present Illness  The patient presents for evaluation of an allergic reaction.    He reports experiencing itchy skin predominantly on the upper half of his body. Initially, he attributed this to outdoor activities such as raking leaves, but the symptoms persisted even after discontinuing these activities. He confirms that there have been no changes in his laundry detergent or soap. He is not experiencing any other allergy symptoms such as sniffling or sneezing. He has not taken Benadryl recently.    He also mentions having psoriasis, which is causing him to scratch his back. He has a scheduled appointment with his dermatologist in the coming month. He has been applying a cream to his skin throughout the  "day.    Additionally, his blood sugar today was 140. He experiences lightheadedness while driving the car over a period of time.     Review of Systems - see HPI    Objective   /76 (BP Location: Left arm, Patient Position: Sitting, Cuff Size: Standard)   Pulse 84   Temp (!) 97.3 °F (36.3 °C) (Tympanic)   Resp 18   Ht 5' 11\" (1.803 m)   Wt 102 kg (225 lb)   SpO2 99%   BMI 31.38 kg/m²     Physical Exam  Constitutional:       General: He is not in acute distress.     Appearance: He is not ill-appearing.   Cardiovascular:      Rate and Rhythm: Normal rate and regular rhythm.      Heart sounds: No murmur heard.  Pulmonary:      Effort: Pulmonary effort is normal. No respiratory distress.      Breath sounds: No wheezing.   Abdominal:      General: Bowel sounds are normal. There is no distension.      Tenderness: There is no abdominal tenderness.   Musculoskeletal:      Right lower leg: No edema.      Left lower leg: No edema.   Skin:     Findings: Rash (excoriations on back and neck) present.   Neurological:      Mental Status: He is alert.         Derick Nothstein, DO   "

## 2024-12-09 NOTE — ASSESSMENT & PLAN NOTE
Lab Results   Component Value Date    HGBA1C 5.8 12/09/2024     Sugars remain very well controlled. Check diabetic eye exam. Repeat labs in 6 months.    Orders:    IRIS Diabetic eye exam    POCT hemoglobin A1c    CBC; Future    Basic metabolic panel; Future    Lipid Panel with Direct LDL reflex; Future    Hemoglobin A1C; Future    Albumin / creatinine urine ratio; Future

## 2024-12-10 NOTE — TELEPHONE ENCOUNTER
Outgoing call to patient regarding Shruthi's message below. Advised same. Patient voiced clear understanding. Warm transferred patient to  to schedule his EEG.   Nothing further needed.     ERINN Hawkins!

## 2024-12-10 NOTE — TELEPHONE ENCOUNTER
Please let patient know that his previous EEGs were abnormal and he will need a routine EEG before we would consider weaning him off of the oxcarbazepine. EEG ordered. He should continue taking the oxcarbazepine at the current dose of 600mg bid at this time.

## 2024-12-17 ENCOUNTER — HOSPITAL ENCOUNTER (OUTPATIENT)
Dept: NEUROLOGY | Facility: HOSPITAL | Age: 49
Discharge: HOME | End: 2024-12-17
Payer: COMMERCIAL

## 2024-12-17 ENCOUNTER — RESULTS FOLLOW-UP (OUTPATIENT)
Dept: NEUROLOGY | Facility: CLINIC | Age: 49
End: 2024-12-17

## 2024-12-17 DIAGNOSIS — G40.109 LOCALIZATION-RELATED EPILEPSY (HCC): ICD-10-CM

## 2024-12-17 PROCEDURE — 95816 EEG AWAKE AND DROWSY: CPT

## 2024-12-17 PROCEDURE — 95816 EEG AWAKE AND DROWSY: CPT | Performed by: STUDENT IN AN ORGANIZED HEALTH CARE EDUCATION/TRAINING PROGRAM

## 2025-01-02 DIAGNOSIS — L29.9 PRURITUS: ICD-10-CM

## 2025-01-02 RX ORDER — LEVOCETIRIZINE DIHYDROCHLORIDE 5 MG/1
5 TABLET, FILM COATED ORAL EVERY EVENING
Qty: 90 TABLET | Refills: 3 | Status: SHIPPED | OUTPATIENT
Start: 2025-01-02

## 2025-01-10 DIAGNOSIS — Z86.73 HISTORY OF CVA (CEREBROVASCULAR ACCIDENT): Chronic | ICD-10-CM

## 2025-01-10 RX ORDER — ATORVASTATIN CALCIUM 20 MG/1
20 TABLET, FILM COATED ORAL DAILY
Qty: 90 TABLET | Refills: 3 | Status: SHIPPED | OUTPATIENT
Start: 2025-01-10

## 2025-02-08 DIAGNOSIS — I10 ESSENTIAL HYPERTENSION: Chronic | ICD-10-CM

## 2025-02-10 RX ORDER — LISINOPRIL 40 MG/1
40 TABLET ORAL DAILY
Qty: 90 TABLET | Refills: 1 | Status: SHIPPED | OUTPATIENT
Start: 2025-02-10

## 2025-02-10 RX ORDER — HYDROCHLOROTHIAZIDE 12.5 MG/1
12.5 TABLET ORAL DAILY
Qty: 90 TABLET | Refills: 1 | Status: SHIPPED | OUTPATIENT
Start: 2025-02-10

## 2025-02-10 RX ORDER — AMLODIPINE BESYLATE 5 MG/1
5 TABLET ORAL DAILY
Qty: 90 TABLET | Refills: 1 | Status: SHIPPED | OUTPATIENT
Start: 2025-02-10

## 2025-02-18 ENCOUNTER — VBI (OUTPATIENT)
Dept: ADMINISTRATIVE | Facility: OTHER | Age: 50
End: 2025-02-18

## 2025-02-18 NOTE — TELEPHONE ENCOUNTER
02/18/25 3:14 PM     Chart reviewed for Hemoglobin A1c was/were submitted to the patient's insurance.     Kerri Chavarria MA   [facility]

## 2025-04-08 DIAGNOSIS — E11.51 TYPE 2 DIABETES MELLITUS WITH DIABETIC PERIPHERAL ANGIOPATHY WITHOUT GANGRENE, WITHOUT LONG-TERM CURRENT USE OF INSULIN (HCC): ICD-10-CM

## 2025-04-09 RX ORDER — SITAGLIPTIN 50 MG/1
50 TABLET, FILM COATED ORAL DAILY
Qty: 30 TABLET | Refills: 5 | Status: SHIPPED | OUTPATIENT
Start: 2025-04-09

## 2025-04-30 ENCOUNTER — TELEPHONE (OUTPATIENT)
Age: 50
End: 2025-04-30

## 2025-05-28 ENCOUNTER — TELEPHONE (OUTPATIENT)
Dept: NEUROLOGY | Facility: CLINIC | Age: 50
End: 2025-05-28

## 2025-06-02 ENCOUNTER — APPOINTMENT (OUTPATIENT)
Age: 50
End: 2025-06-02
Payer: COMMERCIAL

## 2025-06-02 ENCOUNTER — OFFICE VISIT (OUTPATIENT)
Age: 50
End: 2025-06-02
Payer: COMMERCIAL

## 2025-06-02 VITALS
RESPIRATION RATE: 18 BRPM | SYSTOLIC BLOOD PRESSURE: 128 MMHG | BODY MASS INDEX: 31.84 KG/M2 | TEMPERATURE: 97.8 F | HEIGHT: 71 IN | WEIGHT: 227.4 LBS | HEART RATE: 75 BPM | DIASTOLIC BLOOD PRESSURE: 86 MMHG | OXYGEN SATURATION: 97 %

## 2025-06-02 DIAGNOSIS — E55.9 VITAMIN D DEFICIENCY: ICD-10-CM

## 2025-06-02 DIAGNOSIS — G40.109 LOCALIZATION-RELATED EPILEPSY (HCC): ICD-10-CM

## 2025-06-02 DIAGNOSIS — Z00.00 ADULT GENERAL MEDICAL EXAMINATION: Primary | ICD-10-CM

## 2025-06-02 DIAGNOSIS — E11.51 TYPE 2 DIABETES MELLITUS WITH DIABETIC PERIPHERAL ANGIOPATHY WITHOUT GANGRENE, WITHOUT LONG-TERM CURRENT USE OF INSULIN (HCC): ICD-10-CM

## 2025-06-02 LAB — SL AMB POCT HEMOGLOBIN AIC: 5.8 (ref ?–6.5)

## 2025-06-02 PROCEDURE — 99396 PREV VISIT EST AGE 40-64: CPT | Performed by: INTERNAL MEDICINE

## 2025-06-02 PROCEDURE — 80048 BASIC METABOLIC PNL TOTAL CA: CPT

## 2025-06-02 PROCEDURE — 36415 COLL VENOUS BLD VENIPUNCTURE: CPT

## 2025-06-02 PROCEDURE — 83036 HEMOGLOBIN GLYCOSYLATED A1C: CPT | Performed by: INTERNAL MEDICINE

## 2025-06-02 PROCEDURE — 85027 COMPLETE CBC AUTOMATED: CPT

## 2025-06-02 PROCEDURE — 82043 UR ALBUMIN QUANTITATIVE: CPT

## 2025-06-02 PROCEDURE — 80061 LIPID PANEL: CPT

## 2025-06-02 PROCEDURE — 82306 VITAMIN D 25 HYDROXY: CPT

## 2025-06-02 PROCEDURE — 82570 ASSAY OF URINE CREATININE: CPT

## 2025-06-02 NOTE — ASSESSMENT & PLAN NOTE
Lab Results   Component Value Date    HGBA1C 5.8 06/02/2025     Sugars remain well controlled. Continue januvia.    Orders:  •  POCT hemoglobin A1c  •  Hemoglobin A1C; Future  •  Basic metabolic panel; Future  •  Lipid Panel with Direct LDL reflex; Future

## 2025-06-02 NOTE — PATIENT INSTRUCTIONS
"Patient Education     Routine physical for adults   The Basics   Written by the doctors and editors at [facility]   What is a physical? -- A physical is a routine visit, or \"check-up,\" with your doctor. You might also hear it called a \"wellness visit\" or \"preventive visit.\"  During each visit, the doctor will:   Ask about your physical and mental health   Ask about your habits, behaviors, and lifestyle   Do an exam   Give you vaccines if needed   Talk to you about any medicines you take   Give advice about your health   Answer your questions  Getting regular check-ups is an important part of taking care of your health. It can help your doctor find and treat any problems you have. But it's also important for preventing health problems.  A routine physical is different from a \"sick visit.\" A sick visit is when you see a doctor because of a health concern or problem. Since physicals are scheduled ahead of time, you can think about what you want to ask the doctor.  How often should I get a physical? -- It depends on your age and health. In general, for people age 21 years and older:   If you are younger than 50 years, you might be able to get a physical every 3 years.   If you are 50 years or older, your doctor might recommend a physical every year.  If you have an ongoing health condition, like diabetes or high blood pressure, your doctor will probably want to see you more often.  What happens during a physical? -- In general, each visit will include:   Physical exam - The doctor or nurse will check your height, weight, heart rate, and blood pressure. They will also look at your eyes and ears. They will ask about how you are feeling and whether you have any symptoms that bother you.   Medicines - It's a good idea to bring a list of all the medicines you take to each doctor visit. Your doctor will talk to you about your medicines and answer any questions. Tell them if you are having any side effects that bother you. You " "should also tell them if you are having trouble paying for any of your medicines.   Habits and behaviors - This includes:   Your diet   Your exercise habits   Whether you smoke, drink alcohol, or use drugs   Whether you are sexually active   Whether you feel safe at home  Your doctor will talk to you about things you can do to improve your health and lower your risk of health problems. They will also offer help and support. For example, if you want to quit smoking, they can give you advice and might prescribe medicines. If you want to improve your diet or get more physical activity, they can help you with this, too.   Lab tests, if needed - The tests you get will depend on your age and situation. For example, your doctor might want to check your:   Cholesterol   Blood sugar   Iron level   Vaccines - The recommended vaccines will depend on your age, health, and what vaccines you already had. Vaccines are very important because they can prevent certain serious or deadly infections.   Discussion of screening - \"Screening\" means checking for diseases or other health problems before they cause symptoms. Your doctor can recommend screening based on your age, risk, and preferences. This might include tests to check for:   Cancer, such as breast, prostate, cervical, ovarian, colorectal, prostate, lung, or skin cancer   Sexually transmitted infections, such as chlamydia and gonorrhea   Mental health conditions like depression and anxiety  Your doctor will talk to you about the different types of screening tests. They can help you decide which screenings to have. They can also explain what the results might mean.   Answering questions - The physical is a good time to ask the doctor or nurse questions about your health. If needed, they can refer you to other doctors or specialists, too.  Adults older than 65 years often need other care, too. As you get older, your doctor will talk to you about:   How to prevent falling at " home   Hearing or vision tests   Memory testing   How to take your medicines safely   Making sure that you have the help and support you need at home  All topics are updated as new evidence becomes available and our peer review process is complete.  This topic retrieved from [employer] on: May 02, 2024.  Topic 590706 Version 1.0  Release: 32.4.3 - C32.122  © 2024 UpToDate, Inc. and/or its affiliates. All rights reserved.  Consumer Information Use and Disclaimer   Disclaimer: This generalized information is a limited summary of diagnosis, treatment, and/or medication information. It is not meant to be comprehensive and should be used as a tool to help the user understand and/or assess potential diagnostic and treatment options. It does NOT include all information about conditions, treatments, medications, side effects, or risks that may apply to a specific patient. It is not intended to be medical advice or a substitute for the medical advice, diagnosis, or treatment of a health care provider based on the health care provider's examination and assessment of a patient's specific and unique circumstances. Patients must speak with a health care provider for complete information about their health, medical questions, and treatment options, including any risks or benefits regarding use of medications. This information does not endorse any treatments or medications as safe, effective, or approved for treating a specific patient. UpToDate, Inc. and its affiliates disclaim any warranty or liability relating to this information or the use thereof.The use of this information is governed by the Terms of Use, available at https://www.woltersRallywareuwer.com/en/know/clinical-effectiveness-terms. 2024© UpToDate, Inc. and its affiliates and/or licensors. All rights reserved.  Copyright   © 2024 UpToDate, Inc. and/or its affiliates. All rights reserved.

## 2025-06-02 NOTE — PROGRESS NOTES
Adult Annual Physical  Name: Dale Todd      : 1975      MRN: [id number]  Encounter Provider: Derick Gurrola DO  Encounter Date: 2025   Encounter department: [facility] [place]    :  Assessment & Plan  Adult general medical examination         Type 2 diabetes mellitus with diabetic peripheral angiopathy without gangrene, without long-term current use of insulin (HCC)    Lab Results   Component Value Date    HGBA1C 5.8 2025     Sugars remain well controlled. Continue januvia.    Orders:  •  POCT hemoglobin A1c  •  Hemoglobin A1C; Future  •  Basic metabolic panel; Future  •  Lipid Panel with Direct LDL reflex; Future    Localization-related epilepsy (HCC)    Stable and follows with neurology. Has upcoming appt on Friday.    Preventive Screenings:  - Diabetes Screening: screening not indicated and has diabetes  - Hepatitis C screening: screening up-to-date   - HIV screening: screening up-to-date   - Colon cancer screening: screening up-to-date   - Lung cancer screening: screening not indicated     Immunizations:  - Immunizations due: Prevnar 20 and Tdap    Counseling/Anticipatory Guidance:  - Alcohol: discussed moderation in alcohol intake and recommendations for healthy alcohol use.   - Drug use: discussed harms of illicit drug use and how it can negatively impact mental/physical health.   - Tobacco use: discussed harms of tobacco use and management options for quitting.   - Dental health: discussed importance of regular tooth brushing, flossing, and dental visits.   - Sexual health: discussed sexually transmitted diseases, partner selection, use of condoms, avoidance of unintended pregnancy, and contraceptive alternatives.   - Diet: discussed recommendations for a healthy/well-balanced diet.   - Exercise: the importance of regular exercise/physical activity was discussed. Recommend exercise 3-5 times per week for at least 30 minutes.   - Injury prevention: discussed  safety/seat belts, safety helmets, smoke detectors, carbon monoxide detectors, and smoking near bedding or upholstery.       Depression Screening and Follow-up Plan: Patient was screened for depression during today's encounter. They screened negative with a PHQ-2 score of 0.      History of Present Illness   {?Quick Links Encounters * My Last Note * Last Note in Specialty * Snapshot * Since Last Visit * History :94512}  Adult Annual Physical:  Patient presents for annual physical. Health has been stable overall. Denies any recent seizures.     Diet and Physical Activity:  - Diet/Nutrition: no special diet.  - Exercise: walking.    Depression Screening:  - PHQ-2 Score: 0    Advanced Care Planning:  - Has an advanced directive?: no    - Has a durable medical POA?: no      Review of Systems   Constitutional:  Negative for appetite change, chills, fatigue and fever.   HENT:  Negative for congestion, hearing loss, postnasal drip, rhinorrhea, sore throat, tinnitus and trouble swallowing.    Eyes:  Negative for pain, discharge, redness and visual disturbance.   Respiratory:  Negative for cough, chest tightness, shortness of breath and wheezing.    Cardiovascular:  Negative for chest pain, palpitations and leg swelling.   Gastrointestinal:  Negative for abdominal distention, abdominal pain, blood in stool, constipation, diarrhea, nausea and vomiting.   Endocrine: Negative for cold intolerance, heat intolerance, polydipsia, polyphagia and polyuria.   Genitourinary:  Negative for difficulty urinating, dysuria, frequency, hematuria and urgency.   Musculoskeletal:  Negative for arthralgias, back pain, gait problem, joint swelling, myalgias, neck pain and neck stiffness.   Skin:  Negative for color change and rash.   Neurological:  Negative for dizziness, tremors, seizures, syncope, speech difficulty, weakness, light-headedness, numbness and headaches.   Hematological:  Negative for adenopathy. Does not bruise/bleed easily.  "  Psychiatric/Behavioral:  Negative for agitation, behavioral problems, confusion, hallucinations, sleep disturbance and suicidal ideas. The patient is not nervous/anxious.      Medical History Reviewed by provider this encounter:  Tobacco  Allergies  Meds  Problems  Med Hx  Surg Hx  Fam Hx         Objective {?Quick Links Trend Vitals * Enter New Vitals * Results Review * Timeline (Adult) * Labs * Imaging * Cardiology * Procedures * Lung Cancer Screening * Surgical eConsent :98057}  /86 (BP Location: Left arm, Patient Position: Sitting, Cuff Size: Standard)   Pulse 75   Temp 97.8 °F (36.6 °C) (Tympanic)   Resp 18   Ht 5' 11\" (1.803 m)   Wt 103 kg (227 lb 6.4 oz)   SpO2 97%   BMI 31.72 kg/m²     Physical Exam  Constitutional:       General: He is not in acute distress.     Appearance: He is not ill-appearing.   HENT:      Right Ear: Tympanic membrane, ear canal and external ear normal. There is no impacted cerumen.      Left Ear: Tympanic membrane, ear canal and external ear normal. There is no impacted cerumen.      Mouth/Throat:      Mouth: Mucous membranes are moist.      Pharynx: No oropharyngeal exudate or posterior oropharyngeal erythema.     Cardiovascular:      Rate and Rhythm: Normal rate and regular rhythm.      Pulses: no weak pulses.           Dorsalis pedis pulses are 2+ on the right side and 2+ on the left side.        Posterior tibial pulses are 2+ on the right side and 2+ on the left side.      Heart sounds: No murmur heard.  Pulmonary:      Effort: Pulmonary effort is normal. No respiratory distress.      Breath sounds: No wheezing.   Abdominal:      General: Bowel sounds are normal. There is no distension.      Tenderness: There is no abdominal tenderness.     Musculoskeletal:      Right lower leg: No edema.      Left lower leg: No edema.   Feet:      Right foot:      Skin integrity: No ulcer, skin breakdown, erythema, warmth, callus or dry skin.      Left foot:      Skin " integrity: No ulcer, skin breakdown, erythema, warmth, callus or dry skin.     Neurological:      General: No focal deficit present.      Mental Status: He is alert. Mental status is at baseline.      Cranial Nerves: No cranial nerve deficit.     Psychiatric:         Mood and Affect: Mood normal.         Behavior: Behavior normal.       Diabetic Foot Exam    Patient's shoes and socks removed.    Right Foot/Ankle   Right Foot Inspection  Skin Exam: skin normal and skin intact. No dry skin, no warmth, no callus, no erythema, no maceration, no abnormal color, no pre-ulcer, no ulcer and no callus.     Toe Exam: ROM and strength within normal limits.     Sensory   Monofilament testing: intact    Vascular  The right DP pulse is 2+. The right PT pulse is 2+.     Left Foot/Ankle  Left Foot Inspection  Skin Exam: skin normal and skin intact. No dry skin, no warmth, no erythema, no maceration, normal color, no pre-ulcer, no ulcer and no callus.     Toe Exam: ROM and strength within normal limits.     Sensory   Monofilament testing: intact    Vascular  The left DP pulse is 2+. The left PT pulse is 2+.     Assign Risk Category  No deformity present  No loss of protective sensation  No weak pulses  Risk: 0    DO MAKAYLA Roberson_DELIMUCNEditor  [id number]

## 2025-06-03 ENCOUNTER — RESULTS FOLLOW-UP (OUTPATIENT)
Age: 50
End: 2025-06-03

## 2025-06-03 LAB
25(OH)D3 SERPL-MCNC: 12.2 NG/ML (ref 30–100)
ANION GAP SERPL CALCULATED.3IONS-SCNC: 9 MMOL/L (ref 4–13)
BUN SERPL-MCNC: 13 MG/DL (ref 5–25)
CALCIUM SERPL-MCNC: 8.9 MG/DL (ref 8.4–10.2)
CHLORIDE SERPL-SCNC: 105 MMOL/L (ref 96–108)
CHOLEST SERPL-MCNC: 77 MG/DL (ref ?–200)
CO2 SERPL-SCNC: 28 MMOL/L (ref 21–32)
CREAT SERPL-MCNC: 1.25 MG/DL (ref 0.6–1.3)
CREAT UR-MCNC: 192.2 MG/DL
ERYTHROCYTE [DISTWIDTH] IN BLOOD BY AUTOMATED COUNT: 13.1 % (ref 11.6–15.1)
GFR SERPL CREATININE-BSD FRML MDRD: 68 ML/MIN/1.73SQ M
GLUCOSE P FAST SERPL-MCNC: 97 MG/DL (ref 65–99)
HCT VFR BLD AUTO: 46.2 % (ref 36.5–49.3)
HDLC SERPL-MCNC: 41 MG/DL
HGB BLD-MCNC: 14.9 G/DL (ref 12–17)
LDLC SERPL CALC-MCNC: 26 MG/DL (ref 0–100)
MCH RBC QN AUTO: 28.4 PG (ref 26.8–34.3)
MCHC RBC AUTO-ENTMCNC: 32.3 G/DL (ref 31.4–37.4)
MCV RBC AUTO: 88 FL (ref 82–98)
MICROALBUMIN UR-MCNC: 17.2 MG/L
MICROALBUMIN/CREAT 24H UR: 9 MG/G CREATININE (ref 0–30)
PLATELET # BLD AUTO: 109 THOUSANDS/UL (ref 149–390)
PMV BLD AUTO: 13.4 FL (ref 8.9–12.7)
POTASSIUM SERPL-SCNC: 3.8 MMOL/L (ref 3.5–5.3)
RBC # BLD AUTO: 5.24 MILLION/UL (ref 3.88–5.62)
SODIUM SERPL-SCNC: 142 MMOL/L (ref 135–147)
TRIGL SERPL-MCNC: 48 MG/DL (ref ?–150)
WBC # BLD AUTO: 8.79 THOUSAND/UL (ref 4.31–10.16)

## 2025-06-03 NOTE — TELEPHONE ENCOUNTER
Relayed results to patient as per provider message. Patient expressed understanding and  is asking if the low vitamin d could be contributing to his lightheadedness he has experiencing for the past year.

## 2025-06-03 NOTE — TELEPHONE ENCOUNTER
----- Message from Derick Gurrola DO sent at 6/3/2025  7:06 AM EDT -----  Labs stable overall but vitamin D remains low. Recommend vitamin D3 OTC 5,000-10,000 units per day.  ----- Message -----  From: Lab, Background User  Sent: 6/3/2025  12:08 AM EDT  To: Derick Gurrola,

## 2025-06-03 NOTE — TELEPHONE ENCOUNTER
----- Message from Maria Elena De La Garza sent at 6/3/2025  9:32 AM EDT -----      ----- Message -----  From: Derick Gurrola DO  Sent: 6/3/2025   7:06 AM EDT  To: [facility]    Labs stable overall but vitamin D remains low. Recommend vitamin D3 OTC 5,000-10,000 units per day.  ----- Message -----  From: Lab, Background User  Sent: 6/3/2025  12:08 AM EDT  To: Derick Gurrola DO

## 2025-06-06 ENCOUNTER — TELEMEDICINE (OUTPATIENT)
Dept: NEUROLOGY | Facility: CLINIC | Age: 50
End: 2025-06-06
Payer: COMMERCIAL

## 2025-06-06 ENCOUNTER — TELEPHONE (OUTPATIENT)
Dept: NEUROLOGY | Facility: CLINIC | Age: 50
End: 2025-06-06

## 2025-06-06 DIAGNOSIS — G40.109 LOCALIZATION-RELATED EPILEPSY (HCC): Primary | ICD-10-CM

## 2025-06-06 PROCEDURE — 99214 OFFICE O/P EST MOD 30 MIN: CPT | Performed by: PSYCHIATRY & NEUROLOGY

## 2025-06-06 NOTE — PROGRESS NOTES
Name: Dale Todd      : 1975      MRN: [id number]  Encounter Provider: Isidro Guzmán MD  Encounter Date: 2025   [facility]  :  Assessment & Plan  Localization-related epilepsy (HCC)  He continues to be without any further events since his last appointment.  As previously noted, it is unclear if his events were epileptic seizures or nonepileptic psychogenic spells.  These have mainly occurred when he is under a large amount of stress, and now that he is not under additional stress, the events have resolved.  He has been without any events for at least 2 years, and would like to continue to wean fully off of medication.  To this end, I will have him stop taking oxcarbazepine.    --He will stop oxcarbazepine.  If he would have recurrence of his events, we could restart his medications.           He will Return in about 6 months (around 2025).      History of Present Illness   HPI  Dale Todd is a 49 y.o. male with prior stroke (although recent MRI negative for ischemic injury), mitral valve thrombotic mass removal in , and 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, who is returning for follow-up of his seizures.     Current seizure medications:  1. Oxcarbazepine 300 mg at night. (Has been self decreasing)  Other medications as per [facility].    Since his last visit, he continues to be without any other events. He has been gradually decreasing his medications. He is off of Levetiracetam and has decreased his Oxcarbazepine to 300 mg nightly.     He has been under a lot less stress at the time of his prior events. He is now under less stress, and has continued to be event free since 2023.     He did get a routine EEG as planned on 2024, which was normal.     I also reviewed his blood work from 2025. His vitamin D was low at 12.2, and he is now on a vitamin D supplement from his PCP. His platelets have been chronically low. His sodium  was normal.     Prior Seizure Medications: Phenytoin, Lamotrigine, Divalproex, Zonisamide (weight loss/GI side effects)       Review of Systems  I personally reviewed the review of systems that was entered by the medical assistant in a separate note       Objective   There were no vitals taken for this visit.     Physical Exam    Voice recognition software was used in the generation of this note. There may be unintentional errors including grammatical errors, spelling errors, or pronoun errors.  Administrative Statements   Encounter provider Isidro Guzmán MD    The Patient is located at Home and in the following state in which I hold an active license [place].    The patient was identified by name and date of birth. Dale Todd was informed that this is a telemedicine visit and that the visit is being conducted through the Epic Embedded platform. He agrees to proceed..  My office door was closed. No one else was in the room.  He acknowledged consent and understanding of privacy and security of the video platform. The patient has agreed to participate and understands they can discontinue the visit at any time.    I have spent a total time of 15 minutes in caring for this patient on the day of the visit/encounter including Instructions for management, Impressions, Documenting in the medical record, and Obtaining or reviewing history  , not including the time spent for establishing the audio/video connection.    :  I reviewed his EEG and blood work, as above.

## 2025-06-06 NOTE — ASSESSMENT & PLAN NOTE
He continues to be without any further events since his last appointment.  As previously noted, it is unclear if his events were epileptic seizures or nonepileptic psychogenic spells.  These have mainly occurred when he is under a large amount of stress, and now that he is not under additional stress, the events have resolved.  He has been without any events for at least 2 years, and would like to continue to wean fully off of medication.  To this end, I will have him stop taking oxcarbazepine.    --He will stop oxcarbazepine.  If he would have recurrence of his events, we could restart his medications.

## 2025-06-25 ENCOUNTER — VBI (OUTPATIENT)
Dept: ADMINISTRATIVE | Facility: OTHER | Age: 50
End: 2025-06-25

## 2025-06-25 NOTE — TELEPHONE ENCOUNTER
06/25/25 12:37 PM     Chart reviewed for [facility]: Colonoscopy was/were submitted to the patient's insurance.     Lory Melendez MA   [facility]

## 2025-06-26 ENCOUNTER — TELEPHONE (OUTPATIENT)
Age: 50
End: 2025-06-26

## 2025-07-08 DIAGNOSIS — I10 ESSENTIAL HYPERTENSION: Chronic | ICD-10-CM

## 2025-07-10 RX ORDER — HYDROCHLOROTHIAZIDE 12.5 MG/1
12.5 TABLET ORAL DAILY
Qty: 90 TABLET | Refills: 1 | Status: SHIPPED | OUTPATIENT
Start: 2025-07-10

## 2025-08-07 DIAGNOSIS — I10 ESSENTIAL HYPERTENSION: Chronic | ICD-10-CM

## 2025-08-07 RX ORDER — LISINOPRIL 40 MG/1
40 TABLET ORAL DAILY
Qty: 90 TABLET | Refills: 1 | Status: SHIPPED | OUTPATIENT
Start: 2025-08-07

## 2025-08-07 RX ORDER — AMLODIPINE BESYLATE 5 MG/1
5 TABLET ORAL DAILY
Qty: 90 TABLET | Refills: 1 | Status: SHIPPED | OUTPATIENT
Start: 2025-08-07